# Patient Record
Sex: FEMALE | Race: WHITE | NOT HISPANIC OR LATINO | Employment: OTHER | URBAN - METROPOLITAN AREA
[De-identification: names, ages, dates, MRNs, and addresses within clinical notes are randomized per-mention and may not be internally consistent; named-entity substitution may affect disease eponyms.]

---

## 2017-01-23 ENCOUNTER — PATIENT OUTREACH (OUTPATIENT)
Dept: ADMINISTRATIVE | Facility: HOSPITAL | Age: 62
End: 2017-01-23

## 2017-01-31 ENCOUNTER — LAB VISIT (OUTPATIENT)
Dept: LAB | Facility: HOSPITAL | Age: 62
End: 2017-01-31
Attending: FAMILY MEDICINE
Payer: MEDICARE

## 2017-01-31 DIAGNOSIS — I10 ESSENTIAL HYPERTENSION: ICD-10-CM

## 2017-01-31 DIAGNOSIS — E03.4 HYPOTHYROIDISM DUE TO ACQUIRED ATROPHY OF THYROID: ICD-10-CM

## 2017-01-31 DIAGNOSIS — Z00.00 WELLNESS EXAMINATION: ICD-10-CM

## 2017-01-31 DIAGNOSIS — L30.9 DERMATITIS: ICD-10-CM

## 2017-01-31 DIAGNOSIS — R73.03 PREDIABETES: ICD-10-CM

## 2017-01-31 DIAGNOSIS — E88.819 INSULIN RESISTANCE: ICD-10-CM

## 2017-01-31 DIAGNOSIS — E66.01 MORBID OBESITY, UNSPECIFIED OBESITY TYPE: ICD-10-CM

## 2017-01-31 LAB
ALBUMIN SERPL BCP-MCNC: 3.4 G/DL
ALP SERPL-CCNC: 98 U/L
ALT SERPL W/O P-5'-P-CCNC: 21 U/L
ANION GAP SERPL CALC-SCNC: 6 MMOL/L
AST SERPL-CCNC: 24 U/L
BASOPHILS # BLD AUTO: 0.04 K/UL
BASOPHILS NFR BLD: 0.5 %
BILIRUB SERPL-MCNC: 0.4 MG/DL
BUN SERPL-MCNC: 12 MG/DL
CALCIUM SERPL-MCNC: 9.1 MG/DL
CHLORIDE SERPL-SCNC: 101 MMOL/L
CHOLEST/HDLC SERPL: 3.9 {RATIO}
CO2 SERPL-SCNC: 33 MMOL/L
CREAT SERPL-MCNC: 0.9 MG/DL
DIFFERENTIAL METHOD: NORMAL
EOSINOPHIL # BLD AUTO: 0.5 K/UL
EOSINOPHIL NFR BLD: 6 %
ERYTHROCYTE [DISTWIDTH] IN BLOOD BY AUTOMATED COUNT: 14.4 %
EST. GFR  (AFRICAN AMERICAN): >60 ML/MIN/1.73 M^2
EST. GFR  (NON AFRICAN AMERICAN): >60 ML/MIN/1.73 M^2
GLUCOSE SERPL-MCNC: 149 MG/DL
HCT VFR BLD AUTO: 44.2 %
HDL/CHOLESTEROL RATIO: 25.8 %
HDLC SERPL-MCNC: 159 MG/DL
HDLC SERPL-MCNC: 41 MG/DL
HGB BLD-MCNC: 14.2 G/DL
INSULIN COLLECTION INTERVAL: NORMAL
INSULIN SERPL-ACNC: 10.5 UU/ML
LDLC SERPL CALC-MCNC: 92.8 MG/DL
LYMPHOCYTES # BLD AUTO: 2.2 K/UL
LYMPHOCYTES NFR BLD: 29.1 %
MCH RBC QN AUTO: 30.5 PG
MCHC RBC AUTO-ENTMCNC: 32.1 %
MCV RBC AUTO: 95 FL
MONOCYTES # BLD AUTO: 0.5 K/UL
MONOCYTES NFR BLD: 6.9 %
NEUTROPHILS # BLD AUTO: 4.3 K/UL
NEUTROPHILS NFR BLD: 57.4 %
NONHDLC SERPL-MCNC: 118 MG/DL
PLATELET # BLD AUTO: 168 K/UL
PMV BLD AUTO: 12.3 FL
POTASSIUM SERPL-SCNC: 4.1 MMOL/L
PROT SERPL-MCNC: 7.2 G/DL
RBC # BLD AUTO: 4.65 M/UL
SODIUM SERPL-SCNC: 140 MMOL/L
T4 FREE SERPL-MCNC: 1.38 NG/DL
TRIGL SERPL-MCNC: 126 MG/DL
TSH SERPL DL<=0.005 MIU/L-ACNC: 0.26 UIU/ML
WBC # BLD AUTO: 7.56 K/UL

## 2017-01-31 PROCEDURE — 80053 COMPREHEN METABOLIC PANEL: CPT

## 2017-01-31 PROCEDURE — 84443 ASSAY THYROID STIM HORMONE: CPT

## 2017-01-31 PROCEDURE — 80061 LIPID PANEL: CPT

## 2017-01-31 PROCEDURE — 83525 ASSAY OF INSULIN: CPT

## 2017-01-31 PROCEDURE — 83036 HEMOGLOBIN GLYCOSYLATED A1C: CPT

## 2017-01-31 PROCEDURE — 85025 COMPLETE CBC W/AUTO DIFF WBC: CPT

## 2017-01-31 PROCEDURE — 84439 ASSAY OF FREE THYROXINE: CPT

## 2017-01-31 PROCEDURE — 36415 COLL VENOUS BLD VENIPUNCTURE: CPT | Mod: PO

## 2017-02-01 LAB
ESTIMATED AVG GLUCOSE: 148 MG/DL
HBA1C MFR BLD HPLC: 6.8 %

## 2017-02-02 ENCOUNTER — OFFICE VISIT (OUTPATIENT)
Dept: INTERNAL MEDICINE | Facility: CLINIC | Age: 62
End: 2017-02-02
Payer: MEDICARE

## 2017-02-02 VITALS
SYSTOLIC BLOOD PRESSURE: 128 MMHG | BODY MASS INDEX: 42.16 KG/M2 | WEIGHT: 246.94 LBS | HEIGHT: 64 IN | TEMPERATURE: 98 F | DIASTOLIC BLOOD PRESSURE: 84 MMHG | HEART RATE: 74 BPM

## 2017-02-02 DIAGNOSIS — E66.01 MORBID OBESITY WITH BODY MASS INDEX OF 40.0-49.9: Chronic | ICD-10-CM

## 2017-02-02 DIAGNOSIS — I10 ESSENTIAL HYPERTENSION: Chronic | ICD-10-CM

## 2017-02-02 DIAGNOSIS — E78.5 DYSLIPIDEMIA: ICD-10-CM

## 2017-02-02 DIAGNOSIS — E03.4 HYPOTHYROIDISM DUE TO ACQUIRED ATROPHY OF THYROID: Chronic | ICD-10-CM

## 2017-02-02 DIAGNOSIS — E11.9 TYPE II OR UNSPECIFIED TYPE DIABETES MELLITUS WITHOUT MENTION OF COMPLICATION, NOT STATED AS UNCONTROLLED: ICD-10-CM

## 2017-02-02 DIAGNOSIS — F33.42 RECURRENT MAJOR DEPRESSION IN FULL REMISSION: Chronic | ICD-10-CM

## 2017-02-02 DIAGNOSIS — K21.9 GASTROESOPHAGEAL REFLUX DISEASE, ESOPHAGITIS PRESENCE NOT SPECIFIED: ICD-10-CM

## 2017-02-02 DIAGNOSIS — E78.2 MIXED DIABETIC HYPERLIPIDEMIA ASSOCIATED WITH TYPE 2 DIABETES MELLITUS: Chronic | ICD-10-CM

## 2017-02-02 DIAGNOSIS — Z00.00 ROUTINE GENERAL MEDICAL EXAMINATION AT A HEALTH CARE FACILITY: Primary | ICD-10-CM

## 2017-02-02 DIAGNOSIS — E11.69 MIXED DIABETIC HYPERLIPIDEMIA ASSOCIATED WITH TYPE 2 DIABETES MELLITUS: Chronic | ICD-10-CM

## 2017-02-02 PROCEDURE — 82570 ASSAY OF URINE CREATININE: CPT

## 2017-02-02 PROCEDURE — 3079F DIAST BP 80-89 MM HG: CPT | Mod: S$GLB,,, | Performed by: FAMILY MEDICINE

## 2017-02-02 PROCEDURE — 99999 PR PBB SHADOW E&M-EST. PATIENT-LVL III: CPT | Mod: PBBFAC,,, | Performed by: FAMILY MEDICINE

## 2017-02-02 PROCEDURE — 3074F SYST BP LT 130 MM HG: CPT | Mod: S$GLB,,, | Performed by: FAMILY MEDICINE

## 2017-02-02 PROCEDURE — 99396 PREV VISIT EST AGE 40-64: CPT | Mod: S$GLB,,, | Performed by: FAMILY MEDICINE

## 2017-02-02 PROCEDURE — 99499 UNLISTED E&M SERVICE: CPT | Mod: S$GLB,,, | Performed by: FAMILY MEDICINE

## 2017-02-02 RX ORDER — TRIAMTERENE AND HYDROCHLOROTHIAZIDE 75; 50 MG/1; MG/1
TABLET ORAL
Qty: 90 TABLET | Refills: 3 | Status: SHIPPED | OUTPATIENT
Start: 2017-02-02 | End: 2018-01-23 | Stop reason: SDUPTHER

## 2017-02-02 RX ORDER — LANCETS 28 GAUGE
1 EACH MISCELLANEOUS
Qty: 300 EACH | Refills: 3 | Status: SHIPPED | OUTPATIENT
Start: 2017-02-02 | End: 2018-01-23 | Stop reason: SDUPTHER

## 2017-02-02 RX ORDER — INSULIN PUMP SYRINGE, 3 ML
EACH MISCELLANEOUS
Qty: 1 EACH | Refills: 0 | Status: SHIPPED | OUTPATIENT
Start: 2017-02-02 | End: 2017-05-05 | Stop reason: SDUPTHER

## 2017-02-02 RX ORDER — METFORMIN HYDROCHLORIDE 500 MG/1
TABLET, EXTENDED RELEASE ORAL
Qty: 120 TABLET | Refills: 2 | Status: SHIPPED | OUTPATIENT
Start: 2017-02-02 | End: 2017-02-02 | Stop reason: SDUPTHER

## 2017-02-02 RX ORDER — METFORMIN HYDROCHLORIDE 500 MG/1
TABLET, EXTENDED RELEASE ORAL
Qty: 360 TABLET | Refills: 3 | Status: SHIPPED | OUTPATIENT
Start: 2017-02-02 | End: 2018-01-23 | Stop reason: SDUPTHER

## 2017-02-02 RX ORDER — LEVOTHYROXINE SODIUM 175 UG/1
TABLET ORAL
Qty: 90 TABLET | Refills: 3 | Status: SHIPPED | OUTPATIENT
Start: 2017-02-02 | End: 2017-07-19 | Stop reason: SDUPTHER

## 2017-02-02 RX ORDER — ESOMEPRAZOLE MAGNESIUM 40 MG/1
40 CAPSULE, DELAYED RELEASE ORAL
Qty: 90 CAPSULE | Refills: 3 | Status: SHIPPED | OUTPATIENT
Start: 2017-02-02 | End: 2019-01-15 | Stop reason: SDUPTHER

## 2017-02-02 RX ORDER — TRAZODONE HYDROCHLORIDE 50 MG/1
50 TABLET ORAL NIGHTLY
Qty: 90 TABLET | Refills: 3 | Status: SHIPPED | OUTPATIENT
Start: 2017-02-02 | End: 2018-04-16 | Stop reason: SDUPTHER

## 2017-02-02 RX ORDER — LOVASTATIN 40 MG/1
40 TABLET ORAL NIGHTLY
Qty: 90 TABLET | Refills: 3 | Status: SHIPPED | OUTPATIENT
Start: 2017-02-02 | End: 2018-01-23 | Stop reason: SDUPTHER

## 2017-02-02 NOTE — PROGRESS NOTES
Subjective:      Patient ID: Anupama Norman is a 61 y.o. female.    Chief Complaint: Annual Exam    HPI Comments: Patient is coming in today for prevention exam.  She's been doing quite well.    She now has diabetes.  She's willing to start metformin now.  A1c is at 6.8.  She's been trying to exercise more.  She does not want see a dietitian.    She has hypothyroidism which is currently controlled 175 µg.  Labs are reviewed.    Hyperlipidemia currently on a statin therapy.  Slightly elevated as well.    Depressions currently controlled followed by Dr. Cifuentes at the mental health clinic in [place].  She did  and  her .    Hypertension is controlled with a diuretic.    She reports that she declines the flu shot.  She's not willing to do a pneumonia shot today.          Lab Results   Component Value Date    WBC 7.56 01/31/2017    HGB 14.2 01/31/2017    HCT 44.2 01/31/2017     01/31/2017    CHOL 159 01/31/2017    TRIG 126 01/31/2017    HDL 41 01/31/2017    ALT 21 01/31/2017    AST 24 01/31/2017     01/31/2017    K 4.1 01/31/2017     01/31/2017    CREATININE 0.9 01/31/2017    BUN 12 01/31/2017    CO2 33 (H) 01/31/2017    TSH 0.262 (L) 01/31/2017    HGBA1C 6.8 (H) 01/31/2017       Review of Systems   Constitutional: Negative for chills, fatigue and fever.   HENT: Negative for ear pain and trouble swallowing.    Eyes: Negative for pain and visual disturbance.   Respiratory: Negative for cough and shortness of breath.    Cardiovascular: Negative for chest pain and leg swelling.   Gastrointestinal: Negative for abdominal pain, blood in stool, nausea and vomiting.   Endocrine: Negative for cold intolerance and heat intolerance.   Genitourinary: Negative for dysuria and frequency.   Musculoskeletal: Negative for joint swelling, myalgias and neck pain.   Skin: Negative for color change and rash.   Neurological: Negative for dizziness and headaches.   Psychiatric/Behavioral:  Negative for behavioral problems and sleep disturbance.     Objective:     Physical Exam   Constitutional: She is oriented to person, place, and time. She appears well-developed and well-nourished.   Morbid obese wf   HENT:   Head: Normocephalic and atraumatic.   Right Ear: Tympanic membrane and external ear normal.   Left Ear: Tympanic membrane and external ear normal.   Nose: Nose normal.   Mouth/Throat: Oropharynx is clear and moist. No oropharyngeal exudate or posterior oropharyngeal edema.   Eyes: EOM are normal. Pupils are equal, round, and reactive to light.   Neck: Normal range of motion. Neck supple. No thyromegaly present.   Cardiovascular: Normal rate and regular rhythm.    Pulses:       Dorsalis pedis pulses are 2+ on the right side, and 2+ on the left side.   Pulmonary/Chest: Effort normal and breath sounds normal.   Abdominal: Soft. Bowel sounds are normal.   Musculoskeletal: Normal range of motion. She exhibits no edema.        Right foot: There is normal range of motion and no deformity.        Left foot: There is normal range of motion and no deformity.   Feet:   Right Foot:   Protective Sensation: 4 sites tested. 4 sites sensed.   Skin Integrity: Positive for warmth. Negative for ulcer, blister, skin breakdown or erythema.   Left Foot:   Protective Sensation: 4 sites tested. 4 sites sensed.   Skin Integrity: Positive for warmth. Negative for ulcer, blister, skin breakdown or erythema.   Neurological: She is alert and oriented to person, place, and time. She displays no atrophy. No sensory deficit. She exhibits normal muscle tone.   Skin: Skin is warm and dry. No erythema.   Psychiatric: She has a normal mood and affect. Her speech is normal and behavior is normal. Judgment and thought content normal. Cognition and memory are normal.   Vitals reviewed.    Assessment:     1. Routine general medical examination at a health care facility    2. Mixed diabetic hyperlipidemia associated with type 2  diabetes mellitus    3. Type II or unspecified type diabetes mellitus without mention of complication, not stated as uncontrolled    4. Hypothyroidism due to acquired atrophy of thyroid    5. Gastroesophageal reflux disease, esophagitis presence not specified    6. Essential hypertension    7. Morbid obesity with body mass index of 40.0-49.9    8. Recurrent major depression in full remission    9. Dyslipidemia      Plan:   Anupama was seen today for annual exam.    Diagnoses and all orders for this visit:    Routine general medical examination at a health care facility- - labs reviewed. Discussed Health Maintenance issues.     -     Microalbumin/creatinine urine ratio  -     TSH; Future  -     T4, free; Future  -     Comprehensive metabolic panel; Future  -     Hemoglobin A1c; Future    Mixed diabetic hyperlipidemia associated with type 2 diabetes mellitus- new, cont with statin  -     Microalbumin/creatinine urine ratio  -     TSH; Future  -     T4, free; Future  -     Comprehensive metabolic panel; Future  -     Hemoglobin A1c; Future    Type II or unspecified type diabetes mellitus without mention of complication, not stated as uncontrolled- now with a1c at 6.8. Willing to do metformin now.   -     Microalbumin/creatinine urine ratio  -     TSH; Future  -     T4, free; Future  -     Comprehensive metabolic panel; Future  -     Hemoglobin A1c; Future    Hypothyroidism due to acquired atrophy of thyroid- - stable, Continue with current medications and interventions. Labs reviewed.     -     Microalbumin/creatinine urine ratio  -     TSH; Future  -     T4, free; Future  -     Comprehensive metabolic panel; Future  -     Hemoglobin A1c; Future    Gastroesophageal reflux disease, esophagitis presence not specified - stable, Continue with current medications and interventions. Labs reviewed.   -     Microalbumin/creatinine urine ratio  -     TSH; Future  -     T4, free; Future  -     Comprehensive metabolic panel;  Future  -     Hemoglobin A1c; Future    Essential hypertension - stable, Continue with current medications and interventions. Labs reviewed.   -     Microalbumin/creatinine urine ratio  -     TSH; Future  -     T4, free; Future  -     Comprehensive metabolic panel; Future  -     Hemoglobin A1c; Future    Morbid obesity with body mass index of 40.0-49.9- needing weight loss. Declines visit to dietician.   -     Microalbumin/creatinine urine ratio  -     TSH; Future  -     T4, free; Future  -     Comprehensive metabolic panel; Future  -     Hemoglobin A1c; Future    Recurrent major depression in full remission- stable, seeing Dr Cifuentes  -     Microalbumin/creatinine urine ratio  -     TSH; Future  -     T4, free; Future  -     Comprehensive metabolic panel; Future  -     Hemoglobin A1c; Future    Dyslipidemia - stable, Continue with current medications and interventions. Labs reviewed.   -     lovastatin (MEVACOR) 40 MG tablet; Take 1 tablet (40 mg total) by mouth nightly.  -     Microalbumin/creatinine urine ratio  -     TSH; Future  -     T4, free; Future  -     Comprehensive metabolic panel; Future  -     Hemoglobin A1c; Future    Other orders  -     Discontinue: metformin (GLUCOPHAGE-XR) 500 MG 24 hr tablet; 1 tab po daily x 1wk, 2 tab po daily x 1wk, 3 tab po daily x 1wk, 4 tab po daily  -     esomeprazole (NEXIUM) 40 MG capsule; Take 1 capsule (40 mg total) by mouth before breakfast.  -     levothyroxine (SYNTHROID, LEVOTHROID) 175 MCG tablet; TAKE 1 TABLET  BEFORE BREAKFAST.  -     metformin (GLUCOPHAGE-XR) 500 MG 24 hr tablet; 4 tab po daily  -     trazodone (DESYREL) 50 MG tablet; Take 1 tablet (50 mg total) by mouth every evening.  -     triamterene-hydrochlorothiazide 75-50 mg (MAXZIDE) 75-50 mg per tablet; TAKE 1 TABLET ONE TIME DAILY  -     blood sugar diagnostic (TRUETEST TEST STRIPS) Strp; 1 strip by Misc.(Non-Drug; Combo Route) route 4 (four) times daily with meals and nightly. [employer] True Metrix Test  Strips  -     lancets (TRUEPLUS LANCETS) 28 gauge Misc; 1 lancet by Misc.(Non-Drug; Combo Route) route 4 (four) times daily with meals and nightly. [employer] brand  -     blood-glucose meter (FREESTYLE SYSTEM KIT) kit; [employer] TRUE METRIX AIR METER Use as instructed for TWICE DAILY testing            Return in about 6 months (around 8/2/2017) for dm, chol, bp.

## 2017-02-03 LAB
CREAT UR-MCNC: 99 MG/DL
MICROALBUMIN UR DL<=1MG/L-MCNC: 5 UG/ML
MICROALBUMIN/CREATININE RATIO: 5.1 UG/MG

## 2017-02-13 ENCOUNTER — TELEPHONE (OUTPATIENT)
Dept: INTERNAL MEDICINE | Facility: CLINIC | Age: 62
End: 2017-02-13

## 2017-02-13 ENCOUNTER — CLINICAL SUPPORT (OUTPATIENT)
Dept: INTERNAL MEDICINE | Facility: CLINIC | Age: 62
End: 2017-02-13
Payer: MEDICARE

## 2017-02-13 ENCOUNTER — CLINICAL SUPPORT (OUTPATIENT)
Dept: CARDIOLOGY | Facility: CLINIC | Age: 62
End: 2017-02-13
Payer: MEDICARE

## 2017-02-13 DIAGNOSIS — Z01.818 PRE-OP EXAM: ICD-10-CM

## 2017-02-13 DIAGNOSIS — Z01.818 PREOP EXAM FOR INTERNAL MEDICINE: Primary | ICD-10-CM

## 2017-02-13 DIAGNOSIS — Z01.818 PRE-OP EXAM: Primary | ICD-10-CM

## 2017-02-13 LAB
B-HCG UR QL: NEGATIVE
CTP QC/QA: YES

## 2017-02-13 PROCEDURE — 93010 ELECTROCARDIOGRAM REPORT: CPT | Mod: S$GLB,,, | Performed by: NUCLEAR MEDICINE

## 2017-02-13 PROCEDURE — 81025 URINE PREGNANCY TEST: CPT | Mod: S$GLB,,, | Performed by: FAMILY MEDICINE

## 2017-02-13 PROCEDURE — 93005 ELECTROCARDIOGRAM TRACING: CPT | Mod: S$GLB,,, | Performed by: FAMILY MEDICINE

## 2017-02-13 NOTE — TELEPHONE ENCOUNTER
Called and spoke with Dr. Hollis's office. Explained to them that we just received paperwork this morning. That pt did tell us that she would possibly be having eye surgery but she would get back with us about when it would be scheduled and any paperwork that was needed. On paperwork they are requesting a EKG and blood work. Last EKG was done in 2014 that we have on file. Explained to Dahlia that we would have to get pt in to have EKG done and then Dr. Krueger needs to review and then can fill out paperwork. She stated that the patients surgery is scheduled for in the morning. I again explained to her that we just received paperwork this morning. She stated that she was calling to reschedule the patient as they need paperwork now.

## 2017-02-13 NOTE — TELEPHONE ENCOUNTER
----- Message from Shamar Jacob sent at 2/13/2017  1:06 PM CST -----  Contact: Dahlia from Dr Hollis's office  States she is calling rg a surgical clearance for in the morning and faxed to [phone] and can be reached at [phone]//[email or web address]

## 2017-02-13 NOTE — TELEPHONE ENCOUNTER
----- Message from Niurak Peters sent at 2/13/2017  1:56 PM CST -----  Contact: pt  Pt is scheduled for cataract surgery tomorrow. Pt needs an EKG.  Can pt be scheduled for one today? [phone]

## 2017-02-13 NOTE — TELEPHONE ENCOUNTER
Pt will need u preg and ekg for preop for catract surgery based on her eye MD's request. Will put in orders and complete form based on last visit.

## 2017-05-05 RX ORDER — BLOOD-GLUCOSE METER
EACH MISCELLANEOUS
Qty: 1 EACH | Refills: 0 | Status: SHIPPED | OUTPATIENT
Start: 2017-05-05 | End: 2017-07-11 | Stop reason: SDUPTHER

## 2017-05-18 ENCOUNTER — PATIENT MESSAGE (OUTPATIENT)
Dept: INTERNAL MEDICINE | Facility: CLINIC | Age: 62
End: 2017-05-18

## 2017-05-19 ENCOUNTER — OFFICE VISIT (OUTPATIENT)
Dept: URGENT CARE | Facility: CLINIC | Age: 62
End: 2017-05-19
Payer: MEDICARE

## 2017-05-19 VITALS
BODY MASS INDEX: 40.31 KG/M2 | OXYGEN SATURATION: 96 % | HEIGHT: 64 IN | DIASTOLIC BLOOD PRESSURE: 78 MMHG | WEIGHT: 236.13 LBS | HEART RATE: 83 BPM | SYSTOLIC BLOOD PRESSURE: 136 MMHG | TEMPERATURE: 98 F

## 2017-05-19 DIAGNOSIS — N39.0 URINARY TRACT INFECTION WITHOUT HEMATURIA, SITE UNSPECIFIED: ICD-10-CM

## 2017-05-19 DIAGNOSIS — R30.0 DYSURIA: Primary | ICD-10-CM

## 2017-05-19 PROCEDURE — 3075F SYST BP GE 130 - 139MM HG: CPT | Mod: S$GLB,,, | Performed by: NURSE PRACTITIONER

## 2017-05-19 PROCEDURE — 99999 PR PBB SHADOW E&M-EST. PATIENT-LVL IV: CPT | Mod: PBBFAC,,, | Performed by: NURSE PRACTITIONER

## 2017-05-19 PROCEDURE — 87086 URINE CULTURE/COLONY COUNT: CPT

## 2017-05-19 PROCEDURE — 1160F RVW MEDS BY RX/DR IN RCRD: CPT | Mod: S$GLB,,, | Performed by: NURSE PRACTITIONER

## 2017-05-19 PROCEDURE — 99213 OFFICE O/P EST LOW 20 MIN: CPT | Mod: S$GLB,,, | Performed by: NURSE PRACTITIONER

## 2017-05-19 PROCEDURE — 3078F DIAST BP <80 MM HG: CPT | Mod: S$GLB,,, | Performed by: NURSE PRACTITIONER

## 2017-05-19 RX ORDER — CIPROFLOXACIN 250 MG/1
250 TABLET, FILM COATED ORAL 2 TIMES DAILY
Qty: 6 TABLET | Refills: 0 | Status: SHIPPED | OUTPATIENT
Start: 2017-05-19 | End: 2017-05-22

## 2017-05-19 NOTE — MR AVS SNAPSHOT
[facility]  [address] [place] [address]  Phone: [phone]  Fax: [phone]                  Anupama Norman   2017 1:00 PM   Office Visit    Description:  Female : 1955   Provider:  FUNMILAYO Gomez   Department:  [facility]           Reason for Visit     Dysuria     Headache           Diagnoses this Visit        Comments    Dysuria    -  Primary     Urinary tract infection without hematuria, site unspecified                To Do List           Future Appointments        Provider Department Dept Phone    2017 8:00 AM LABORATORY, [place] [facility] [phone]    8/3/2017 1:00 PM [phone]      Goals (5 Years of Data)     None       These Medications        Disp Refills Start End    ciprofloxacin HCl (CIPRO) 250 MG tablet 6 tablet 0 2017    Take 1 tablet (250 mg total) by mouth 2 (two) times daily. - Oral    Pharmacy: [facility] [place] [address]  [address] Ph #: [phone]         [facility]     [facility] -  Assistance  Unless otherwise directed by your provider, please contact [facility], our nurse care line that is available for  assistance.     Registered nurses in the [facility] provide: appointment scheduling, clinical advisement, health education, and other advisory services.  Call: [phone] (toll free)               Medications           Message regarding Medications     Verify the changes and/or additions to your medication regime listed below are the same as discussed with your clinician today.  If any of these changes or additions are incorrect, please notify your healthcare provider.        START taking these NEW medications        Refills    ciprofloxacin HCl (CIPRO) 250 MG tablet 0    Sig: Take 1 tablet (250 mg total) by mouth 2 (two) times  "daily.    Class: Normal    Route: Oral           Verify that the below list of medications is an accurate representation of the medications you are currently taking.  If none reported, the list may be blank. If incorrect, please contact your healthcare provider. Carry this list with you in case of emergency.           Current Medications     aspirin (ECOTRIN) 81 MG EC tablet Take 1 tablet by mouth Daily.    blood sugar diagnostic (TRUETEST TEST STRIPS) Strp 1 strip by Misc.(Non-Drug; Combo Route) route 4 (four) times daily with meals and nightly. [employer] True Metrix Test Strips    esomeprazole (NEXIUM) 40 MG capsule Take 1 capsule (40 mg total) by mouth before breakfast.    lancets (TRUEPLUS LANCETS) 28 gauge Misc 1 lancet by Misc.(Non-Drug; Combo Route) route 4 (four) times daily with meals and nightly. [employer] brand    levothyroxine (SYNTHROID, LEVOTHROID) 175 MCG tablet TAKE 1 TABLET  BEFORE BREAKFAST.    lovastatin (MEVACOR) 40 MG tablet Take 1 tablet (40 mg total) by mouth nightly.    metformin (GLUCOPHAGE-XR) 500 MG 24 hr tablet 4 tab po daily    paroxetine (PAXIL) 20 MG tablet Take 20 mg by mouth every morning.    paroxetine (PAXIL) 40 MG tablet Take 40 mg by mouth every evening.     trazodone (DESYREL) 50 MG tablet Take 1 tablet (50 mg total) by mouth every evening.    triamterene-hydrochlorothiazide 75-50 mg (MAXZIDE) 75-50 mg per tablet TAKE 1 TABLET ONE TIME DAILY    TRUE METRIX AIR GLUCOSE METER kit USE AS DIRECTED    ciprofloxacin HCl (CIPRO) 250 MG tablet Take 1 tablet (250 mg total) by mouth 2 (two) times daily.    triamcinolone acetonide 0.1% (KENALOG) 0.1 % cream Apply topically 2 (two) times daily.           Clinical Reference Information           Your Vitals Were     BP Pulse Temp Height    136/78 (BP Location: Right arm, Patient Position: Sitting, BP Method: Manual) 83 98 °F (36.7 °C) (Tympanic) 5' 4" (1.626 m)    Weight SpO2 BMI    107.1 kg (236 lb 1.8 oz) 96% 40.53 kg/m2      Blood Pressure      " "    Most Recent Value    BP  136/78      Allergies as of 5/19/2017     Betamethasone Sodium Phosphate    Codeine    Loratadine    Prednisone    Tomato (Solanum Lycopersicum)      Immunizations Administered on Date of Encounter - 5/19/2017     None      Orders Placed During Today's Visit      Normal Orders This Visit    POCT urine dipstick without microscope       Instructions      Bladder Infection, Female (Adult)    Urine is normally doesn't have any bacteria in it. But bacteria can get into the urinary tract from the skin around the rectum. Or they can travel in the blood from elsewhere in the body. Once they are in your urinary tract, they can cause infection in the urethra (urethritis), the bladder (cystitis), or the kidneys (pyelonephritis).  The most common place for an infection is in the bladder. This is called a bladder infection. This is one of the most common infections in women. Most bladder infections are easily treated. They are not serious unless the infection spreads to the kidney.  The phrases "bladder infection," "UTI," and "cystitis" are often used to describe the same thing. But they are not always the same. Cystitis is an inflammation of the bladder. The most common cause of cystitis is an infection.  Symptoms  The infection causes inflammation in the urethra and bladder. This causes many of the symptoms. The most common symptoms of a bladder infection are:  · Pain or burning when urinating  · Having to urinate more often than usual  · Urgent need to urinate  · Only a small amount of urine comes out  · Blood in urine  · Abdominal discomfort. This is usually in the lower abdomen above the pubic bone.  · Cloudy urine  · Strong- or bad-smelling urine  · Unable to urinate (urinary retention)  · Unable to hold urine in (urinary incontinence)  · Fever  · Loss of appetite  · Confusion (in older adults)  Causes  Bladder infections are not contagious. You can't get one from someone else, from a toilet " seat, or from sharing a bath.  The most common cause of bladder infections is bacteria from the bowels. The bacteria get onto the skin around the opening of the urethra. From there, they can get into the urine and travel up to the bladder, causing inflammation and infection. This usually happens because of:  · Wiping improperly after urinating. Always wipe from front to back.  · Bowel incontinence  · Pregnancy  · Procedures such as having a catheter inserted  · Older age  · Not emptying your bladder. This can allow bacteria a chance to grow in your urine.  · Dehydration  · Constipation  · Sex  · Use of a diaphragm for birth control   Treatment  Bladder infections are diagnosed by a urine test. They are treated with antibiotics and usually clear up quickly without complications. Treatment helps prevent a more serious kidney infection.  Medicines  Medicines can help in the treatment of a bladder infection:  · Take antibiotics until they are used up, even if you feel better. It is important to finish them to make sure the infection has cleared.  · You can use acetaminophen or ibuprofen for pain, fever, or discomfort, unless another medicine was prescribed. If you have chronic liver or kidney disease, talk with your healthcare provider before using these medicines. Also talk with your provider if you've ever had a stomach ulcer or gastrointestinal bleeding, or are taking blood-thinner medicines.  · If you are given phenazopydridine to reduce burning with urination, it will cause your urine to become a bright orange color. This can stain clothing.  Care and prevention  These self-care steps can help prevent future infections:  · Drink plenty of fluids to prevent dehydration and flush out your bladder. Do this unless you must restrict fluids for other health reasons, or your doctor told you not to.  · Proper cleaning after going to the bathroom is important. Wipe from front to back after using the toilet to prevent the  spread of bacteria.  · Urinate more often. Don't try to hold urine in for a long time.  · Wear loose-fitting clothes and cotton underwear. Avoid tight-fitting pants.  · Improve your diet and prevent constipation. Eat more fresh fruit and vegetables, and fiber, and less junk and fatty foods.  · Avoid sex until your symptoms are gone.  · Avoid caffeine, alcohol, and spicy foods. These can irritate your bladder.  · Urinate right after intercourse to flush out your bladder.  · If you use birth control pills and have frequent bladder infections, discuss it with your doctor.  Follow-up care  Call your healthcare provider if all symptoms are not gone after 3 days of treatment. This is especially important if you have repeat infections.  If a culture was done, you will be told if your treatment needs to be changed. If directed, you can call to find out the results.  If X-rays were done, you will be told if the results will affect your treatment.  Call 911  Call 911 if any of the following occur:  · Trouble breathing  · Hard to wake up or confusion  · Fainting or loss of consciousness  · Rapid heart rate  When to seek medical advice  Call your healthcare provider right away if any of these occur:  · Fever of 100.4ºF (38.0ºC) or higher, or as directed by your healthcare provider  · Symptoms are not better by the third day of treatment  · Back or belly (abdominal) pain that gets worse  · Repeated vomiting, or unable to keep medicine down  · Weakness or dizziness  · Vaginal discharge  · Pain, redness, or swelling in the outer vaginal area (labia)  Date Last Reviewed: 10/1/2016  © 0045-5038 [employer]. [address] [place] [address], [place] [address]. All rights reserved. This information is not intended as a substitute for professional medical care. Always follow your healthcare professional's instructions.             Language Assistance Services     ATTENTION: Language assistance services are available, free of  charge. Please call [phone].      ATENCIÓN: Si habla español, tiene a waller disposición servicios gratuitos de asistencia lingüística. Llame al [phone].     CHÚ Ý: N?u b?n nói Ti?ng Vi?t, có các d?ch v? h? tr? ngôn ng? mi?n phí dành cho b?n. G?i s? [phone].         [facility] complies with applicable Federal civil rights laws and does not discriminate on the basis of race, color, national origin, age, disability, or sex.

## 2017-05-19 NOTE — PROGRESS NOTES
Subjective:       Patient ID: Anupama Dallasmonsam is a 62 y.o. female.    Chief Complaint: Dysuria and Headache    HPI   Anupama presents to urgent care with complaints of dysuria x 2 days.  Review of Systems   Constitutional: Negative for chills and fever.   Respiratory: Negative for shortness of breath.    Gastrointestinal: Negative for nausea and vomiting.   Genitourinary: Positive for dysuria, frequency and urgency. Negative for decreased urine volume, difficulty urinating and hematuria.   Neurological: Negative for headaches.   Psychiatric/Behavioral: Negative for behavioral problems and confusion.       Objective:      Physical Exam   Constitutional: She is oriented to person, place, and time. She appears well-developed and well-nourished.   Eyes: Conjunctivae are normal.   Neck: Normal range of motion.   Cardiovascular: Normal rate.    Pulmonary/Chest: Effort normal. No respiratory distress.   Abdominal: Soft. Bowel sounds are normal. She exhibits no distension. There is no tenderness.   Neurological: She is alert and oriented to person, place, and time.   Skin: Skin is warm and dry.   Psychiatric: She has a normal mood and affect. Her behavior is normal.   Vitals reviewed.      Assessment:       1. Dysuria    2. Urinary tract infection without hematuria, site unspecified        Plan:   Anupama was seen today for dysuria and headache.    Diagnoses and all orders for this visit:    Dysuria  -     POCT urine dipstick without microscope  -     ciprofloxacin HCl (CIPRO) 250 MG tablet; Take 1 tablet (250 mg total) by mouth 2 (two) times daily.  -     Urine culture    Urinary tract infection without hematuria, site unspecified  -     ciprofloxacin HCl (CIPRO) 250 MG tablet; Take 1 tablet (250 mg total) by mouth 2 (two) times daily.  -     Urine culture      - Increase fluid intake.  Drink at least 64 ounces of water each day.  - Do not hold urine in the bladder.  Empty bladder with first urge to urinate.  - Avoid  wiping from back to front.  - Empty bladder before and after intercourse  -- Complete all antibiotics to help prevent recurrence of infection and resistant bacteria.  -- Follow up with PCP if no improvement in 3-5 days.   - To ER for worsening symptoms or persistent fever

## 2017-05-19 NOTE — PATIENT INSTRUCTIONS

## 2017-05-21 LAB — BACTERIA UR CULT: NORMAL

## 2017-06-19 ENCOUNTER — OFFICE VISIT (OUTPATIENT)
Dept: OBSTETRICS AND GYNECOLOGY | Facility: CLINIC | Age: 62
End: 2017-06-19
Payer: MEDICARE

## 2017-06-19 VITALS
WEIGHT: 240.94 LBS | SYSTOLIC BLOOD PRESSURE: 148 MMHG | DIASTOLIC BLOOD PRESSURE: 98 MMHG | HEIGHT: 64 IN | BODY MASS INDEX: 41.13 KG/M2

## 2017-06-19 DIAGNOSIS — E78.2 MIXED DIABETIC HYPERLIPIDEMIA ASSOCIATED WITH TYPE 2 DIABETES MELLITUS: Chronic | ICD-10-CM

## 2017-06-19 DIAGNOSIS — E11.69 MIXED DIABETIC HYPERLIPIDEMIA ASSOCIATED WITH TYPE 2 DIABETES MELLITUS: Chronic | ICD-10-CM

## 2017-06-19 DIAGNOSIS — Z01.818 PREOP TESTING: ICD-10-CM

## 2017-06-19 DIAGNOSIS — N81.6 RECTOCELE: ICD-10-CM

## 2017-06-19 DIAGNOSIS — I10 ESSENTIAL HYPERTENSION: Chronic | ICD-10-CM

## 2017-06-19 DIAGNOSIS — N81.9 FEMALE GENITAL PROLAPSE, UNSPECIFIED TYPE: ICD-10-CM

## 2017-06-19 DIAGNOSIS — R14.0 ABDOMINAL BLOATING: Primary | ICD-10-CM

## 2017-06-19 DIAGNOSIS — E66.01 MORBID OBESITY WITH BODY MASS INDEX OF 40.0-49.9: Chronic | ICD-10-CM

## 2017-06-19 PROCEDURE — 3044F HG A1C LEVEL LT 7.0%: CPT | Mod: S$GLB,,, | Performed by: OBSTETRICS & GYNECOLOGY

## 2017-06-19 PROCEDURE — 99999 PR PBB SHADOW E&M-EST. PATIENT-LVL IV: CPT | Mod: PBBFAC,,, | Performed by: OBSTETRICS & GYNECOLOGY

## 2017-06-19 PROCEDURE — 99213 OFFICE O/P EST LOW 20 MIN: CPT | Mod: S$GLB,,, | Performed by: OBSTETRICS & GYNECOLOGY

## 2017-06-19 NOTE — PROGRESS NOTES
"+ large rectocele in midline just cephalad to the introitus about 1-2cm      CHIEF COMPLAINT:   No chief complaint on file.      HISTORY OF PRESENT ILLNESS    Anupama Norman 62 y.o.  complains of: feeling something 'give' in the vaginal area, and now feels like there is tisue sticking out, franci when she sits.   She always felt that after her last  the doc did not 'sew me up correctly', but did not have this falling feeling at the time.   She has very rare ur incontinence, no stool or flatus incontinence.   Not sexually active. occ consti[pation but o/w no BM problems. Does not have to splint. No VB.    HISTORY  Patient Active Problem List   Diagnosis    Recurrent major depression in full remission    Essential hypertension    GERD (gastroesophageal reflux disease)    Mixed diabetic hyperlipidemia associated with type 2 diabetes mellitus    Hypothyroidism due to acquired atrophy of thyroid    Morbid obesity with body mass index of 40.0-49.9    Colon polyp       Past Medical History:   Diagnosis Date    GERD (gastroesophageal reflux disease)     HLD (hyperlipidemia)     HTN (hypertension)     Hypothyroid     Insulin resistance     Obesity     Staph infection        Past Surgical History:   Procedure Laterality Date    APPENDECTOMY  1982    BARTHOLIN GLAND CYST EXCISION Left 2010    Dr ABDOULAYE Fang    BREAST SURGERY Right     benign lump removed    CATARACT EXTRACTION      CHOLECYSTECTOMY  2014    lap choly for biliary dyskinesia    ORIF WRIST FRACTURE  2009    comminuted intraarticular fx of distal Lt radius w/assoc avulsion fx styloid process of ulna - "metalplate       Family History   Problem Relation Age of Onset    Diabetes         Social History     Social History    Marital status: Single     Spouse name: N/A    Number of children: 2    Years of education: N/A     Occupational History    retired      Social History Main Topics    Smoking status: Never " Smoker    Smokeless tobacco: Never Used    Alcohol use Yes      Comment: rarely // once a year    Drug use: No    Sexual activity: Not Currently     Partners: Male     Birth control/ protection: None     Other Topics Concern    None     Social History Narrative    None       Current Outpatient Prescriptions   Medication Sig Dispense Refill    aspirin (ECOTRIN) 81 MG EC tablet Take 1 tablet by mouth Daily.      blood sugar diagnostic (TRUETEST TEST STRIPS) Strp 1 strip by Misc.(Non-Drug; Combo Route) route 4 (four) times daily with meals and nightly. [employer] True Metrix Test Strips 300 strip 3    esomeprazole (NEXIUM) 40 MG capsule Take 1 capsule (40 mg total) by mouth before breakfast. 90 capsule 3    lancets (TRUEPLUS LANCETS) 28 gauge Misc 1 lancet by Misc.(Non-Drug; Combo Route) route 4 (four) times daily with meals and nightly. [employer] brand 300 each 3    levothyroxine (SYNTHROID, LEVOTHROID) 175 MCG tablet TAKE 1 TABLET  BEFORE BREAKFAST. 90 tablet 3    lovastatin (MEVACOR) 40 MG tablet Take 1 tablet (40 mg total) by mouth nightly. 90 tablet 3    metformin (GLUCOPHAGE-XR) 500 MG 24 hr tablet 4 tab po daily 360 tablet 3    paroxetine (PAXIL) 20 MG tablet Take 20 mg by mouth every morning.      paroxetine (PAXIL) 40 MG tablet Take 40 mg by mouth every evening.       trazodone (DESYREL) 50 MG tablet Take 1 tablet (50 mg total) by mouth every evening. 90 tablet 3    triamterene-hydrochlorothiazide 75-50 mg (MAXZIDE) 75-50 mg per tablet TAKE 1 TABLET ONE TIME DAILY 90 tablet 3    TRUE METRIX AIR GLUCOSE METER kit USE AS DIRECTED 1 each 0    triamcinolone acetonide 0.1% (KENALOG) 0.1 % cream Apply topically 2 (two) times daily. 80 g 3     No current facility-administered medications for this visit.        Review of patient's allergies indicates:   Allergen Reactions    Betamethasone sodium phosphate      Sweat and depression    Codeine      Other reaction(s): Shortness of breath    Loratadine       Other reaction(s): Felt distant    Prednisone      Other reaction(s): sweat and depression    Tomato (solanum lycopersicum) Hives and Rash           PHYSICAL EXAM     Vitals:    06/19/17 1517   BP: (!) 148/98       PAIN SCALE: 0/10 None    ROS:  GENERAL: No fever, chills, fatigability or weight loss.  ABDOMEN: Appetite fine. No weight loss. Denies diarrhea, abdominal pain, hematemesis or blood in stool.  URINARY: No flank pain, dysuria or hematuria.  REPRODUCTIVE: No abnormal vaginal bleeding.  BREASTS: Breasts symmetric, nontender and no lumps detected.    PE:   APPEARANCE: Well nourished, well developed, in no acute distress.  ABDOMEN: Soft. No tenderness or masses. obese abd.   PELVIC:   VULVA: No lesions. Normal female genitalia.  URETHRAL MEATUS: Normal size and location, no lesions, no prolapse.  URETHRA: No masses, tenderness, prolapse or scarring.  VAGINA: Moist and well rugated, no discharge, midline  Cystocele to introitus, there is a significant midline rectocele: a midline bulge just cephalad to the introitus - on vaginorectal exam, a rectovaginal defect is palpable just above the rectal sphincter.  CERVIX: No lesions, normal diameter, no stenosis, no cervical motion tenderness.  UTERUS:8 week size, regular shape, mobile, non-tender, normal position, on valsalva descensus is to -1  ADNEXA: No masses, tenderness or CDS nodularity.  ANUS PERINEUM: No lesions, no relaxation, no external hemorrhoids.      DIAGNOSIS:   1. genitalp prolapse  2. Rectocele  3. Cystocele.      PLAN:   Reviewed rx options: obs, kegels, PT, surgery, pessary.  Pt interested in surgery.  R/a/b reviewed.  COUNSELING  Discussed with patient the risks of surgery, including but not limited to, risks of anesthesia, infection, bleeding, injury to other organs, such as skin, nerves, arteries, veins, bowel, bladder, ureters, with possible need for reparative or subsequent surgery such as bowel resection/repair - or due to undetected  cancer, hernia, colostomy, bladder/ureter surgery, blood transfusion, possible necessity to perform a  Hysterectomy to improved prolpase results .There is also risks of development of deep venous thrombosis, pulmonary embolus, myocardial infarction, possible death.  therew is a risk of dysparunia, BM pain or problems, ur incontinence. The patient verbalizes understanding. Discussed with the Patient the risks/benefits/alternatives of the treatment options.           COUNSELING:  Patient was counseled today on A.C.S. Pap guidelines and recommendations for yearly pelvic exams, mammograms and monthly self breast exams; to see her PCP for other health maintenance.     FOLLOW-UP: With armida larson

## 2017-06-23 ENCOUNTER — TELEPHONE (OUTPATIENT)
Dept: RADIOLOGY | Facility: HOSPITAL | Age: 62
End: 2017-06-23

## 2017-06-24 PROBLEM — N81.6 RECTOCELE: Status: ACTIVE | Noted: 2017-06-24

## 2017-06-24 PROBLEM — N81.9 FEMALE GENITAL PROLAPSE: Status: ACTIVE | Noted: 2017-06-24

## 2017-06-26 ENCOUNTER — HOSPITAL ENCOUNTER (OUTPATIENT)
Dept: RADIOLOGY | Facility: HOSPITAL | Age: 62
Discharge: HOME | End: 2017-06-26
Attending: OBSTETRICS & GYNECOLOGY
Payer: MEDICARE

## 2017-06-26 DIAGNOSIS — R14.0 ABDOMINAL BLOATING: ICD-10-CM

## 2017-06-26 PROCEDURE — 76856 US EXAM PELVIC COMPLETE: CPT | Mod: TC,PO

## 2017-06-26 PROCEDURE — 76856 US EXAM PELVIC COMPLETE: CPT | Mod: 26,,, | Performed by: RADIOLOGY

## 2017-06-26 PROCEDURE — 76830 TRANSVAGINAL US NON-OB: CPT | Mod: 26,,, | Performed by: RADIOLOGY

## 2017-06-27 ENCOUNTER — OFFICE VISIT (OUTPATIENT)
Dept: CARDIOLOGY | Facility: CLINIC | Age: 62
End: 2017-06-27
Payer: MEDICARE

## 2017-06-27 ENCOUNTER — CLINICAL SUPPORT (OUTPATIENT)
Dept: CARDIOLOGY | Facility: CLINIC | Age: 62
End: 2017-06-27
Payer: MEDICARE

## 2017-06-27 VITALS
WEIGHT: 233 LBS | SYSTOLIC BLOOD PRESSURE: 122 MMHG | HEART RATE: 65 BPM | DIASTOLIC BLOOD PRESSURE: 80 MMHG | HEIGHT: 64 IN | BODY MASS INDEX: 39.78 KG/M2

## 2017-06-27 DIAGNOSIS — I10 ESSENTIAL HYPERTENSION: Chronic | ICD-10-CM

## 2017-06-27 DIAGNOSIS — Z01.810 PREOP CARDIOVASCULAR EXAM: Primary | ICD-10-CM

## 2017-06-27 DIAGNOSIS — Z01.810 PREOP CARDIOVASCULAR EXAM: ICD-10-CM

## 2017-06-27 DIAGNOSIS — E11.69 MIXED DIABETIC HYPERLIPIDEMIA ASSOCIATED WITH TYPE 2 DIABETES MELLITUS: Chronic | ICD-10-CM

## 2017-06-27 DIAGNOSIS — E78.2 MIXED DIABETIC HYPERLIPIDEMIA ASSOCIATED WITH TYPE 2 DIABETES MELLITUS: Chronic | ICD-10-CM

## 2017-06-27 PROCEDURE — 93000 ELECTROCARDIOGRAM COMPLETE: CPT | Mod: S$GLB,,, | Performed by: NUCLEAR MEDICINE

## 2017-06-27 PROCEDURE — 99999 PR PBB SHADOW E&M-EST. PATIENT-LVL III: CPT | Mod: PBBFAC,,, | Performed by: INTERNAL MEDICINE

## 2017-06-27 PROCEDURE — 99499 UNLISTED E&M SERVICE: CPT | Mod: S$GLB,,, | Performed by: INTERNAL MEDICINE

## 2017-06-27 PROCEDURE — 3044F HG A1C LEVEL LT 7.0%: CPT | Mod: S$GLB,,, | Performed by: INTERNAL MEDICINE

## 2017-06-27 PROCEDURE — 99203 OFFICE O/P NEW LOW 30 MIN: CPT | Mod: S$GLB,,, | Performed by: INTERNAL MEDICINE

## 2017-06-27 NOTE — LETTER
June 27, 2017      Amelia Fang MD  [address]  [place] [address]           [facility] - Cardiology  [address] [place] [address]  Phone: [phone]  Fax: [phone]          Patient: Anupama Norman   MR Number: [id number]   YOB: 1955   Date of Visit: 6/27/2017       Dear Dr. Amelia Fang:    Thank you for referring Anupama Norman to me for evaluation. Attached you will find relevant portions of my assessment and plan of care.    If you have questions, please do not hesitate to call me. I look forward to following Anupama Norman along with you.    Sincerely,    Edwardo Rowley MD    Enclosure  CC:  No Recipients    If you would like to receive this communication electronically, please contact [email or web address] or [phone] to request more information on [employer] Link access.    For providers and/or their staff who would like to refer a patient to [facility], please contact us through our one-stop-shop provider referral line, [facility], at [phone].    If you feel you have received this communication in error or would no longer like to receive these types of communications, please e-mail [email or web address]

## 2017-06-27 NOTE — PROGRESS NOTES
"Subjective:   Patient ID:  Anupama Norman is a 62 y.o. female who presents for evaluation of Pre-op Exam (hysterectomy)      61 yo female, came in for preop clearance of hysterectomy.  PMH NIDDM, HTN, HLD, GERD and obesity.  No chest pain, DIAZ, dizziness, palpitation, and fainting.  No smoking/drinking.  EKG NSR         Past Medical History:   Diagnosis Date    GERD (gastroesophageal reflux disease)     HLD (hyperlipidemia)     HTN (hypertension)     Hypothyroid     Insulin resistance     Obesity     Staph infection        Past Surgical History:   Procedure Laterality Date    APPENDECTOMY  1982    BARTHOLIN GLAND CYST EXCISION Left 05/12/2010    Dr ABDOULAYE Fang    BREAST SURGERY Right 2003    benign lump removed    CATARACT EXTRACTION      CHOLECYSTECTOMY  11/12/2014    lap choly for biliary dyskinesia    ORIF WRIST FRACTURE  2009    comminuted intraarticular fx of distal Lt radius w/assoc avulsion fx styloid process of ulna - "metalplate       Social History   Substance Use Topics    Smoking status: Never Smoker    Smokeless tobacco: Never Used    Alcohol use Yes      Comment: rarely // once a year       Family History   Problem Relation Age of Onset    Diabetes         Review of Systems   Constitution: Negative for decreased appetite, diaphoresis, fever, weakness, malaise/fatigue and night sweats.   HENT: Negative for headaches and nosebleeds.    Eyes: Negative for blurred vision and double vision.   Cardiovascular: Negative for chest pain, claudication, dyspnea on exertion, irregular heartbeat, leg swelling, near-syncope, orthopnea, palpitations, paroxysmal nocturnal dyspnea and syncope.   Respiratory: Negative for cough, shortness of breath, sleep disturbances due to breathing, snoring, sputum production and wheezing.    Endocrine: Negative for cold intolerance and polyuria.   Hematologic/Lymphatic: Does not bruise/bleed easily.   Skin: Negative for rash.   Musculoskeletal: Negative for back " pain, falls, joint pain, joint swelling and neck pain.   Gastrointestinal: Negative for abdominal pain, heartburn, nausea and vomiting.   Genitourinary: Negative for dysuria, frequency and hematuria.   Neurological: Negative for difficulty with concentration, dizziness, focal weakness, light-headedness, numbness and seizures.   Psychiatric/Behavioral: Negative for depression, memory loss and substance abuse. The patient does not have insomnia.    Allergic/Immunologic: Negative for HIV exposure and hives.       Objective:   Physical Exam   Constitutional: She is oriented to person, place, and time. She appears well-nourished.   HENT:   Head: Normocephalic.   Eyes: Pupils are equal, round, and reactive to light.   Neck: Normal carotid pulses and no JVD present. Carotid bruit is not present. No thyromegaly present.   Cardiovascular: Normal rate, regular rhythm, normal heart sounds and normal pulses.   No extrasystoles are present. PMI is not displaced.  Exam reveals no gallop and no S3.    No murmur heard.  Pulmonary/Chest: Breath sounds normal. No stridor. No respiratory distress.   Abdominal: Soft. Bowel sounds are normal. There is no tenderness. There is no rebound.   Musculoskeletal: Normal range of motion.   Neurological: She is alert and oriented to person, place, and time.   Skin: Skin is intact. No rash noted.   Psychiatric: Her behavior is normal.       Lab Results   Component Value Date    CHOL 159 01/31/2017    CHOL 187 01/05/2016    CHOL 186 09/09/2014     Lab Results   Component Value Date    HDL 41 01/31/2017    HDL 48 01/05/2016    HDL 42 09/09/2014     Lab Results   Component Value Date    LDLCALC 92.8 01/31/2017    LDLCALC 118.0 01/05/2016    LDLCALC 113.8 09/09/2014     Lab Results   Component Value Date    TRIG 126 01/31/2017    TRIG 105 01/05/2016    TRIG 151 (H) 09/09/2014     Lab Results   Component Value Date    CHOLHDL 25.8 01/31/2017    CHOLHDL 25.7 01/05/2016    CHOLHDL 22.6 09/09/2014        Chemistry        Component Value Date/Time     01/31/2017 0859    K 4.1 01/31/2017 0859     01/31/2017 0859    CO2 33 (H) 01/31/2017 0859    BUN 12 01/31/2017 0859    CREATININE 0.9 01/31/2017 0859     (H) 01/31/2017 0859        Component Value Date/Time    CALCIUM 9.1 01/31/2017 0859    ALKPHOS 98 01/31/2017 0859    AST 24 01/31/2017 0859    ALT 21 01/31/2017 0859    BILITOT 0.4 01/31/2017 0859          Lab Results   Component Value Date    TSH 0.262 (L) 01/31/2017     No results found for: INR, PROTIME  Lab Results   Component Value Date    WBC 7.56 01/31/2017    HGB 14.2 01/31/2017    HCT 44.2 01/31/2017    MCV 95 01/31/2017     01/31/2017     BNP  @LABRCNTIP(BNP,BNPTRIAGEBLO)@  CrCl cannot be calculated (Patient's most recent sCr result is older than the maximum 14 days allowed.).     Assessment:      1. Essential hypertension    2. Preop cardiovascular exam    3. Mixed diabetic hyperlipidemia associated with type 2 diabetes mellitus        Plan:   Low (0.4%) periop risk of CV events for low risk procedure.  Good functional and exercise capacity.  No chest pain, active arrhythmia and CHF symptoms.  Ok to proceed the scheduled surgery without further cardiac study.  Avoid periop fluid overloaded.

## 2017-07-05 ENCOUNTER — PATIENT MESSAGE (OUTPATIENT)
Dept: OBSTETRICS AND GYNECOLOGY | Facility: CLINIC | Age: 62
End: 2017-07-05

## 2017-07-07 ENCOUNTER — PATIENT MESSAGE (OUTPATIENT)
Dept: OBSTETRICS AND GYNECOLOGY | Facility: CLINIC | Age: 62
End: 2017-07-07

## 2017-07-10 ENCOUNTER — PATIENT MESSAGE (OUTPATIENT)
Dept: INTERNAL MEDICINE | Facility: CLINIC | Age: 62
End: 2017-07-10

## 2017-07-10 NOTE — TELEPHONE ENCOUNTER
pls tell pt she needs preop clearance from her PCP and cardiology then make appt for preop w me pls

## 2017-07-11 RX ORDER — BLOOD-GLUCOSE METER
EACH MISCELLANEOUS
Qty: 1 EACH | Refills: 0 | Status: SHIPPED | OUTPATIENT
Start: 2017-07-11 | End: 2018-01-23 | Stop reason: SDUPTHER

## 2017-07-17 ENCOUNTER — TELEPHONE (OUTPATIENT)
Dept: OBSTETRICS AND GYNECOLOGY | Facility: CLINIC | Age: 62
End: 2017-07-17

## 2017-07-17 DIAGNOSIS — N81.6 RECTOCELE: ICD-10-CM

## 2017-07-17 DIAGNOSIS — N81.9 FEMALE GENITAL PROLAPSE, UNSPECIFIED TYPE: Primary | ICD-10-CM

## 2017-07-17 DIAGNOSIS — E66.01 MORBID (SEVERE) OBESITY DUE TO EXCESS CALORIES: ICD-10-CM

## 2017-07-18 ENCOUNTER — HOSPITAL ENCOUNTER (OUTPATIENT)
Dept: RADIOLOGY | Facility: HOSPITAL | Age: 62
Discharge: HOME | End: 2017-07-18
Attending: FAMILY MEDICINE
Payer: MEDICARE

## 2017-07-18 ENCOUNTER — OFFICE VISIT (OUTPATIENT)
Dept: INTERNAL MEDICINE | Facility: CLINIC | Age: 62
End: 2017-07-18
Payer: MEDICARE

## 2017-07-18 VITALS
DIASTOLIC BLOOD PRESSURE: 84 MMHG | TEMPERATURE: 99 F | BODY MASS INDEX: 39.97 KG/M2 | HEIGHT: 64 IN | HEART RATE: 74 BPM | SYSTOLIC BLOOD PRESSURE: 124 MMHG | WEIGHT: 234.13 LBS

## 2017-07-18 DIAGNOSIS — Z12.31 ENCOUNTER FOR SCREENING MAMMOGRAM FOR BREAST CANCER: ICD-10-CM

## 2017-07-18 DIAGNOSIS — E66.01 MORBID OBESITY WITH BODY MASS INDEX OF 40.0-49.9: Chronic | ICD-10-CM

## 2017-07-18 DIAGNOSIS — F33.42 RECURRENT MAJOR DEPRESSION IN FULL REMISSION: Chronic | ICD-10-CM

## 2017-07-18 DIAGNOSIS — E11.69 MIXED DIABETIC HYPERLIPIDEMIA ASSOCIATED WITH TYPE 2 DIABETES MELLITUS: Chronic | ICD-10-CM

## 2017-07-18 DIAGNOSIS — I10 ESSENTIAL HYPERTENSION: Chronic | ICD-10-CM

## 2017-07-18 DIAGNOSIS — E78.2 MIXED DIABETIC HYPERLIPIDEMIA ASSOCIATED WITH TYPE 2 DIABETES MELLITUS: Chronic | ICD-10-CM

## 2017-07-18 DIAGNOSIS — N81.9 FEMALE GENITAL PROLAPSE, UNSPECIFIED TYPE: ICD-10-CM

## 2017-07-18 DIAGNOSIS — E03.4 HYPOTHYROIDISM DUE TO ACQUIRED ATROPHY OF THYROID: Chronic | ICD-10-CM

## 2017-07-18 DIAGNOSIS — Z01.818 PREOP EXAM FOR INTERNAL MEDICINE: Primary | ICD-10-CM

## 2017-07-18 DIAGNOSIS — Z01.818 PREOP EXAM FOR INTERNAL MEDICINE: ICD-10-CM

## 2017-07-18 DIAGNOSIS — N81.6 RECTOCELE: ICD-10-CM

## 2017-07-18 PROCEDURE — 3044F HG A1C LEVEL LT 7.0%: CPT | Mod: S$GLB,,, | Performed by: FAMILY MEDICINE

## 2017-07-18 PROCEDURE — 99499 UNLISTED E&M SERVICE: CPT | Mod: S$GLB,,, | Performed by: FAMILY MEDICINE

## 2017-07-18 PROCEDURE — 99999 PR PBB SHADOW E&M-EST. PATIENT-LVL III: CPT | Mod: PBBFAC,,, | Performed by: FAMILY MEDICINE

## 2017-07-18 PROCEDURE — 99214 OFFICE O/P EST MOD 30 MIN: CPT | Mod: S$GLB,,, | Performed by: FAMILY MEDICINE

## 2017-07-18 PROCEDURE — 71020 XR CHEST PA AND LATERAL: CPT | Mod: TC,PO

## 2017-07-18 PROCEDURE — 71020 XR CHEST PA AND LATERAL: CPT | Mod: 26,,, | Performed by: RADIOLOGY

## 2017-07-18 NOTE — PROGRESS NOTES
Subjective:      Patient ID: Anupama Norman is a 62 y.o. female.    Chief Complaint: Pre-op Exam    Patient is coming in today for preop for GYN surgery for rectal prolapse and PAWEL. She's been doing quite well.  No prior dvts/PEs. No cp. No sob.   She now has diabetes.  She's on metformin now.  A1c is at 6.8. Wt down 12lbs in 6 mo.  She's been trying to exercise more.  She does not want see a dietitian.declines all vaccines.     She has hypothyroidism which is currently controlled 175 µg.  Labs are ordered    Hyperlipidemia currently on a statin therapy.  Slightly elevated as well.    Depressions currently controlled followed by Dr. Cifuentes at the mental health clinic in [place].  She did  and  her .    Hypertension is controlled with a diuretic. Stable.     She declines all vaccines.  She's not willing to do a pneumonia shot today.            Lab Results   Component Value Date    WBC 7.56 01/31/2017    HGB 14.2 01/31/2017    HCT 44.2 01/31/2017     01/31/2017    CHOL 159 01/31/2017    TRIG 126 01/31/2017    HDL 41 01/31/2017    ALT 21 01/31/2017    AST 24 01/31/2017     01/31/2017    K 4.1 01/31/2017     01/31/2017    CREATININE 0.9 01/31/2017    BUN 12 01/31/2017    CO2 33 (H) 01/31/2017    TSH 0.262 (L) 01/31/2017    HGBA1C 6.8 (H) 01/31/2017       Review of Systems   Constitutional: Negative for activity change and unexpected weight change.   HENT: Negative for hearing loss, rhinorrhea and trouble swallowing.    Eyes: Negative for discharge and visual disturbance.   Respiratory: Negative for chest tightness and wheezing.    Cardiovascular: Negative for chest pain and palpitations.   Gastrointestinal: Negative for blood in stool, constipation, diarrhea and vomiting.   Endocrine: Negative for polydipsia and polyuria.   Genitourinary: Negative for difficulty urinating, dysuria, hematuria and menstrual problem.   Musculoskeletal: Negative for arthralgias, joint  swelling and neck pain.   Neurological: Negative for weakness and headaches.   Psychiatric/Behavioral: Negative for confusion and dysphoric mood.     Objective:     Physical Exam   Constitutional: She appears well-developed and well-nourished.   HENT:   Head: Normocephalic and atraumatic.   Right Ear: Tympanic membrane normal.   Left Ear: Tympanic membrane normal.   Mouth/Throat: Oropharynx is clear and moist.   Eyes: Conjunctivae and EOM are normal.   Neck: Normal range of motion. Neck supple.   Cardiovascular: Normal rate and regular rhythm.    Pulmonary/Chest: Effort normal and breath sounds normal.   Abdominal: Soft. Bowel sounds are normal. She exhibits no distension and no mass. There is no tenderness. There is no rebound and no guarding.   Psychiatric: She has a normal mood and affect. Her behavior is normal.   Nursing note and vitals reviewed.    Assessment:     1. Preop exam for internal medicine    2. Female genital prolapse, unspecified type    3. Mixed diabetic hyperlipidemia associated with type 2 diabetes mellitus    4. Hypothyroidism due to acquired atrophy of thyroid    5. Essential hypertension    6. Rectocele    7. Morbid obesity with body mass index of 40.0-49.9    8. Recurrent major depression in full remission    9. Encounter for screening mammogram for breast cancer      Plan:   Anupama was seen today for pre-op exam.    Diagnoses and all orders for this visit:    Preop exam for internal medicine- I reviewed the patient's past medical, surgical, social and family history and with  physical exam findings and the proposed surgery and I make the following recommendations:     From a cardiac standpoint the patient is low risk for surgery with a low risk surgery. She has no evidence of cardiac symptomatology or cardiac diagnoses. The patient may proceed with surgery without further cardiac workup.     From a pulmonary standpoint the patient presents as a good candidate as well. The patient has no  "history of lung disease or pulmonary symptoms. Good pulmonary toilet, incentive spirometry, early ambulation are all recommended to improve the  pulmonary outcome. No further pulmonary workup as noted prior to surgery.   DVT prophylaxis should be per standard. Venous compression devices are recommended. Early ambulation. Patient has been educated on signs and symptoms of both DVT and pulmonary embolus and instructed on what to do if there are symptoms postop.     The patient has been instructed to take  blood pressure medication the morning of surgery with a sip of water. Avoid any aspirin or anti-inflammatories between now and surgery.     If there is any further I can do to assist in the care of this patient please not hesitate to contact me. I will forward the lab results upon my receipt.      -     CBC auto differential; Future  -     X-Ray Chest PA And Lateral; Future    Female genital prolapse, unspecified type- going for gyn surgery with Dr Fang Aug 30.     Mixed diabetic hyperlipidemia associated with type 2 diabetes mellitus- labs today. On statin. Wt down.     Hypothyroidism due to acquired atrophy of thyroid- labs today. Cont with meds and take day of surgery     Essential hypertension - stable, Continue with current medications and interventions. Labs ordered       Rectocele- going for gyn surgery with Dr Fang Aug 30.     Morbid obesity with body mass index of 40.0-49.9- wt down 12lbs with metformin in last 6 months.     Recurrent major depression in full remission- stable, Continue with current medications and interventions. [facility]       Encounter for screening mammogram for breast cancer- schedule  -     Mammo Digital Screening Bilat with CAD; Future      Pt declines all vaccines. She states "it messes me up mentally" .       Return in about 6 months (around 1/18/2018) for physical.          "

## 2017-07-19 ENCOUNTER — PATIENT MESSAGE (OUTPATIENT)
Dept: INTERNAL MEDICINE | Facility: CLINIC | Age: 62
End: 2017-07-19

## 2017-07-19 RX ORDER — LEVOTHYROXINE SODIUM 150 UG/1
TABLET ORAL
Qty: 90 TABLET | Refills: 3 | Status: SHIPPED | OUTPATIENT
Start: 2017-07-19 | End: 2018-01-23 | Stop reason: SDUPTHER

## 2017-07-19 NOTE — TELEPHONE ENCOUNTER
Thyroid is a little over supplemented. decrease dose from 175 to 150mcg. Diabetes numbers improved. Cholesterol, sugar levels, blood counts, kidney, liver functions,  are within goal ranges.    Pt is ok to proceed with surgery.   Kim Krueger MD

## 2017-08-03 ENCOUNTER — HOSPITAL ENCOUNTER (OUTPATIENT)
Dept: RADIOLOGY | Facility: HOSPITAL | Age: 62
Discharge: HOME | End: 2017-08-03
Attending: FAMILY MEDICINE
Payer: MEDICARE

## 2017-08-03 VITALS — WEIGHT: 234 LBS | HEIGHT: 64 IN | BODY MASS INDEX: 39.95 KG/M2

## 2017-08-03 DIAGNOSIS — Z12.31 ENCOUNTER FOR SCREENING MAMMOGRAM FOR BREAST CANCER: ICD-10-CM

## 2017-08-03 PROCEDURE — 77067 SCR MAMMO BI INCL CAD: CPT | Mod: TC

## 2017-08-03 PROCEDURE — 77067 SCR MAMMO BI INCL CAD: CPT | Mod: 26,,, | Performed by: RADIOLOGY

## 2017-08-23 ENCOUNTER — OFFICE VISIT (OUTPATIENT)
Dept: OBSTETRICS AND GYNECOLOGY | Facility: CLINIC | Age: 62
End: 2017-08-23
Payer: MEDICARE

## 2017-08-23 ENCOUNTER — HOSPITAL ENCOUNTER (OUTPATIENT)
Dept: PREADMISSION TESTING | Facility: HOSPITAL | Age: 62
Discharge: HOME | End: 2017-08-23
Attending: FAMILY MEDICINE
Payer: MEDICARE

## 2017-08-23 VITALS — BODY MASS INDEX: 40.63 KG/M2 | SYSTOLIC BLOOD PRESSURE: 126 MMHG | DIASTOLIC BLOOD PRESSURE: 80 MMHG | WEIGHT: 233 LBS

## 2017-08-23 VITALS — WEIGHT: 233 LBS | BODY MASS INDEX: 39.78 KG/M2 | HEIGHT: 64 IN

## 2017-08-23 DIAGNOSIS — Z01.818 PREOP EXAMINATION: Primary | ICD-10-CM

## 2017-08-23 DIAGNOSIS — N81.6 RECTOCELE: ICD-10-CM

## 2017-08-23 DIAGNOSIS — E66.01 MORBID OBESITY WITH BODY MASS INDEX OF 40.0-49.9: Chronic | ICD-10-CM

## 2017-08-23 DIAGNOSIS — N81.9 FEMALE GENITAL PROLAPSE, UNSPECIFIED TYPE: ICD-10-CM

## 2017-08-23 PROCEDURE — 3079F DIAST BP 80-89 MM HG: CPT | Mod: S$GLB,,, | Performed by: OBSTETRICS & GYNECOLOGY

## 2017-08-23 PROCEDURE — 99999 PR PBB SHADOW E&M-EST. PATIENT-LVL II: CPT | Mod: PBBFAC,,, | Performed by: OBSTETRICS & GYNECOLOGY

## 2017-08-23 PROCEDURE — 3008F BODY MASS INDEX DOCD: CPT | Mod: S$GLB,,, | Performed by: OBSTETRICS & GYNECOLOGY

## 2017-08-23 PROCEDURE — 3074F SYST BP LT 130 MM HG: CPT | Mod: S$GLB,,, | Performed by: OBSTETRICS & GYNECOLOGY

## 2017-08-23 PROCEDURE — 99499 UNLISTED E&M SERVICE: CPT | Mod: S$GLB,,, | Performed by: OBSTETRICS & GYNECOLOGY

## 2017-08-23 NOTE — DISCHARGE INSTRUCTIONS
To confirm, Your doctor has instructed you that surgery is scheduled for 8/30/17 at  11:00 am.       Please report to [facility], [address], 1st floor, main lobby by 09:30 am Pre admit office will call afternoon prior to surgery for final arrival time.      INSTRUCTIONS IMPORTANT!!!   Do not eat, drink, or smoke after 12 midnight. May have water or clear liquid juice until 3 hrs prior to surgery. OK to brush teeth, no gum, candy or mints!    ¨ Take only these medicines with a small swallow of water-morning of surgery.  Synthroid   Hold metformin on the 29th  Do not take Maxide the morning of surgery    Pre operative instructions:  Please review the Pre-Operative Instruction booklet that you were given.        Bathing Instructions--See page 6 in the Pre-operative booklet.      Prevention of surgical site infections:     -Keep incisions clean and dry.   -Do not soak/submerge incisions in water until completely healed.   -Do not apply lotions, powders, creams, or deodorants to site.   -Always make sure hands are cleaned with antibacterial soap/ alcohol-based                 prior to touching the surgical site.  (This includes doctors,                 nurses, staff, and yourself.)    Signs and symptoms:   -Redness and pain around the area where you had surgery   -Drainage of cloudy fluid from your surgical wound   -Fever over 100.4       I have read or had read and explained to me, and understand the above information.  Additional comments or instructions:  Received a copy of Pre-operative instructions booklet, FAQ surgical site infection sheet, and packets of hibiclens (if indicated).

## 2017-08-24 NOTE — PROGRESS NOTES
"    62 y.o. Anupama Tabatha [facility]   For preoperative appointment for [facility] BSO with APR  For a  large rectocele in midline just cephalad to the introitus about 1-2cm    Chief Complaint   Patient presents with    Pre-op Exam       Patient Active Problem List    Diagnosis Date Noted    Preop cardiovascular exam 2017    Rectocele 2017    Female genital prolapse 2017    Colon polyp 2016    Morbid obesity with body mass index of 40.0-49.9 2014    Hypothyroidism due to acquired atrophy of thyroid 2013    Essential hypertension     GERD (gastroesophageal reflux disease)     Mixed diabetic hyperlipidemia associated with type 2 diabetes mellitus     Recurrent major depression in full remission 2013       Past Medical History:   Diagnosis Date    Diabetes mellitus     Pre Diabetes    GERD (gastroesophageal reflux disease)     HLD (hyperlipidemia)     HTN (hypertension)     Hypothyroid     Insulin resistance     Obesity     Staph infection        Past Surgical History:   Procedure Laterality Date    APPENDECTOMY  1982    BARTHOLIN GLAND CYST EXCISION Left 2010    Dr ABDOULAYE Fang    BREAST BIOPSY Right     20 years ago. benign.    CATARACT EXTRACTION Bilateral     CHOLECYSTECTOMY  2014    lap choly for biliary dyskinesia    ORIF WRIST FRACTURE  2009    comminuted intraarticular fx of distal Lt radius w/assoc avulsion fx styloid process of ulna - "metalplate       Family History   Problem Relation Age of Onset    Diabetes      Colon cancer Maternal Grandmother     Colon cancer Cousin     Colon cancer Cousin     Breast cancer Neg Hx        Social History     Social History    Marital status: Single     Spouse name: N/A    Number of children: 2    Years of education: N/A     Occupational History    retired      Social History Main Topics    Smoking status: Never Smoker    Smokeless tobacco: Never Used    Alcohol use Yes      Comment: " rarely // once a year    Drug use: No    Sexual activity: Not Currently     Partners: Male     Birth control/ protection: None     Other Topics Concern    None     Social History Narrative    None       Current Outpatient Prescriptions   Medication Sig Dispense Refill    esomeprazole (NEXIUM) 40 MG capsule Take 1 capsule (40 mg total) by mouth before breakfast. (Patient taking differently: Take 40 mg by mouth every evening. ) 90 capsule 3    lancets (TRUEPLUS LANCETS) 28 gauge Misc 1 lancet by Misc.(Non-Drug; Combo Route) route 4 (four) times daily with meals and nightly. [employer] brand 300 each 3    levothyroxine (SYNTHROID) 150 MCG tablet TAKE 1 TABLET  BEFORE BREAKFAST. 90 tablet 3    lovastatin (MEVACOR) 40 MG tablet Take 1 tablet (40 mg total) by mouth nightly. 90 tablet 3    metformin (GLUCOPHAGE-XR) 500 MG 24 hr tablet 4 tab po daily 360 tablet 3    paroxetine (PAXIL) 20 MG tablet Take 10 mg by mouth every morning.       paroxetine (PAXIL) 40 MG tablet Take 40 mg by mouth every evening.       trazodone (DESYREL) 50 MG tablet Take 1 tablet (50 mg total) by mouth every evening. 90 tablet 3    triamterene-hydrochlorothiazide 75-50 mg (MAXZIDE) 75-50 mg per tablet TAKE 1 TABLET ONE TIME DAILY 90 tablet 3    TRUE METRIX AIR GLUCOSE METER kit USE AS DIRECTED 1 each 0    aspirin (ECOTRIN) 81 MG EC tablet Take 1 tablet by mouth Daily.      blood sugar diagnostic (TRUETEST TEST STRIPS) Strp 1 strip by Misc.(Non-Drug; Combo Route) route 4 (four) times daily with meals and nightly. [employer] True Metrix Test Strips 300 strip 3    triamcinolone acetonide 0.1% (KENALOG) 0.1 % cream Apply topically 2 (two) times daily. 80 g 3     No current facility-administered medications for this visit.        Review of patient's allergies indicates:   Allergen Reactions    Betamethasone sodium phosphate      Sweat and depression    Codeine      Other reaction(s): Shortness of breath    Loratadine      Other reaction(s):  Felt distant    Prednisone      Other reaction(s): sweat and depression    Tomato (solanum lycopersicum) Hives and Rash       ROS:  GENERAL: No fever, chills, fatigability or weight loss.  CHEST: no chest pain, shortness of breath or palpitations.  ABDOMEN: Appetite fine. No weight loss. Denies diarrhea, abdominal pain, hematemesis or blood in stool.  URINARY: No flank pain, dysuria or hematuria.  BREASTS: Breasts symmetric, nontender and no lumps detected.       PHYSICAL EXAM    Vitals:    08/23/17 1009   BP: 126/80       APPEARANCE: Well nourished, well developed, in no acute distress.  CHEST: CTAB    CVS: RRR   EXTREMITIES: no socorro's, calf tenderness      COUNSELING  Discussed with patient the risks of surgery, including but not limited to, risks of anesthesia, infection, bleeding, injury to other organs, such as skin, nerves, arteries, veins, bowel, bladder, ureters, with possible need for reparative or subsequent surgery such as bowel resection/repair, hernia, colostomy, bladder/ureter surgery, blood transfusion, posible recurence of the prolapse w repeat surgery needed . HRT with its inherent risks ie MI, PE, DVT, CVA, BRCA was discussed w/ risk of BSO. There is also risks of development of deep venous thrombosis, pulmonary embolus, myocardial infarction, possible death. The patient verbalizes understanding. Discussed with the Patient the risks/benefits/alternatives of the treatment options.  Consents were signed. Pamphlets given.

## 2017-08-29 ENCOUNTER — ANESTHESIA EVENT (OUTPATIENT)
Dept: SURGERY | Facility: HOSPITAL | Age: 62
End: 2017-08-29
Payer: MEDICARE

## 2017-08-30 ENCOUNTER — SURGERY (OUTPATIENT)
Age: 62
End: 2017-08-30

## 2017-08-30 ENCOUNTER — HOSPITAL ENCOUNTER (OUTPATIENT)
Facility: HOSPITAL | Age: 62
Discharge: HOME | End: 2017-08-31
Attending: OBSTETRICS & GYNECOLOGY | Admitting: OBSTETRICS & GYNECOLOGY
Payer: MEDICARE

## 2017-08-30 ENCOUNTER — ANESTHESIA (OUTPATIENT)
Dept: SURGERY | Facility: HOSPITAL | Age: 62
End: 2017-08-30
Payer: MEDICARE

## 2017-08-30 DIAGNOSIS — N81.6 RECTOCELE: Primary | ICD-10-CM

## 2017-08-30 DIAGNOSIS — Z90.710 S/P HYSTERECTOMY: ICD-10-CM

## 2017-08-30 DIAGNOSIS — N83.42: ICD-10-CM

## 2017-08-30 DIAGNOSIS — N81.9 FEMALE GENITAL PROLAPSE, UNSPECIFIED TYPE: ICD-10-CM

## 2017-08-30 PROBLEM — N81.4 UTERINE PROLAPSE: Status: ACTIVE | Noted: 2017-06-24

## 2017-08-30 LAB — POCT GLUCOSE: 118 MG/DL (ref 70–110)

## 2017-08-30 PROCEDURE — 71000033 HC RECOVERY, INTIAL HOUR: Performed by: OBSTETRICS & GYNECOLOGY

## 2017-08-30 PROCEDURE — 25000003 PHARM REV CODE 250: Performed by: OBSTETRICS & GYNECOLOGY

## 2017-08-30 PROCEDURE — 63600175 PHARM REV CODE 636 W HCPCS: Performed by: ANESTHESIOLOGY

## 2017-08-30 PROCEDURE — 57210 REPAIR VAGINA/PERINEUM: CPT | Mod: 51,,, | Performed by: OBSTETRICS & GYNECOLOGY

## 2017-08-30 PROCEDURE — 57282 COLPOPEXY EXTRAPERITONEAL: CPT | Mod: 51,,, | Performed by: OBSTETRICS & GYNECOLOGY

## 2017-08-30 PROCEDURE — 99900035 HC TECH TIME PER 15 MIN (STAT)

## 2017-08-30 PROCEDURE — 71000039 HC RECOVERY, EACH ADD'L HOUR: Performed by: OBSTETRICS & GYNECOLOGY

## 2017-08-30 PROCEDURE — 88305 TISSUE EXAM BY PATHOLOGIST: CPT | Performed by: PATHOLOGY

## 2017-08-30 PROCEDURE — 88305 TISSUE EXAM BY PATHOLOGIST: CPT | Mod: 26,,, | Performed by: PATHOLOGY

## 2017-08-30 PROCEDURE — 27201423 OPTIME MED/SURG SUP & DEVICES STERILE SUPPLY: Performed by: OBSTETRICS & GYNECOLOGY

## 2017-08-30 PROCEDURE — 37000009 HC ANESTHESIA EA ADD 15 MINS: Performed by: OBSTETRICS & GYNECOLOGY

## 2017-08-30 PROCEDURE — 63600175 PHARM REV CODE 636 W HCPCS: Performed by: NURSE ANESTHETIST, CERTIFIED REGISTERED

## 2017-08-30 PROCEDURE — 58571 TLH W/T/O 250 G OR LESS: CPT | Mod: ,,, | Performed by: OBSTETRICS & GYNECOLOGY

## 2017-08-30 PROCEDURE — 94799 UNLISTED PULMONARY SVC/PX: CPT

## 2017-08-30 PROCEDURE — 63600175 PHARM REV CODE 636 W HCPCS: Performed by: OBSTETRICS & GYNECOLOGY

## 2017-08-30 PROCEDURE — 37000008 HC ANESTHESIA 1ST 15 MINUTES: Performed by: OBSTETRICS & GYNECOLOGY

## 2017-08-30 PROCEDURE — C2628 CATHETER, OCCLUSION: HCPCS | Performed by: OBSTETRICS & GYNECOLOGY

## 2017-08-30 PROCEDURE — 36000712 HC OR TIME LEV V 1ST 15 MIN: Performed by: OBSTETRICS & GYNECOLOGY

## 2017-08-30 PROCEDURE — 25000003 PHARM REV CODE 250: Performed by: NURSE ANESTHETIST, CERTIFIED REGISTERED

## 2017-08-30 PROCEDURE — 36000713 HC OR TIME LEV V EA ADD 15 MIN: Performed by: OBSTETRICS & GYNECOLOGY

## 2017-08-30 RX ORDER — ONDANSETRON 8 MG/1
8 TABLET, ORALLY DISINTEGRATING ORAL EVERY 8 HOURS PRN
Status: DISCONTINUED | OUTPATIENT
Start: 2017-08-30 | End: 2017-08-31 | Stop reason: HOSPADM

## 2017-08-30 RX ORDER — SODIUM CHLORIDE, SODIUM LACTATE, POTASSIUM CHLORIDE, CALCIUM CHLORIDE 600; 310; 30; 20 MG/100ML; MG/100ML; MG/100ML; MG/100ML
INJECTION, SOLUTION INTRAVENOUS CONTINUOUS
Status: DISCONTINUED | OUTPATIENT
Start: 2017-08-30 | End: 2017-08-30

## 2017-08-30 RX ORDER — ACETAMINOPHEN 10 MG/ML
1000 INJECTION, SOLUTION INTRAVENOUS ONCE
Status: COMPLETED | OUTPATIENT
Start: 2017-08-30 | End: 2017-08-30

## 2017-08-30 RX ORDER — FENTANYL CITRATE 50 UG/ML
INJECTION, SOLUTION INTRAMUSCULAR; INTRAVENOUS
Status: DISCONTINUED | OUTPATIENT
Start: 2017-08-30 | End: 2017-08-30

## 2017-08-30 RX ORDER — SODIUM CHLORIDE 9 MG/ML
INJECTION, SOLUTION INTRAVENOUS CONTINUOUS
Status: DISCONTINUED | OUTPATIENT
Start: 2017-08-30 | End: 2017-08-31 | Stop reason: HOSPADM

## 2017-08-30 RX ORDER — METFORMIN HYDROCHLORIDE 500 MG/1
500 TABLET, EXTENDED RELEASE ORAL
Status: DISCONTINUED | OUTPATIENT
Start: 2017-08-30 | End: 2017-08-31 | Stop reason: HOSPADM

## 2017-08-30 RX ORDER — SIMETHICONE 80 MG
80 TABLET,CHEWABLE ORAL EVERY 4 HOURS PRN
Status: DISCONTINUED | OUTPATIENT
Start: 2017-08-30 | End: 2017-08-31 | Stop reason: HOSPADM

## 2017-08-30 RX ORDER — PROPOFOL 10 MG/ML
VIAL (ML) INTRAVENOUS
Status: DISCONTINUED | OUTPATIENT
Start: 2017-08-30 | End: 2017-08-30

## 2017-08-30 RX ORDER — LEVOTHYROXINE SODIUM 150 UG/1
150 TABLET ORAL
Status: DISCONTINUED | OUTPATIENT
Start: 2017-08-31 | End: 2017-08-31 | Stop reason: HOSPADM

## 2017-08-30 RX ORDER — OXYCODONE AND ACETAMINOPHEN 10; 325 MG/1; MG/1
1 TABLET ORAL EVERY 4 HOURS PRN
Status: DISCONTINUED | OUTPATIENT
Start: 2017-08-30 | End: 2017-08-31 | Stop reason: HOSPADM

## 2017-08-30 RX ORDER — MIDAZOLAM HYDROCHLORIDE 1 MG/ML
INJECTION, SOLUTION INTRAMUSCULAR; INTRAVENOUS
Status: DISCONTINUED | OUTPATIENT
Start: 2017-08-30 | End: 2017-08-30

## 2017-08-30 RX ORDER — LOVASTATIN 20 MG/1
40 TABLET ORAL NIGHTLY
Status: DISCONTINUED | OUTPATIENT
Start: 2017-08-30 | End: 2017-08-31 | Stop reason: HOSPADM

## 2017-08-30 RX ORDER — ROCURONIUM BROMIDE 10 MG/ML
INJECTION, SOLUTION INTRAVENOUS
Status: DISCONTINUED | OUTPATIENT
Start: 2017-08-30 | End: 2017-08-30

## 2017-08-30 RX ORDER — TRIAMTERENE AND HYDROCHLOROTHIAZIDE 75; 50 MG/1; MG/1
1 TABLET ORAL DAILY
Status: DISCONTINUED | OUTPATIENT
Start: 2017-08-31 | End: 2017-08-31 | Stop reason: HOSPADM

## 2017-08-30 RX ORDER — PAROXETINE HYDROCHLORIDE 20 MG/1
40 TABLET, FILM COATED ORAL NIGHTLY
Status: DISCONTINUED | OUTPATIENT
Start: 2017-08-30 | End: 2017-08-31 | Stop reason: HOSPADM

## 2017-08-30 RX ORDER — DIPHENHYDRAMINE HCL 25 MG
25 CAPSULE ORAL EVERY 4 HOURS PRN
Status: DISCONTINUED | OUTPATIENT
Start: 2017-08-30 | End: 2017-08-31 | Stop reason: HOSPADM

## 2017-08-30 RX ORDER — HYDROCODONE BITARTRATE AND ACETAMINOPHEN 5; 325 MG/1; MG/1
1 TABLET ORAL EVERY 4 HOURS PRN
Status: DISCONTINUED | OUTPATIENT
Start: 2017-08-30 | End: 2017-08-31 | Stop reason: HOSPADM

## 2017-08-30 RX ORDER — LIDOCAINE HYDROCHLORIDE 20 MG/ML
INJECTION, SOLUTION EPIDURAL; INFILTRATION; INTRACAUDAL; PERINEURAL
Status: DISCONTINUED | OUTPATIENT
Start: 2017-08-30 | End: 2017-08-30

## 2017-08-30 RX ORDER — GLYCOPYRROLATE 0.2 MG/ML
INJECTION INTRAMUSCULAR; INTRAVENOUS
Status: DISCONTINUED | OUTPATIENT
Start: 2017-08-30 | End: 2017-08-30

## 2017-08-30 RX ORDER — MEPERIDINE HYDROCHLORIDE 50 MG/ML
12.5 INJECTION INTRAMUSCULAR; INTRAVENOUS; SUBCUTANEOUS ONCE AS NEEDED
Status: DISCONTINUED | OUTPATIENT
Start: 2017-08-30 | End: 2017-08-30

## 2017-08-30 RX ORDER — KETOROLAC TROMETHAMINE 30 MG/ML
INJECTION, SOLUTION INTRAMUSCULAR; INTRAVENOUS
Status: DISCONTINUED | OUTPATIENT
Start: 2017-08-30 | End: 2017-08-30

## 2017-08-30 RX ORDER — ONDANSETRON 2 MG/ML
INJECTION INTRAMUSCULAR; INTRAVENOUS
Status: DISCONTINUED | OUTPATIENT
Start: 2017-08-30 | End: 2017-08-30

## 2017-08-30 RX ORDER — SUCCINYLCHOLINE CHLORIDE 20 MG/ML
INJECTION INTRAMUSCULAR; INTRAVENOUS
Status: DISCONTINUED | OUTPATIENT
Start: 2017-08-30 | End: 2017-08-30

## 2017-08-30 RX ORDER — SODIUM CHLORIDE 0.9 % (FLUSH) 0.9 %
3 SYRINGE (ML) INJECTION EVERY 8 HOURS
Status: DISCONTINUED | OUTPATIENT
Start: 2017-08-30 | End: 2017-08-30

## 2017-08-30 RX ORDER — TRAZODONE HYDROCHLORIDE 50 MG/1
50 TABLET ORAL NIGHTLY
Status: DISCONTINUED | OUTPATIENT
Start: 2017-08-30 | End: 2017-08-31 | Stop reason: HOSPADM

## 2017-08-30 RX ORDER — NEOSTIGMINE METHYLSULFATE 1 MG/ML
INJECTION, SOLUTION INTRAVENOUS
Status: DISCONTINUED | OUTPATIENT
Start: 2017-08-30 | End: 2017-08-30

## 2017-08-30 RX ORDER — DOCUSATE SODIUM 100 MG/1
100 CAPSULE, LIQUID FILLED ORAL 2 TIMES DAILY
Status: DISCONTINUED | OUTPATIENT
Start: 2017-08-30 | End: 2017-08-31 | Stop reason: HOSPADM

## 2017-08-30 RX ORDER — SODIUM CHLORIDE 0.9 % (FLUSH) 0.9 %
3 SYRINGE (ML) INJECTION
Status: DISCONTINUED | OUTPATIENT
Start: 2017-08-30 | End: 2017-08-30

## 2017-08-30 RX ORDER — PANTOPRAZOLE SODIUM 40 MG/1
40 TABLET, DELAYED RELEASE ORAL DAILY
Status: DISCONTINUED | OUTPATIENT
Start: 2017-08-31 | End: 2017-08-31 | Stop reason: HOSPADM

## 2017-08-30 RX ORDER — HYDROMORPHONE HYDROCHLORIDE 2 MG/ML
1 INJECTION, SOLUTION INTRAMUSCULAR; INTRAVENOUS; SUBCUTANEOUS EVERY 4 HOURS PRN
Status: DISCONTINUED | OUTPATIENT
Start: 2017-08-30 | End: 2017-08-31 | Stop reason: HOSPADM

## 2017-08-30 RX ORDER — PAROXETINE 10 MG/1
10 TABLET, FILM COATED ORAL EVERY MORNING
Status: DISCONTINUED | OUTPATIENT
Start: 2017-08-31 | End: 2017-08-31 | Stop reason: HOSPADM

## 2017-08-30 RX ORDER — HYDROMORPHONE HYDROCHLORIDE 2 MG/ML
0.2 INJECTION, SOLUTION INTRAMUSCULAR; INTRAVENOUS; SUBCUTANEOUS EVERY 5 MIN PRN
Status: DISCONTINUED | OUTPATIENT
Start: 2017-08-30 | End: 2017-08-30

## 2017-08-30 RX ADMIN — FENTANYL CITRATE 100 MCG: 50 INJECTION, SOLUTION INTRAMUSCULAR; INTRAVENOUS at 11:08

## 2017-08-30 RX ADMIN — METFORMIN HYDROCHLORIDE 500 MG: 500 TABLET, EXTENDED RELEASE ORAL at 05:08

## 2017-08-30 RX ADMIN — ACETAMINOPHEN 1000 MG: 10 INJECTION, SOLUTION INTRAVENOUS at 03:08

## 2017-08-30 RX ADMIN — SODIUM CHLORIDE, SODIUM LACTATE, POTASSIUM CHLORIDE, AND CALCIUM CHLORIDE: 600; 310; 30; 20 INJECTION, SOLUTION INTRAVENOUS at 11:08

## 2017-08-30 RX ADMIN — ROCURONIUM BROMIDE 20 MG: 10 INJECTION, SOLUTION INTRAVENOUS at 11:08

## 2017-08-30 RX ADMIN — PAROXETINE HYDROCHLORIDE 40 MG: 20 TABLET, FILM COATED ORAL at 09:08

## 2017-08-30 RX ADMIN — NEOSTIGMINE METHYLSULFATE 4 MG: 1 INJECTION INTRAVENOUS at 02:08

## 2017-08-30 RX ADMIN — PROPOFOL 30 MG: 10 INJECTION, EMULSION INTRAVENOUS at 11:08

## 2017-08-30 RX ADMIN — LOVASTATIN 40 MG: 20 TABLET ORAL at 09:08

## 2017-08-30 RX ADMIN — FENTANYL CITRATE 50 MCG: 50 INJECTION, SOLUTION INTRAMUSCULAR; INTRAVENOUS at 12:08

## 2017-08-30 RX ADMIN — LIDOCAINE HYDROCHLORIDE 40 MG: 20 INJECTION, SOLUTION EPIDURAL; INFILTRATION; INTRACAUDAL; PERINEURAL at 11:08

## 2017-08-30 RX ADMIN — ROCURONIUM BROMIDE 5 MG: 10 INJECTION, SOLUTION INTRAVENOUS at 11:08

## 2017-08-30 RX ADMIN — ROBINUL 0.6 MG: 0.2 INJECTION INTRAMUSCULAR; INTRAVENOUS at 02:08

## 2017-08-30 RX ADMIN — FENTANYL CITRATE 50 MCG: 50 INJECTION, SOLUTION INTRAMUSCULAR; INTRAVENOUS at 02:08

## 2017-08-30 RX ADMIN — ROCURONIUM BROMIDE 25 MG: 10 INJECTION, SOLUTION INTRAVENOUS at 11:08

## 2017-08-30 RX ADMIN — HYDROMORPHONE HYDROCHLORIDE 0.2 MG: 2 INJECTION, SOLUTION INTRAMUSCULAR; INTRAVENOUS; SUBCUTANEOUS at 04:08

## 2017-08-30 RX ADMIN — SODIUM CHLORIDE, SODIUM LACTATE, POTASSIUM CHLORIDE, AND CALCIUM CHLORIDE: 600; 310; 30; 20 INJECTION, SOLUTION INTRAVENOUS at 12:08

## 2017-08-30 RX ADMIN — FENTANYL CITRATE 50 MCG: 50 INJECTION, SOLUTION INTRAMUSCULAR; INTRAVENOUS at 01:08

## 2017-08-30 RX ADMIN — MIDAZOLAM HYDROCHLORIDE 2 MG: 1 INJECTION, SOLUTION INTRAMUSCULAR; INTRAVENOUS at 11:08

## 2017-08-30 RX ADMIN — SODIUM CHLORIDE: 0.9 INJECTION, SOLUTION INTRAVENOUS at 05:08

## 2017-08-30 RX ADMIN — TRAZODONE HYDROCHLORIDE 50 MG: 50 TABLET ORAL at 08:08

## 2017-08-30 RX ADMIN — ONDANSETRON 4 MG: 2 INJECTION, SOLUTION INTRAMUSCULAR; INTRAVENOUS at 12:08

## 2017-08-30 RX ADMIN — PROPOFOL 140 MG: 10 INJECTION, EMULSION INTRAVENOUS at 11:08

## 2017-08-30 RX ADMIN — KETOROLAC TROMETHAMINE 30 MG: 30 INJECTION, SOLUTION INTRAMUSCULAR; INTRAVENOUS at 02:08

## 2017-08-30 RX ADMIN — SUCCINYLCHOLINE CHLORIDE 120 MG: 20 INJECTION, SOLUTION INTRAMUSCULAR; INTRAVENOUS at 11:08

## 2017-08-30 RX ADMIN — PROPOFOL 30 MG: 10 INJECTION, EMULSION INTRAVENOUS at 12:08

## 2017-08-30 RX ADMIN — HYDROCODONE BITARTRATE AND ACETAMINOPHEN 1 TABLET: 5; 325 TABLET ORAL at 08:08

## 2017-08-30 RX ADMIN — CEFAZOLIN SODIUM 100 ML: 2 SOLUTION INTRAVENOUS at 11:08

## 2017-08-30 RX ADMIN — DOCUSATE SODIUM 100 MG: 100 CAPSULE, LIQUID FILLED ORAL at 08:08

## 2017-08-30 NOTE — ANESTHESIA PREPROCEDURE EVALUATION
08/30/2017  Anupama Norman is a 62 y.o., female.    Anesthesia Evaluation    I have reviewed the Patient Summary Reports.    I have reviewed the Nursing Notes.      Review of Systems  Anesthesia Hx:  No problems with previous Anesthesia Denies Hx of Anesthetic complications  History of prior surgery of interest to airway management or planning: Previous anesthesia: General Denies Family Hx of Anesthesia complications.   Denies Personal Hx of Anesthesia complications.   Social:  Non-Smoker, Social Alcohol Use    Hematology/Oncology:  Hematology Normal   Oncology Normal     EENT/Dental:EENT/Dental Normal   Cardiovascular:   Hypertension, well controlled hyperlipidemia    Pulmonary:  Pulmonary Normal    Renal/:  Renal/ Normal     Hepatic/GI:   GERD, well controlled    Musculoskeletal:  Musculoskeletal Normal    Neurological:  Neurology Normal    Endocrine:   Diabetes, well controlled, type 2 Hypothyroidism    Dermatological:  Skin Normal    Psych:   depression          Physical Exam  General:  Obesity    Airway/Jaw/Neck:  Airway Findings: Mouth Opening: Normal Tongue: Normal  General Airway Assessment: Adult  Mallampati: II  TM Distance: Normal, at least 6 cm  Jaw/Neck Findings:     Neck ROM: Normal ROM      Dental:  Dental Findings: Upper Dentures    Chest/Lungs:  Chest/Lungs Findings: Clear to auscultation, Normal Respiratory Rate     Heart/Vascular:  Heart Findings: Rate: Normal  Rhythm: Regular Rhythm  Sounds: Normal        Mental Status:  Mental Status Findings:  Cooperative, Alert and Oriented         Anesthesia Plan  Type of Anesthesia, risks & benefits discussed:  Anesthesia Type:  general  Patient's Preference:   Intra-op Monitoring Plan: standard ASA monitors  Intra-op Monitoring Plan Comments:   Post Op Pain Control Plan:   Post Op Pain Control Plan Comments:   Induction:   IV  Beta  Blocker:  Patient is not currently on a Beta-Blocker (No further documentation required).       Informed Consent: Patient understands risks and agrees with Anesthesia plan.  Questions answered. Anesthesia consent signed with patient.  ASA Score: 2     Day of Surgery Review of History & Physical: I have interviewed and examined the patient. I have reviewed the patient's H&P dated: 8-30-17.           Ready For Surgery From Anesthesia Perspective.

## 2017-08-30 NOTE — HOSPITAL COURSE
8/30/2017                      SURGEON:  Amelia Fang M.D.                                           PREOPERATIVE DIAGNOSES:  Pelvic pain, uterine prolapse, rectocele                              POSTOPERATIVE DIAGNOSES:  Same, no cystocele (in fact anterior vaginal mucosa was naturally flat with no laxity or prolapse even with valsalva under anesthesia)  PROCEDURE:    Robotic-assisted laparoscopic hysterectomy, bilateral salpingo-oophorectomy, posterior colporrhaphy, sacrospinous ligament fixation, perineorrhaphy         EBL: 250mL

## 2017-08-30 NOTE — PLAN OF CARE
Problem: Patient Care Overview  Goal: Plan of Care Review  Outcome: Ongoing (interventions implemented as appropriate)    Reviewed plan of care, including indications and possible side effects of administered medications. Remains free from injury at this time. Respirations even and unlabored. Bed locked and in lowest position. Call light within reach. Side rails up x2.  Patient verbalized understanding and teach back. Daughter at bedside.     12 hour chart check complete.

## 2017-08-30 NOTE — OP NOTE
OPERATIVE NOTE    8/30/2017     PRE-PROCEDURE COUNSELING  Patient counseled on the risks, benefits, and alternatives to procedure.  Please see preoperative consents.                                                    SURGEON:  Amelia Fang M.D.                                                                                                                         ASSISTANT:  SELENA Tamez                                                                                                                           PREOPERATIVE DIAGNOSES:  Pelvic pain, uterine prolapse, rectocele                                                                                                           POSTOPERATIVE DIAGNOSES:  Same, no cystocele (in fact anterior vaginal mucosa was naturally flat with no laxity or prolapse even with valsalva under anesthesia)                                                                                                                         PROCEDURE:    Robotic-assisted laparoscopic hysterectomy, bilateral salpingo-oophorectomy, posterior colporrhaphy, sacrospinous ligament fixation, perineorrhaphy          EBL: 250mL                      FINDINGS: benign appearing pelvic organs, no cystocele                                                                                                                             PROCEDURE IN DETAIL:    After discussion of the risks, benefits and alternatives, the patient understood the potential risks and complications and signed consents were reviewed. Patient was given preoperative sequential compression devices and antibiotics and was taken to the Operating Room where the general endotracheal anesthesia was administered and found to be adequate.  She was prepped and draped in routine fashion. The ANGY manipulator was applied in routine fashion.  Attention was turned to the abdomen where the anterior abdominal wall was grasped with towel clamps and  elevated.  Veress needle was introduced through the umbilicus and abdomen was insufflated with CO2 gas to 15 mmHg.  An 8mm incision was made in the umbilicus and an 8mm trocar was placed into the abdomen. Under direct visualization, ancillary ports were placed.  This included 8 mm lateral ports and 5 mm left upper quadrant port.  The patient was noted to be in correct anatomical position, placed in Trendelenburg, and robotic operating system was advanced towards the patient. Robotic arms were attached to trocar ends and the operating instruments were placed into the abdomen under direct visualization from the console.This included PK Gyrus for cautery and EndoShears for cold-cut transection.  A survey of the pelvis was made. All pedicles were surgically transected in a similar fashion: with PK cautery followed by a cold cut transection.      The path of the ureter was visualized. Next, the right infundibulopelvic ligament was cauterized and transected followed by successive pedicles of the posterior broad ligament, round ligament and carried through the anterior broad ligament to create a bladder flap. Same was performed on the other side and the uterine arteries were skeletonized on both sides. Anterior followed by posterior colpotomies were made.  Next, the left uterine artery was then cauterized and transected followed by successive pedicles of the cardinal ligament to reach the colpotomy site. The same was performed on the other side and the uterus, cervix, tubes and ovaries were removed through the vagina and sent to pathology.  The vaginal incision was then reapproximated with 0 Vicryl in a running locked fashion.  Lapra-Tys were applied at the ends of the incision, and an intracorporal knot was tied in the center of the incision.  Irrigation, desufflation and reinsufflation confirmed hemostasis.      The fascia of the abdominal incisions closed under direct visualization with 0-vicryl. The skin was  reapproximated with 4-0 vicryl and supported with Dermabond.         Attention was turned to the vagina.   A midline incision was then made along the posterior vagina. The underlying fascial layers were  dissected off the vaginal mucosa, and the rectovaginal fascia was reapproximated with interrrupted 0-vicryl. The overlying vaginal mucosa was trimmed and suture was started with running 2-0 Vicryl.  The posterior portion of the vaginal cuff was held on tension to allow for dissection retroperitoneally to identify the right sacrospinous ligament . Using the Vj Device, a 0 Vicryl suture was placed 1 cm and 2 cm medial to the right  spinous process. The vaginal mucosa was then closed in the midline with running 2-0 Vicryl half way, then completed after SSLF sutures were tied. The SSLF sutures were attached to the vaginal apices. The sutures were tied, pulling the cuff to the sacrospinous ligament  The vaginal mucosa was closed with a running lock stitch using 2-0 Vicryl, and completing a routine perineorrhaphy.     Rectal exam confirmed no mechanical defects or injuries in the rectum.     Vag-pack placed with iodoform gauze. Gresham catheter placed.   Lap count, needle, instrument count was correct x2.    Disposition: recovery room    Condition: Good

## 2017-08-30 NOTE — ASSESSMENT & PLAN NOTE
8/30/2017                      SURGEON:  Amelia Fang M.D.                                           PREOPERATIVE DIAGNOSES:  Pelvic pain, uterine prolapse, rectocele                              POSTOPERATIVE DIAGNOSES:  Same, no cystocele (in fact anterior vaginal mucosa was naturally flat with no laxity or prolapse even with valsalva under anesthesia)   PROCEDURE:    Robotic-assisted laparoscopic hysterectomy, bilateral salpingo-oophorectomy, posterior colporrhaphy, sacrospinous ligament fixation, perineorrhaphy         EBL: 250mL    No complications

## 2017-08-30 NOTE — H&P
"62 y.o. Anupama Ann [facility]     for [facility] BSO with APR  For a  large rectocele in midline just cephalad to the introitus about 1-2cm  One new c/ since last visit: she has L shoulder pain with movement. She was working on repairing a trailer yesterday and did a lot of manual labor with torsion of her L shoulder.   No CP PALP SOB.         Chief Complaint   Patient presents with    Pre-op Exam              Patient Active Problem List     Diagnosis Date Noted    Preop cardiovascular exam 2017    Rectocele 2017    Female genital prolapse 2017    Colon polyp 2016    Morbid obesity with body mass index of 40.0-49.9 2014    Hypothyroidism due to acquired atrophy of thyroid 2013    Essential hypertension      GERD (gastroesophageal reflux disease)      Mixed diabetic hyperlipidemia associated with type 2 diabetes mellitus      Recurrent major depression in full remission 2013              Past Medical History:   Diagnosis Date    Diabetes mellitus       Pre Diabetes    GERD (gastroesophageal reflux disease)      HLD (hyperlipidemia)      HTN (hypertension)      Hypothyroid      Insulin resistance      Obesity      Staph infection                 Past Surgical History:   Procedure Laterality Date    APPENDECTOMY   1982    BARTHOLIN GLAND CYST EXCISION Left 2010     Dr ABDOULAYE Fang    BREAST BIOPSY Right       20 years ago. benign.    CATARACT EXTRACTION Bilateral      CHOLECYSTECTOMY   2014     lap choly for biliary dyskinesia    ORIF WRIST FRACTURE   2009     comminuted intraarticular fx of distal Lt radius w/assoc avulsion fx styloid process of ulna - "metalplate               Family History   Problem Relation Age of Onset    Diabetes        Colon cancer Maternal Grandmother      Colon cancer Cousin      Colon cancer Cousin      Breast cancer Neg Hx           Social History            Social History    Marital status: Single       " Spouse name: N/A    Number of children: 2    Years of education: N/A           Occupational History    retired               Social History Main Topics    Smoking status: Never Smoker    Smokeless tobacco: Never Used    Alcohol use Yes         Comment: rarely // once a year    Drug use: No    Sexual activity: Not Currently       Partners: Male       Birth control/ protection: None           Other Topics Concern    None          Social History Narrative    None         Current Medications          Current Outpatient Prescriptions   Medication Sig Dispense Refill    esomeprazole (NEXIUM) 40 MG capsule Take 1 capsule (40 mg total) by mouth before breakfast. (Patient taking differently: Take 40 mg by mouth every evening. ) 90 capsule 3    lancets (TRUEPLUS LANCETS) 28 gauge Misc 1 lancet by Misc.(Non-Drug; Combo Route) route 4 (four) times daily with meals and nightly. [employer] brand 300 each 3    levothyroxine (SYNTHROID) 150 MCG tablet TAKE 1 TABLET  BEFORE BREAKFAST. 90 tablet 3    lovastatin (MEVACOR) 40 MG tablet Take 1 tablet (40 mg total) by mouth nightly. 90 tablet 3    metformin (GLUCOPHAGE-XR) 500 MG 24 hr tablet 4 tab po daily 360 tablet 3    paroxetine (PAXIL) 20 MG tablet Take 10 mg by mouth every morning.         paroxetine (PAXIL) 40 MG tablet Take 40 mg by mouth every evening.         trazodone (DESYREL) 50 MG tablet Take 1 tablet (50 mg total) by mouth every evening. 90 tablet 3    triamterene-hydrochlorothiazide 75-50 mg (MAXZIDE) 75-50 mg per tablet TAKE 1 TABLET ONE TIME DAILY 90 tablet 3    TRUE METRIX AIR GLUCOSE METER kit USE AS DIRECTED 1 each 0    aspirin (ECOTRIN) 81 MG EC tablet Take 1 tablet by mouth Daily.        blood sugar diagnostic (TRUETEST TEST STRIPS) Strp 1 strip by Misc.(Non-Drug; Combo Route) route 4 (four) times daily with meals and nightly. [employer] True Metrix Test Strips 300 strip 3    triamcinolone acetonide 0.1% (KENALOG) 0.1 % cream Apply topically 2 (two)  "times daily. 80 g 3      No current facility-administered medications for this visit.                   Review of patient's allergies indicates:   Allergen Reactions    Betamethasone sodium phosphate         Sweat and depression    Codeine         Other reaction(s): Shortness of breath    Loratadine         Other reaction(s): Felt distant    Prednisone         Other reaction(s): sweat and depression    Tomato (solanum lycopersicum) Hives and Rash         ROS:  GENERAL: No fever, chills, fatigability or weight loss.  CHEST: no chest pain, shortness of breath or palpitations.  ABDOMEN: Appetite fine. No weight loss. Denies diarrhea, abdominal pain, hematemesis or blood in stool.  URINARY: No flank pain, dysuria or hematuria.  BREASTS: Breasts symmetric, nontender and no lumps detected.         PHYSICAL EXAM     Vitals:    08/30/17 0949   BP: (!) 143/79   Pulse: 62   Resp: 18   Temp: 97.7 °F (36.5 °C)   TempSrc: Temporal   SpO2: 97%   Weight: 102.9 kg (226 lb 13.7 oz)   Height: 5' 3.5" (1.613 m)         APPEARANCE: Well nourished, well developed, in no acute distress.  CHEST: CTAB    CVS: RRR   EXTREMITIES: no socorro's, calf tenderness        COUNSELING  Discussed with patient the risks of surgery, including but not limited to, risks of anesthesia, infection, bleeding, injury to other organs, such as skin, nerves, arteries, veins, bowel, bladder, ureters, with possible need for reparative or subsequent surgery such as bowel resection/repair, hernia, colostomy, bladder/ureter surgery, blood transfusion, posible recurence of the prolapse w repeat surgery needed . HRT with its inherent risks ie MI, PE, DVT, CVA, BRCA was discussed w/ risk of BSO. There is also risks of development of deep venous thrombosis, pulmonary embolus, myocardial infarction, possible death. The patient verbalizes understanding. Discussed with the Patient the risks/benefits/alternatives of the treatment options.  Consents were signed. Pamphlets " given.

## 2017-08-30 NOTE — HPI
Anupama Mays [facility] 62 y.o.  with rectocele, uterine prolapse, rectocele.

## 2017-08-30 NOTE — ANESTHESIA POSTPROCEDURE EVALUATION
"Anesthesia Post Evaluation    Patient: Anupama Norman    Procedure(s) Performed: Procedure(s) (LRB):  XI ROBOTIC ASSISTED LAPAROSCOPIC HYSTERECTOMY (N/A)  XI ROBOT ASSISTED LAPAROSCOPIC SALPINGO-OOPHERECTOMY (Bilateral)  REPAIR-ANTERIOR-POSTERIOR (N/A)    Final Anesthesia Type: general  Patient location during evaluation: PACU  Patient participation: Yes- Able to Participate  Level of consciousness: awake and alert  Post-procedure vital signs: reviewed and stable  Pain management: adequate  Airway patency: patent  PONV status at discharge: No PONV  Anesthetic complications: no      Cardiovascular status: blood pressure returned to baseline  Respiratory status: unassisted  Hydration status: euvolemic  Follow-up not needed.        Visit Vitals  /62   Pulse (!) 55   Temp 36.7 °C (98.1 °F) (Temporal)   Resp 18   Ht 5' 3.5" (1.613 m)   Wt 102.9 kg (226 lb 13.7 oz)   SpO2 100%   Breastfeeding? No   BMI 39.55 kg/m²       Pain/Jose Dej Esus Score: Pain Assessment Performed: Yes (8/30/2017  9:54 AM)  Presence of Pain: non-verbal indicators absent (8/30/2017  2:45 PM)  Pain Rating Prior to Med Admin: 6 (8/30/2017  3:27 PM)  Jose De Jesus Score: 8 (8/30/2017  2:45 PM)      "

## 2017-08-30 NOTE — TRANSFER OF CARE
"Anesthesia Transfer of Care Note    Patient: Anupama Norman    Procedure(s) Performed: Procedure(s) (LRB):  XI ROBOTIC ASSISTED LAPAROSCOPIC HYSTERECTOMY (N/A)  XI ROBOT ASSISTED LAPAROSCOPIC SALPINGO-OOPHERECTOMY (Bilateral)  REPAIR-ANTERIOR-POSTERIOR (N/A)    Patient location: PACU    Anesthesia Type: general    Transport from OR: Transported from OR on room air with adequate spontaneous ventilation    Post pain: adequate analgesia    Post assessment: no apparent anesthetic complications    Post vital signs: stable    Level of consciousness: awake, alert and oriented    Nausea/Vomiting: no nausea/vomiting    Complications: none    Transfer of care protocol was followed      Last vitals:   Visit Vitals  BP (!) 193/85   Pulse 67   Temp 36.7 °C (98.1 °F) (Temporal)   Resp 15   Ht 5' 3.5" (1.613 m)   Wt 102.9 kg (226 lb 13.7 oz)   SpO2 98%   Breastfeeding? No   BMI 39.55 kg/m²     "

## 2017-08-31 VITALS
DIASTOLIC BLOOD PRESSURE: 74 MMHG | WEIGHT: 226.88 LBS | OXYGEN SATURATION: 98 % | BODY MASS INDEX: 38.73 KG/M2 | SYSTOLIC BLOOD PRESSURE: 126 MMHG | TEMPERATURE: 98 F | HEIGHT: 64 IN | RESPIRATION RATE: 17 BRPM | HEART RATE: 67 BPM

## 2017-08-31 PROCEDURE — 25000003 PHARM REV CODE 250: Performed by: OBSTETRICS & GYNECOLOGY

## 2017-08-31 PROCEDURE — 99900035 HC TECH TIME PER 15 MIN (STAT)

## 2017-08-31 RX ORDER — DOCUSATE SODIUM 100 MG/1
100 CAPSULE, LIQUID FILLED ORAL 2 TIMES DAILY
Qty: 60 CAPSULE | Refills: 2 | Status: SHIPPED | OUTPATIENT
Start: 2017-08-31 | End: 2018-01-23

## 2017-08-31 RX ORDER — HYDROCODONE BITARTRATE AND ACETAMINOPHEN 5; 325 MG/1; MG/1
1 TABLET ORAL EVERY 6 HOURS PRN
Qty: 20 TABLET | Refills: 0 | Status: SHIPPED | OUTPATIENT
Start: 2017-08-31 | End: 2017-09-27

## 2017-08-31 RX ADMIN — SODIUM CHLORIDE: 0.9 INJECTION, SOLUTION INTRAVENOUS at 12:08

## 2017-08-31 RX ADMIN — PANTOPRAZOLE SODIUM 40 MG: 40 TABLET, DELAYED RELEASE ORAL at 08:08

## 2017-08-31 RX ADMIN — TRIAMTERENE AND HYDROCHLOROTHIAZIDE 1 TABLET: 75; 50 TABLET ORAL at 08:08

## 2017-08-31 RX ADMIN — LEVOTHYROXINE SODIUM 150 MCG: 150 TABLET ORAL at 06:08

## 2017-08-31 RX ADMIN — HYDROCODONE BITARTRATE AND ACETAMINOPHEN 1 TABLET: 5; 325 TABLET ORAL at 06:08

## 2017-08-31 RX ADMIN — PAROXETINE 10 MG: 10 TABLET, FILM COATED ORAL at 06:08

## 2017-08-31 RX ADMIN — DOCUSATE SODIUM 100 MG: 100 CAPSULE, LIQUID FILLED ORAL at 08:08

## 2017-08-31 RX ADMIN — HYDROCODONE BITARTRATE AND ACETAMINOPHEN 1 TABLET: 5; 325 TABLET ORAL at 12:08

## 2017-08-31 NOTE — DISCHARGE SUMMARY
[facility] -   Obstetrics & Gynecology  Discharge Summary    Patient Name: Anupama Norman  MRN: [id number]  Admission Date: 2017  Hospital Length of Stay: 0 days  Discharge Date and Time:  2017 6:49 AM  Attending Physician: Amelia Fang MD   Discharging Provider: Amelia Fang MD  Primary Care Provider: Kim Krueger MD    HPI:  Anupama Norman 62 y.o.  with rectocele, uterine prolapse, rectocele.    Post surgery pt did well. Passed flatus. No CP SOB palp.   She will be d/c'd home once she passes urine spontaneously.     Hospital Course:     2017                      SURGEON:  Amelia Fang M.D.                                           PREOPERATIVE DIAGNOSES:  Pelvic pain, uterine prolapse, rectocele                              POSTOPERATIVE DIAGNOSES:  Same, no cystocele (in fact anterior vaginal mucosa was naturally flat with no laxity or prolapse even with valsalva under anesthesia)  PROCEDURE:    Robotic-assisted laparoscopic hysterectomy, bilateral salpingo-oophorectomy, posterior colporrhaphy, sacrospinous ligament fixation, perineorrhaphy         EBL: 250mL    Procedure(s) (LRB):  XI ROBOTIC ASSISTED LAPAROSCOPIC HYSTERECTOMY (N/A)  XI ROBOT ASSISTED LAPAROSCOPIC SALPINGO-OOPHERECTOMY (Bilateral)  REPAIR-ANTERIOR-POSTERIOR (N/A)       Pending Diagnostic Studies:     None        Final Active Diagnoses:    Diagnosis Date Noted POA    Prolapse and hernia of left ovary and fallopian tube [N83.42] 2017 Yes    Rectocele [N81.6] 2017 Yes      Problems Resolved During this Admission:    Diagnosis Date Noted Date Resolved POA        Discharged Condition: good    Disposition: Home or Self Care    Follow Up:  Follow-up Information     Follow up In 4 weeks.               Patient Instructions:     Diet general     Activity as tolerated     Other restrictions (specify):   Order Comments: Pelvic rest - no tampons or intercourse for 6 weeks.      Call MD for:  temperature >100.4     Call MD for:  persistent nausea and vomiting or diarrhea     Call MD for:  severe uncontrolled pain     Call MD for:  redness, tenderness, or signs of infection (pain, swelling, redness, odor or green/yellow discharge around incision site)     Call MD for:  difficulty breathing or increased cough     Call MD for:  severe persistent headache     Call MD for:  persistent dizziness, light-headedness, or visual disturbances     Call MD for:  increased confusion or weakness     Call MD for:   Order Comments: Return if any heavy bleeding that is more than the following:   soaking of 2 regular pads in an hours, 2 hours in a row.       Medications:  Reconciled Home Medications:   Current Discharge Medication List      START taking these medications    Details   docusate sodium (COLACE) 100 MG capsule Take 1 capsule (100 mg total) by mouth 2 (two) times daily.  Qty: 60 capsule, Refills: 2      hydrocodone-acetaminophen 5-325mg (NORCO) 5-325 mg per tablet Take 1 tablet by mouth every 6 (six) hours as needed for Pain.  Qty: 20 tablet, Refills: 0         CONTINUE these medications which have NOT CHANGED    Details   levothyroxine (SYNTHROID) 150 MCG tablet TAKE 1 TABLET  BEFORE BREAKFAST.  Qty: 90 tablet, Refills: 3      lovastatin (MEVACOR) 40 MG tablet Take 1 tablet (40 mg total) by mouth nightly.  Qty: 90 tablet, Refills: 3    Associated Diagnoses: Dyslipidemia      aspirin (ECOTRIN) 81 MG EC tablet Take 1 tablet by mouth Daily.      blood sugar diagnostic (TRUETEST TEST STRIPS) Strp 1 strip by Misc.(Non-Drug; Combo Route) route 4 (four) times daily with meals and nightly. [employer] True Metrix Test Strips  Qty: 300 strip, Refills: 3      esomeprazole (NEXIUM) 40 MG capsule Take 1 capsule (40 mg total) by mouth before breakfast.  Qty: 90 capsule, Refills: 3      lancets (TRUEPLUS LANCETS) 28 gauge Misc 1 lancet by Misc.(Non-Drug; Combo Route) route 4 (four) times daily with meals and  nightly. [employer] brand  Qty: 300 each, Refills: 3      metformin (GLUCOPHAGE-XR) 500 MG 24 hr tablet 4 tab po daily  Qty: 360 tablet, Refills: 3      !! paroxetine (PAXIL) 20 MG tablet Take 10 mg by mouth every morning.       !! paroxetine (PAXIL) 40 MG tablet Take 40 mg by mouth every evening.       trazodone (DESYREL) 50 MG tablet Take 1 tablet (50 mg total) by mouth every evening.  Qty: 90 tablet, Refills: 3      triamcinolone acetonide 0.1% (KENALOG) 0.1 % cream Apply topically 2 (two) times daily.  Qty: 80 g, Refills: 3      triamterene-hydrochlorothiazide 75-50 mg (MAXZIDE) 75-50 mg per tablet TAKE 1 TABLET ONE TIME DAILY  Qty: 90 tablet, Refills: 3      TRUE METRIX AIR GLUCOSE METER kit USE AS DIRECTED  Qty: 1 each, Refills: 0       !! - Potential duplicate medications found. Please discuss with provider.          Amelia Fang MD  Obstetrics & Gynecology  [facility] [place]

## 2017-08-31 NOTE — PROGRESS NOTES
Pt stated that she takes 2000 mg of Metformin daily. Order is for Metformin 500 mg. Notified on-call ob/gyn. Spoke with Dr. Weber. No orders to change dosage, but pt can resume taking usual dosage of metformin 2000 when she returns home.

## 2017-08-31 NOTE — DISCHARGE INSTRUCTIONS
Docusate capsules  What is this medicine?  DOCUSATE (doc CUE sayt) is stool softener. It helps prevent constipation and straining or discomfort associated with hard or dry stools.  How should I use this medicine?  Take this medicine by mouth with a glass of water. Follow the directions on the label. Take your doses at regular intervals. Do not take your medicine more often than directed.  Talk to your pediatrician regarding the use of this medicine in children. While this medicine may be prescribed for children as young as 2 years for selected conditions, precautions do apply.  What side effects may I notice from receiving this medicine?  Side effects that you should report to your doctor or health care professional as soon as possible:  · allergic reactions like skin rash, itching or hives, swelling of the face, lips, or tongue  Side effects that usually do not require medical attention (report to your doctor or health care professional if they continue or are bothersome):  · diarrhea  · stomach cramps  · throat irritation  What may interact with this medicine?  · mineral oil  What if I miss a dose?  If you miss a dose, take it as soon as you can. If it is almost time for your next dose, take only that dose. Do not take double or extra doses.  Where should I keep my medicine?  Keep out of the reach of children.  Store at room temperature between 15 and 30 degrees C (59 and 86 degrees F). Throw away any unused medicine after the expiration date.  What should I tell my health care provider before I take this medicine?  They need to know if you have any of these conditions:  · nausea or vomiting  · severe constipation  · stomach pain  · sudden change in bowel habit lasting more than 2 weeks  · an unusual or allergic reaction to docusate, other medicines, foods, dyes, or preservatives  · pregnant or trying to get pregnant  · breast-feeding  What should I watch for while using this medicine?  Do not use for more  than one week without advice from your doctor or health care professional. If your constipation returns, check with your doctor or health care professional.  Drink plenty of water while taking this medicine. Drinking water helps decrease constipation.  Stop using this medicine and contact your doctor or health care professional if you experience any rectal bleeding or do not have a bowel movement after use. These could be signs of a more serious condition.  Date Last Reviewed:   NOTE:This sheet is a summary. It may not cover all possible information. If you have questions about this medicine, talk to your doctor, pharmacist, or health care provider. Copyright© 2016 [employer]        Acetaminophen; Hydrocodone tablets or capsules  What is this medicine?  ACETAMINOPHEN; HYDROCODONE (a set a TONYA issa fen; adilene droe KOE done) is a pain reliever. It is used to treat moderate to severe pain.  How should I use this medicine?  Take this medicine by mouth with a glass of water. Follow the directions on the prescription label. You can take it with or without food. If it upsets your stomach, take it with food. Do not take your medicine more often than directed.  A special MedGuide will be given to you by the pharmacist with each prescription and refill. Be sure to read this information carefully each time.  Talk to your pediatrician regarding the use of this medicine in children. Special care may be needed.  What side effects may I notice from receiving this medicine?  Side effects that you should report to your doctor or health care professional as soon as possible:  · allergic reactions like skin rash, itching or hives, swelling of the face, lips, or tongue  · breathing problems  · confusion  · redness, blistering, peeling or loosening of the skin, including inside the mouth  · signs and symptoms of low blood pressure like dizziness; feeling faint or lightheaded, falls; unusually weak or tired  · trouble passing urine or  change in the amount of urine  · yellowing of the eyes or skin  Side effects that usually do not require medical attention (report to your doctor or health care professional if they continue or are bothersome):  · constipation  · dry mouth  · nausea, vomiting  · tiredness  What may interact with this medicine?  This medicine may interact with the following medications:  · alcohol  · antiviral medicines for HIV or AIDS  · atropine  · antihistamines for allergy, cough and cold  · certain antibiotics like erythromycin, clarithromycin  · certain medicines for anxiety or sleep  · certain medicines for bladder problems like oxybutynin, tolterodine  · certain medicines for depression like amitriptyline, fluoxetine, sertraline  · certain medicines for fungal infections like ketoconazole and itraconazole  · certain medicines for Parkinson's disease like benztropine, trihexyphenidyl  · certain medicines for seizures like carbamazepine, phenobarbital, phenytoin, primidone  · certain medicines for stomach problems like dicyclomine, hyoscyamine  · certain medicines for travel sickness like scopolamine  · general anesthetics like halothane, isoflurane, methoxyflurane, propofol  · ipratropium  · local anesthetics like lidocaine, pramoxine, tetracaine  · MAOIs like Carbex, Eldepryl, Marplan, Nardil, and Parnate  · medicines that relax muscles for surgery  · other medicines with acetaminophen  · other narcotic medicines for pain or cough  · phenothiazines like chlorpromazine, mesoridazine, prochlorperazine, thioridazine  · rifampin  What if I miss a dose?  If you miss a dose, take it as soon as you can. If it is almost time for your next dose, take only that dose. Do not take double or extra doses.  Where should I keep my medicine?  Keep out of the reach of children. This medicine can be abused. Keep your medicine in a safe place to protect it from theft. Do not share this medicine with anyone. Selling or giving away this medicine  is dangerous and against the law.  This medicine may cause accidental overdose and death if it taken by other adults, children, or pets. Mix any unused medicine with a substance like cat litter or coffee grounds. Then throw the medicine away in a sealed container like a sealed bag or a coffee can with a lid. Do not use the medicine after the expiration date.  Store at room temperature between 15 and 30 degrees C (59 and 86 degrees F).  What should I tell my health care provider before I take this medicine?  They need to know if you have any of these conditions:  · brain tumor  · Crohn's disease, inflammatory bowel disease, or ulcerative colitis  · drug abuse or addiction  · head injury  · heart or circulation problems  · if you often drink alcohol  · kidney disease or problems going to the bathroom  · liver disease  · lung disease, asthma, or breathing problems  · an unusual or allergic reaction to acetaminophen, hydrocodone, other opioid analgesics, other medicines, foods, dyes, or preservatives  · pregnant or trying to get pregnant  · breast-feeding  What should I watch for while using this medicine?  Tell your doctor or health care professional if your pain does not go away, if it gets worse, or if you have new or a different type of pain. You may develop tolerance to the medicine. Tolerance means that you will need a higher dose of the medicine for pain relief. Tolerance is normal and is expected if you take the medicine for a long time.  Do not suddenly stop taking your medicine because you may develop a severe reaction. Your body becomes used to the medicine. This does NOT mean you are addicted. Addiction is a behavior related to getting and using a drug for a non-medical reason. If you have pain, you have a medical reason to take pain medicine. Your doctor will tell you how much medicine to take. If your doctor wants you to stop the medicine, the dose will be slowly lowered over time to avoid any side  effects.  There are different types of narcotic medicines (opiates). If you take more than one type at the same time or if you are taking another medicine that also causes drowsiness, you may have more side effects. Give your health care provider a list of all medicines you use. Your doctor will tell you how much medicine to take. Do not take more medicine than directed. Call emergency for help if you have problems breathing or unusual sleepiness.  Do not take other medicines that contain acetaminophen with this medicine. Always read labels carefully. If you have questions, ask your doctor or pharmacist.  If you take too much acetaminophen get medical help right away. Too much acetaminophen can be very dangerous and cause liver damage. Even if you do not have symptoms, it is important to get help right away.  You may get drowsy or dizzy. Do not drive, use machinery, or do anything that needs mental alertness until you know how this medicine affects you. Do not stand or sit up quickly, especially if you are an older patient. This reduces the risk of dizzy or fainting spells. Alcohol may interfere with the effect of this medicine. Avoid alcoholic drinks.  The medicine will cause constipation. Try to have a bowel movement at least every 2 to 3 days. If you do not have a bowel movement for 3 days, call your doctor or health care professional.  Your mouth may get dry. Chewing sugarless gum or sucking hard candy, and drinking plenty of water may help. Contact your doctor if the problem does not go away or is severe.  Date Last Reviewed:   NOTE:This sheet is a summary. It may not cover all possible information. If you have questions about this medicine, talk to your doctor, pharmacist, or health care provider. Copyright© 2016 [employer]        Discharge Instructions for Laparoscopic Hysterectomy  You had a procedure called laparoscopic hysterectomy. A surgeon removed your uterus using instruments inserted through small  incisions in your abdomen. These incisions may be tender or sore. You may also have pain in your upper back or shoulders. This is from the gas used to enlarge your abdomen to allow your doctor to see inside your pelvis and perform the procedure. This pain usually goes away in a day or two. It usually takes from 1 to 4 weeks to recover from laparoscopic hysterectomy. Remember, though, that recovery time varies from woman to woman. Here's what you can do to speed your recovery following surgery.  Home care   · Continue the coughing and deep breathing exercises that you learned in the hospital.  · Take your medications exactly as directed by your doctor.  · Avoid constipation.  ¨ Eat fruits, vegetables, and whole grains.  ¨ Drink 6 to 8 glasses of water a day, unless told to do otherwise.  ¨ Use a laxative or a mild stool softener if your doctor says it's OK.  · Shower as usual. Wash your incisions with mild soap and water. Pat dry.  · Don't use oils, powders, or lotions on your incisions.  · Don't put anything in your vagina until your doctor says it's safe to do so. Don't use tampons or douches. Don't have sex.  · If you had both ovaries removed, report hot flashes, mood swings, and irritability to your doctor. There may be medications that can help you.  Activity  · Ask your doctor when you can start driving again. It's usually okay to drive as soon as you are free of pain and able to move comfortably from side to side. Don't drive while you are still taking opioid pain medications.  · Ask others to help with chores and errands while you recover.  · Dont lift anything heavier than 10 pounds for 6 weeks.  · Dont vacuum or do other strenuous activities until the doctor says it's OK.  · Walk as often as you feel able.  · Don't drive for a few days after the surgery. You may drive as soon as you are able to move comfortably from side to side and when you are no longer taking narcotics.  · Climb stairs slowly and pause  after every few steps.  Follow-up care  Make a follow-up appointment as directed by our staff.     When to call your doctor  Call your doctor right away if you have any of the following:  · Fever above 100.4°F (38°C) or chills  · Bright red vaginal bleeding or vaginal bleeding that soaks more than one sanitary pad per hour  · A foul smelling discharge from the vagina  · Trouble urinating or burning when you urinate  · Severe pain or bloating in your abdomen  · Redness, swelling, or drainage at your incision sites  · Shortness of breath or chest pain  · Nausea and vomiting   Date Last Reviewed: 5/19/2015  © 2498-5204 [employer]. [address] [place] [address], [place] [address]. All rights reserved. This information is not intended as a substitute for professional medical care. Always follow your healthcare professional's instructions.

## 2017-08-31 NOTE — PLAN OF CARE
Problem: Patient Care Overview  Goal: Plan of Care Review  Outcome: Outcome(s) achieved Date Met: 08/31/17  To be discharged today. Tolerating regular diet. Voiding without difficulty. Denies pain. Ambulating independently.

## 2017-08-31 NOTE — PLAN OF CARE
Problem: Patient Care Overview  Goal: Plan of Care Review  Outcome: Ongoing (interventions implemented as appropriate)  POC reviewed with patient. Indications for medications and possible side effects explained. Pt verbalized understanding. Pt resting comfortably; denies pain. Glucose readings were WDL; no supplemental insulin given. VS stable. Will continue to monitor.

## 2017-09-02 ENCOUNTER — NURSE TRIAGE (OUTPATIENT)
Dept: ADMINISTRATIVE | Facility: CLINIC | Age: 62
End: 2017-09-02

## 2017-09-06 ENCOUNTER — PATIENT MESSAGE (OUTPATIENT)
Dept: OBSTETRICS AND GYNECOLOGY | Facility: HOSPITAL | Age: 62
End: 2017-09-06

## 2017-09-06 NOTE — TELEPHONE ENCOUNTER
Reviewed pathology and surgery.  Patient with no complaints.  Has flatus and normal soft bowel movements (which just started yesterday - was constipated until then), no nausea, vomiting, fever, chest pain, shortness of breath or palpitations. There are no range of movement or sensory problems.

## 2017-09-27 ENCOUNTER — OFFICE VISIT (OUTPATIENT)
Dept: OBSTETRICS AND GYNECOLOGY | Facility: CLINIC | Age: 62
End: 2017-09-27
Payer: MEDICARE

## 2017-09-27 VITALS
HEIGHT: 63 IN | BODY MASS INDEX: 39.34 KG/M2 | SYSTOLIC BLOOD PRESSURE: 126 MMHG | DIASTOLIC BLOOD PRESSURE: 70 MMHG | WEIGHT: 222 LBS

## 2017-09-27 DIAGNOSIS — Z09 POSTOP CHECK: Primary | ICD-10-CM

## 2017-09-27 PROCEDURE — 99999 PR PBB SHADOW E&M-EST. PATIENT-LVL II: CPT | Mod: PBBFAC,,, | Performed by: OBSTETRICS & GYNECOLOGY

## 2017-09-27 PROCEDURE — 99024 POSTOP FOLLOW-UP VISIT: CPT | Mod: S$GLB,,, | Performed by: OBSTETRICS & GYNECOLOGY

## 2017-09-27 NOTE — PROGRESS NOTES
Anupama Norman is a 62 y.o. female  post-op from a  Hysterectomy    OPERATIVE NOTE     2017      PRE-PROCEDURE COUNSELING  Patient counseled on the risks, benefits, and alternatives to procedure.  Please see preoperative consents.                                                    SURGEON:  Amelia Fang M.D.                                                                                                                         ASSISTANT:  SELENA Tamez                                                                                                                           PREOPERATIVE DIAGNOSES:  Pelvic pain, uterine prolapse, rectocele                                                                                                           POSTOPERATIVE DIAGNOSES:  Same, no cystocele (in fact anterior vaginal mucosa was naturally flat with no laxity or prolapse even with valsalva under anesthesia)                                                                                                                         PROCEDURE:    Robotic-assisted laparoscopic hysterectomy, bilateral salpingo-oophorectomy, posterior colporrhaphy, sacrospinous ligament fixation, perineorrhaphy           EBL: 250mL                      FINDINGS: benign appearing pelvic organs, no cystocele       There are no complaints and the procedure and hospitalization occured without complications.     The operative findings and pathology were reviewed with the patient.     PreOperative Diagnosis  Prolapse/pelvic pain.  SPECIMEN  1) Uterus, cervix, bilateral tubes and ovaries.  FINAL PATHOLOGIC DIAGNOSIS  1  Uterus, cervix, bilateral tubes and ovaries:  Uterus (125 g) with weakly proliferative endometrium and areas of cystic atrophy;  Cervix without significant histologic alteration;  Bilateral ovaries and fallopian tubes without significant histologic alteration.  [facility]  Diagnosed by: Keon Metzger  M.D.  (Electronically Signed: 2017-09-06 09:53:16)  Gross Description  ID/AP Label: [id number]  The specimen is received in formalin and consists of a 125 g hysterectomy    PE:  APPEARANCE: Well nourished, well developed, in no acute distress.   ABDOMEN: Soft. No tenderness or masses . No hernias. Incisions intact, clean and dry  PELVIC: vaginal cuff intact no masses or bleeding.  Vaginal incisions healing well - intact, with support retained despite valsalva    Assessment: Routine postoperative follow-up exam      Follow-up with me as needed.  1 year annual.

## 2017-11-27 ENCOUNTER — OFFICE VISIT (OUTPATIENT)
Dept: URGENT CARE | Facility: CLINIC | Age: 62
End: 2017-11-27
Payer: MEDICARE

## 2017-11-27 ENCOUNTER — OFFICE VISIT (OUTPATIENT)
Dept: INTERNAL MEDICINE | Facility: CLINIC | Age: 62
End: 2017-11-27
Payer: MEDICARE

## 2017-11-27 VITALS
SYSTOLIC BLOOD PRESSURE: 120 MMHG | DIASTOLIC BLOOD PRESSURE: 80 MMHG | HEART RATE: 74 BPM | OXYGEN SATURATION: 98 % | WEIGHT: 225.19 LBS | TEMPERATURE: 98 F | HEIGHT: 64 IN | BODY MASS INDEX: 38.44 KG/M2

## 2017-11-27 VITALS
SYSTOLIC BLOOD PRESSURE: 139 MMHG | DIASTOLIC BLOOD PRESSURE: 80 MMHG | WEIGHT: 225 LBS | HEART RATE: 73 BPM | HEIGHT: 63 IN | OXYGEN SATURATION: 97 % | BODY MASS INDEX: 39.87 KG/M2

## 2017-11-27 DIAGNOSIS — J32.9 BACTERIAL SINUSITIS: Primary | ICD-10-CM

## 2017-11-27 DIAGNOSIS — F33.42 RECURRENT MAJOR DEPRESSION IN FULL REMISSION: Chronic | ICD-10-CM

## 2017-11-27 DIAGNOSIS — B96.89 BACTERIAL SINUSITIS: Primary | ICD-10-CM

## 2017-11-27 DIAGNOSIS — Z00.00 ENCOUNTER FOR PREVENTIVE HEALTH EXAMINATION: Primary | ICD-10-CM

## 2017-11-27 DIAGNOSIS — E66.01 SEVERE OBESITY (BMI 35.0-39.9) WITH COMORBIDITY: ICD-10-CM

## 2017-11-27 DIAGNOSIS — Z86.010 HISTORY OF COLON POLYPS: ICD-10-CM

## 2017-11-27 DIAGNOSIS — E11.9 CONTROLLED TYPE 2 DIABETES MELLITUS WITHOUT COMPLICATION, WITHOUT LONG-TERM CURRENT USE OF INSULIN: ICD-10-CM

## 2017-11-27 DIAGNOSIS — E11.69 MIXED DIABETIC HYPERLIPIDEMIA ASSOCIATED WITH TYPE 2 DIABETES MELLITUS: Chronic | ICD-10-CM

## 2017-11-27 DIAGNOSIS — E78.2 MIXED DIABETIC HYPERLIPIDEMIA ASSOCIATED WITH TYPE 2 DIABETES MELLITUS: Chronic | ICD-10-CM

## 2017-11-27 DIAGNOSIS — E03.4 HYPOTHYROIDISM DUE TO ACQUIRED ATROPHY OF THYROID: Chronic | ICD-10-CM

## 2017-11-27 DIAGNOSIS — K21.9 GASTROESOPHAGEAL REFLUX DISEASE, ESOPHAGITIS PRESENCE NOT SPECIFIED: ICD-10-CM

## 2017-11-27 DIAGNOSIS — I10 ESSENTIAL HYPERTENSION: Chronic | ICD-10-CM

## 2017-11-27 PROBLEM — Z01.810 PREOP CARDIOVASCULAR EXAM: Status: RESOLVED | Noted: 2017-06-27 | Resolved: 2017-11-27

## 2017-11-27 PROBLEM — N81.4 UTERINE PROLAPSE: Status: RESOLVED | Noted: 2017-06-24 | Resolved: 2017-11-27

## 2017-11-27 PROBLEM — N83.42: Status: RESOLVED | Noted: 2017-08-30 | Resolved: 2017-11-27

## 2017-11-27 PROBLEM — N81.6 RECTOCELE: Status: RESOLVED | Noted: 2017-06-24 | Resolved: 2017-11-27

## 2017-11-27 PROCEDURE — 99999 PR PBB SHADOW E&M-EST. PATIENT-LVL IV: CPT | Mod: PBBFAC,,, | Performed by: NURSE PRACTITIONER

## 2017-11-27 PROCEDURE — 99214 OFFICE O/P EST MOD 30 MIN: CPT | Mod: S$GLB,,, | Performed by: PHYSICIAN ASSISTANT

## 2017-11-27 PROCEDURE — 99499 UNLISTED E&M SERVICE: CPT | Mod: S$GLB,,, | Performed by: NURSE PRACTITIONER

## 2017-11-27 PROCEDURE — 99999 PR PBB SHADOW E&M-EST. PATIENT-LVL III: CPT | Mod: PBBFAC,,, | Performed by: PHYSICIAN ASSISTANT

## 2017-11-27 PROCEDURE — G0439 PPPS, SUBSEQ VISIT: HCPCS | Mod: S$GLB,,, | Performed by: NURSE PRACTITIONER

## 2017-11-27 RX ORDER — AMOXICILLIN AND CLAVULANATE POTASSIUM 875; 125 MG/1; MG/1
1 TABLET, FILM COATED ORAL 2 TIMES DAILY
Qty: 14 TABLET | Refills: 0 | Status: SHIPPED | OUTPATIENT
Start: 2017-11-27 | End: 2017-12-04

## 2017-11-27 NOTE — PROGRESS NOTES
"Anupama Norman presented for a  Medicare AWV and comprehensive Health Risk Assessment today. The following components were reviewed and updated:    · Medical history  · Family History  · Social history  · Allergies and Current Medications  · Health Risk Assessment  · Health Maintenance  · Care Team     ** See Completed Assessments for Annual Wellness Visit within the encounter summary.**       The following assessments were completed:  · Living Situation  · CAGE  · Depression Screening  · Timed Get Up and Go  · Whisper Test  · Cognitive Function Screening  · Nutrition Screening  · ADL Screening  · PAQ Screening    Vitals:    11/27/17 1243   BP: 139/80   BP Location: Left arm   Patient Position: Sitting   BP Method: X-Large (Manual)   Pulse: 73   SpO2: 97%   Weight: 102 kg (224 lb 15.7 oz)   Height: 5' 3" (1.6 m)     Body mass index is 39.85 kg/m².  Physical Exam   Constitutional: She appears well-developed.   HENT:   Head: Normocephalic and atraumatic.   Eyes: Pupils are equal, round, and reactive to light.   Neck: Carotid bruit is not present.   Cardiovascular: Normal rate, regular rhythm, normal heart sounds, intact distal pulses and normal pulses.  Exam reveals no gallop.    No murmur heard.  Pulmonary/Chest: Effort normal and breath sounds normal.   Abdominal: Soft. Normal appearance and bowel sounds are normal. She exhibits no distension. There is no tenderness.   Musculoskeletal: Normal range of motion. She exhibits no edema or tenderness.   Neurological: She is alert. She exhibits normal muscle tone. Gait normal.   Skin: Skin is warm, dry and intact.   Psychiatric: She has a normal mood and affect. Her speech is normal and behavior is normal. Judgment and thought content normal. Cognition and memory are normal.   Nursing note and vitals reviewed.        Diagnoses and health risks identified today and associated recommendations/orders:    1. Encounter for preventive health examination    2. Recurrent major " depression in full remission  Stable and controlled on Paxil and Trazodone daily. Continue current treatment plan as previously prescribed with your outside psychiatrist- Dr. Cifuentes    3. Severe obesity (BMI 35.0-39.9) with comorbidity  Chronic and improved.  BMI 38.66 today. 25 lb in the last 2 years. Encouraged low carb diet and cardiac exercise as tolerated  Followed by PCP - Dr. Krueger    4. Controlled type 2 diabetes mellitus without complication, without long-term current use of insulin  Component      Latest Ref Rng & Units 7/18/2017   Hemoglobin A1C      4.0 - 5.6 % 5.9 (H)   Stable and controlled on Metformin. Continue current treatment plan as previously prescribed with your PCP.     5. Mixed diabetic hyperlipidemia associated with type 2 diabetes mellitus  Component      Latest Ref Rng & Units 1/31/2017   Cholesterol      120 - 199 mg/dL 159   Triglycerides      30 - 150 mg/dL 126   HDL      40 - 75 mg/dL 41   LDL Cholesterol      63.0 - 159.0 mg/dL 92.8   HDL/Chol Ratio      20.0 - 50.0 % 25.8   Total Cholesterol/HDL Ratio      2.0 - 5.0 3.9   Non-HDL Cholesterol      mg/dL 118   Stable and controlled on Mevacor. Continue current treatment plan as previously prescribed with your PCP.     6. Essential hypertension  Stable and controlled on Maxzide. Continue current treatment plan as previously prescribed with your PCP.     7. Gastroesophageal reflux disease, esophagitis presence not specified  Stable and controlled  On Nexium. Continue current treatment plan as previously prescribed with your PCP.     8. Hypothyroidism due to acquired atrophy of thyroid  Stable and controlled on Synthroid . Continue current treatment plan as previously prescribed with your PCP.     9. History of colon polyps  colonoscopy 4/ 2015 ; Repeat colonoscopy in 5 years for surveillance.. Pt denies pain or bleeding Followed by PCP and gastroenterologist    Mervin Altman with a 5-10 year written screening schedule and personal  prevention plan. Recommendations were developed using the USPSTF age appropriate recommendations. Education, counseling, and referrals were provided as needed. After Visit Summary printed and given to patient which includes a list of additional screenings\tests needed.    Return in about 1 year (around 11/27/2018).    Stephanie Taylor NP

## 2017-11-27 NOTE — PATIENT INSTRUCTIONS
Sinusitis (Antibiotic Treatment)    The sinuses are air-filled spaces within the bones of the face. They connect to the inside of the nose. Sinusitis is an inflammation of the tissue lining the sinus cavity. Sinus inflammation can occur during a cold. It can also be due to allergies to pollens and other particles in the air. Sinusitis can cause symptoms of sinus congestion and fullness. A sinus infection causes fever, headache and facial pain. There is often green or yellow drainage from the nose or into the back of the throat (post-nasal drip). You have been given antibiotics to treat this condition.  Home care:  · Take the full course of antibiotics as instructed. Do not stop taking them, even if you feel better.  · Drink plenty of water, hot tea, and other liquids. This may help thin mucus. It also may promote sinus drainage.  · Heat may help soothe painful areas of the face. Use a towel soaked in hot water. Or,  the shower and direct the hot spray onto your face. Using a vaporizer along with a menthol rub at night may also help.   · An expectorant containing guaifenesin may help thin the mucus and promote drainage from the sinuses.  · Over-the-counter decongestants may be used unless a similar medicine was prescribed. Nasal sprays work the fastest. Use one that contains phenylephrine or oxymetazoline. First blow the nose gently. Then use the spray. Do not use these medicines more often than directed on the label or symptoms may get worse. You may also use tablets containing pseudoephedrine. Avoid products that combine ingredients, because side effects may be increased. Read labels. You can also ask the pharmacist for help. (NOTE: Persons with high blood pressure should not use decongestants. They can raise blood pressure.)  · Over-the-counter antihistamines may help if allergies contributed to your sinusitis.    · Do not use nasal rinses or irrigation during an acute sinus infection, unless told to by  your health care provider. Rinsing may spread the infection to other sinuses.  · Use acetaminophen or ibuprofen to control pain, unless another pain medicine was prescribed. (If you have chronic liver or kidney disease or ever had a stomach ulcer, talk with your doctor before using these medicines. Aspirin should never be used in anyone under 18 years of age who is ill with a fever. It may cause severe liver damage.)  · Don't smoke. This can worsen symptoms.  Follow-up care  Follow up with your healthcare provider or our staff if you are not improving within the next week.  When to seek medical advice  Call your healthcare provider if any of these occur:  · Facial pain or headache becoming more severe  · Stiff neck  · Unusual drowsiness or confusion  · Swelling of the forehead or eyelids  · Vision problems, including blurred or double vision  · Fever of 100.4ºF (38ºC) or higher, or as directed by your healthcare provider  · Seizure  · Breathing problems  · Symptoms not resolving within 10 days  Date Last Reviewed: 4/13/2015  © 3471-6187 [employer]. [address] [place] [address]. All rights reserved. This information is not intended as a substitute for professional medical care. Always follow your healthcare professional's instructions.    Rest.  Use warm compresses on sinuses for relief several times a day.  Increase fluid intake to at least 64 ounces of water per day to keep secretions thin and loose.  Normal saline nasal wash or Flonase to irrigate sinuses and for congestion/runny nose.  Use a cool mist vaporizer or humidifier in home to help relieve congestion.    Practice good handwashing.  Zyrtec or Claritin to help dry mucus and post nasal drip.  Mucinex for cough and chest congestion.  Avoid decongestants (Sudafed,Mucinex-D,Claritin-D, etc) if you have hypertension.  Tylenol or Ibuprofen for fever, headache and body aches.  Warm salt water gargles and lozenges for throat  comfort.  Chloraseptic spray or lozenges for throat comfort.  See PCP if symptoms fail to improve.   Go to ER if you develop fever of 103 or higher, chest pain, shortness of breath, upper back pain, stiff neck or severe headache.

## 2017-11-27 NOTE — PROGRESS NOTES
"Subjective:      Patient ID: Anupama Norman is a 62 y.o. female.    Chief Complaint: Sinus Problem (x3 weeks); Nasal Congestion; and Cough    Sinus Problem   This is a new problem. The current episode started 1 to 4 weeks ago (3wks). The problem is unchanged. Associated symptoms include congestion, coughing (productive), diaphoresis, ear pain (fullness in R ear), headaches (improved), sinus pressure, sneezing and a sore throat. Pertinent negatives include no chills or shortness of breath. Treatments tried: Johana-seltzer, cold and flu capsules.     Review of Systems   Constitutional: Positive for diaphoresis and fever. Negative for chills.   HENT: Positive for congestion, ear pain (fullness in R ear), postnasal drip, rhinorrhea, sinus pain, sinus pressure, sneezing and sore throat.    Respiratory: Positive for cough (productive). Negative for shortness of breath and wheezing.    Gastrointestinal: Positive for nausea. Negative for abdominal pain, constipation, diarrhea and vomiting.   Neurological: Positive for headaches (improved). Negative for dizziness and light-headedness.       Objective:   /80 (BP Location: Left arm, Patient Position: Sitting, BP Method: Large (Manual))   Pulse 74   Temp 98.4 °F (36.9 °C) (Tympanic)   Ht 5' 4" (1.626 m)   Wt 102.2 kg (225 lb 3.2 oz)   SpO2 98%   BMI 38.66 kg/m²   Physical Exam   Constitutional: She appears well-developed and well-nourished. She does not appear ill. No distress.   HENT:   Head: Normocephalic and atraumatic.   Right Ear: Tympanic membrane and ear canal normal. No tenderness. Tympanic membrane is not erythematous. No middle ear effusion.   Left Ear: Tympanic membrane and ear canal normal. No tenderness. Tympanic membrane is not erythematous.  No middle ear effusion.   Nose: Mucosal edema present. Right sinus exhibits maxillary sinus tenderness. Right sinus exhibits no frontal sinus tenderness. Left sinus exhibits maxillary sinus tenderness. Left " sinus exhibits no frontal sinus tenderness.   Mouth/Throat: Uvula is midline and oropharynx is clear and moist.   Cardiovascular: Normal rate, regular rhythm and normal heart sounds.    No murmur heard.  Pulmonary/Chest: Effort normal and breath sounds normal. No respiratory distress. She has no decreased breath sounds. She has no wheezes. She has no rhonchi. She has no rales.   Lymphadenopathy:     She has no cervical adenopathy.   Skin: Skin is warm and dry. No rash noted. She is not diaphoretic.   Psychiatric: She has a normal mood and affect. Her speech is normal and behavior is normal. Thought content normal.     Assessment:      1. Bacterial sinusitis       Plan:   Bacterial sinusitis  -     amoxicillin-clavulanate 875-125mg (AUGMENTIN) 875-125 mg per tablet; Take 1 tablet by mouth 2 (two) times daily.  Dispense: 14 tablet; Refill: 0    Gave handout on sinusitis.  Printed and reviewed AVS.     Further instruction:  Rest.  Use warm compresses on sinuses for relief several times a day.  Increase fluid intake to at least 64 ounces of water per day to keep secretions thin and loose.  Normal saline nasal wash or Flonase to irrigate sinuses and for congestion/runny nose.  Use a cool mist vaporizer or humidifier in home to help relieve congestion.    Practice good handwashing.  Zyrtec or Claritin to help dry mucus and post nasal drip.  Mucinex for cough and chest congestion.  Avoid decongestants (Sudafed,Mucinex-D,Claritin-D, etc) if you have hypertension.  Tylenol or Ibuprofen for fever, headache and body aches.  Warm salt water gargles and lozenges for throat comfort.  Chloraseptic spray or lozenges for throat comfort.  See PCP if symptoms fail to improve.   Go to ER if you develop fever of 103 or higher, chest pain, shortness of breath, upper back pain, stiff neck or severe headache.

## 2017-11-27 NOTE — Clinical Note
Your patient was seen today for a HRA visit. . I have included a copy of my visit note, please review the note and feel free to contact me with any questions.  Thank you for allowing me to participate in the care of your patients.  Stephanie Taylor NP

## 2017-11-27 NOTE — PATIENT INSTRUCTIONS
Counseling and Referral of Other Preventative  (Italic type indicates deductible and co-insurance are waived)    Patient Name: Anupama Norman  Today's Date: 11/27/2017      SERVICE LIMITATIONS RECOMMENDATION    Vaccines    · Pneumococcal (once after 65)    · Influenza (annually)    · Hepatitis B (if medium/high risk)    · Prevnar 13      Hepatitis B medium/high risk factors:       - End-stage renal disease       - Hemophiliacs who received Factor VII or         IX concentrates       - Clients of institutions for the mentally             retarded       - Persons who live in the same house as          a HepB carrier       - Homosexual men       - Illicit injectable drug abusers     Pneumococcal: N/A     Influenza: Recommended to patient, declined     Hepatitis B: N/A     Prevnar 13: N/A    Mammogram (biennial age 50-74)  Annually (age 40 or over)  Done this year, repeat every year    Pap (up to age 70 and after 70 if unknown history or abnormal study last 10 years)    up to date     The USPSTF recommends against screening for cervical cancer in women older than age 65 years who have had adequate prior screening and are not otherwise at high risk for cervical cancer.   and The USPSTF recommends against screening for cervical cancer in women who have had a hysterectomy with removal of the cervix and who do not have a history of a high-grade precancerous lesion (cervical intraepithelial neoplasia [TIFFANY] grade 2 or 3) or cervical cancer.     Colorectal cancer screening (to age 75)    · Fecal occult blood test (annual)  · Flexible sigmoidoscopy (5y)  · Screening colonoscopy (10y)  · Barium enema   4/ 18/ 2020    Diabetes self-management training (no USPSTF recommendations)  Requires referral by treating physician for patient with diabetes or renal disease. 10 hours of initial DSMT sessions of no less than 30 minutes each in a continuous 12-month period. 2 hours of follow-up DSMT in subsequent years.  Recommended to  patient, declined    Bone mass measurements (age 65 & older, biennial)  Requires diagnosis related to osteoporosis or estrogen deficiency. Biennial benefit unless patient has history of long-term glucocorticoid  N/A    Glaucoma screening (no USPSTF recommendation)  Diabetes mellitus, family history   , age 50 or over    American, age 65 or over  Done this year, repeat every year    Medical nutrition therapy for diabetes or renal disease (no recommended schedule)  Requires referral by treating physician for patient with diabetes or renal disease or kidney transplant within the past 3 years.  Can be provided in same year as diabetes self-management training (DSMT), and CMS recommends medical nutrition therapy take place after DSMT. Up to 3 hours for initial year and 2 hours in subsequent years.  Recommended to patient, declined    Cardiovascular screening blood tests (every 5 years)  · Fasting lipid panel  Order as a panel if possible  Done this year, repeat every year    Diabetes screening tests (at least every 3 years, Medicare covers annually or at 6-month intervals for prediabetic patients)  · Fasting blood sugar (FBS) or glucose tolerance test (GTT)  Patient must be diagnosed with one of the following:       - Hypertension       - Dyslipidemia       - Obesity (BMI 30kg/m2)       - Previous elevated impaired FBS or GTT       ... or any two of the following:       - Overweight (BMI 25 but <30)       - Family history of diabetes       - Age 65 or older       - History of gestational diabetes or birth of baby weighing more than 9 pounds  Done this year, repeat every year    HIV screening (annually for increased risk patients)  · HIV-1 and HIV-2 by EIA, or VANESSA, rapid antibody test or oral mucosa transudate  Patients must be at increased risk for HIV infection per USPSTF guidelines or pregnant. Tests covered annually for patient at increased risk or as requested by the patient. Pregnant  patients may receive up to 3 tests during pregnancy.  Risks discussed, screening is not recommended    Smoking cessation counseling (up to 8 sessions per year)  Patients must be asymptomatic of tobacco-related conditions to receive as a preventative service.  Non-smoker    Subsequent annual wellness visit  At least 12 months since last AWV  Return in one year     The following information is provided to all patients.  This information is to help you find resources for any of the problems found today that may be affecting your health:                Living healthy guide: www.[facility].[place].[facility]      Understanding Diabetes: www.diabetes.org      Eating healthy: www.cdc.gov/healthyweight      CDC home safety checklist: www.cdc.gov/steadi/patient.html      Agency on Aging: www.goea.[place].[facility]      Alcoholics anonymous (AA): www.aa.org      Physical Activity: www.anna.nih.gov/vu4aynz      Tobacco use: www.quitwithusla.org

## 2018-01-23 ENCOUNTER — OFFICE VISIT (OUTPATIENT)
Dept: INTERNAL MEDICINE | Facility: CLINIC | Age: 63
End: 2018-01-23
Payer: MEDICARE

## 2018-01-23 ENCOUNTER — LAB VISIT (OUTPATIENT)
Dept: LAB | Facility: HOSPITAL | Age: 63
End: 2018-01-23
Attending: FAMILY MEDICINE
Payer: MEDICARE

## 2018-01-23 VITALS
SYSTOLIC BLOOD PRESSURE: 110 MMHG | BODY MASS INDEX: 37.63 KG/M2 | WEIGHT: 220.44 LBS | HEIGHT: 64 IN | DIASTOLIC BLOOD PRESSURE: 80 MMHG | HEART RATE: 80 BPM | TEMPERATURE: 98 F

## 2018-01-23 DIAGNOSIS — E78.2 MIXED DIABETIC HYPERLIPIDEMIA ASSOCIATED WITH TYPE 2 DIABETES MELLITUS: Chronic | ICD-10-CM

## 2018-01-23 DIAGNOSIS — F33.42 RECURRENT MAJOR DEPRESSION IN FULL REMISSION: Chronic | ICD-10-CM

## 2018-01-23 DIAGNOSIS — E11.69 MIXED DIABETIC HYPERLIPIDEMIA ASSOCIATED WITH TYPE 2 DIABETES MELLITUS: Chronic | ICD-10-CM

## 2018-01-23 DIAGNOSIS — Z00.00 ROUTINE GENERAL MEDICAL EXAMINATION AT A HEALTH CARE FACILITY: ICD-10-CM

## 2018-01-23 DIAGNOSIS — I10 ESSENTIAL HYPERTENSION: Chronic | ICD-10-CM

## 2018-01-23 DIAGNOSIS — Z00.00 ROUTINE GENERAL MEDICAL EXAMINATION AT A HEALTH CARE FACILITY: Primary | ICD-10-CM

## 2018-01-23 DIAGNOSIS — E11.9 CONTROLLED TYPE 2 DIABETES MELLITUS WITHOUT COMPLICATION, WITHOUT LONG-TERM CURRENT USE OF INSULIN: ICD-10-CM

## 2018-01-23 DIAGNOSIS — E03.4 HYPOTHYROIDISM DUE TO ACQUIRED ATROPHY OF THYROID: Chronic | ICD-10-CM

## 2018-01-23 DIAGNOSIS — E66.01 SEVERE OBESITY (BMI 35.0-39.9) WITH COMORBIDITY: ICD-10-CM

## 2018-01-23 DIAGNOSIS — E78.5 DYSLIPIDEMIA: ICD-10-CM

## 2018-01-23 LAB
ALBUMIN SERPL BCP-MCNC: 3.7 G/DL
ALP SERPL-CCNC: 106 U/L
ALT SERPL W/O P-5'-P-CCNC: 23 U/L
ANION GAP SERPL CALC-SCNC: 10 MMOL/L
AST SERPL-CCNC: 23 U/L
BASOPHILS # BLD AUTO: 0.05 K/UL
BASOPHILS NFR BLD: 0.6 %
BILIRUB SERPL-MCNC: 0.5 MG/DL
BUN SERPL-MCNC: 14 MG/DL
CALCIUM SERPL-MCNC: 9.9 MG/DL
CHLORIDE SERPL-SCNC: 101 MMOL/L
CHOLEST SERPL-MCNC: 168 MG/DL
CHOLEST/HDLC SERPL: 3.7 {RATIO}
CO2 SERPL-SCNC: 28 MMOL/L
CREAT SERPL-MCNC: 0.9 MG/DL
CREAT UR-MCNC: 148 MG/DL
DIFFERENTIAL METHOD: NORMAL
EOSINOPHIL # BLD AUTO: 0.3 K/UL
EOSINOPHIL NFR BLD: 4 %
ERYTHROCYTE [DISTWIDTH] IN BLOOD BY AUTOMATED COUNT: 14.5 %
EST. GFR  (AFRICAN AMERICAN): >60 ML/MIN/1.73 M^2
EST. GFR  (NON AFRICAN AMERICAN): >60 ML/MIN/1.73 M^2
ESTIMATED AVG GLUCOSE: 117 MG/DL
GLUCOSE SERPL-MCNC: 101 MG/DL
HBA1C MFR BLD HPLC: 5.7 %
HCT VFR BLD AUTO: 41.9 %
HDLC SERPL-MCNC: 46 MG/DL
HDLC SERPL: 27.4 %
HGB BLD-MCNC: 13.5 G/DL
IMM GRANULOCYTES # BLD AUTO: 0.02 K/UL
IMM GRANULOCYTES NFR BLD AUTO: 0.2 %
LDLC SERPL CALC-MCNC: 101.2 MG/DL
LYMPHOCYTES # BLD AUTO: 2.6 K/UL
LYMPHOCYTES NFR BLD: 31.3 %
MCH RBC QN AUTO: 29.2 PG
MCHC RBC AUTO-ENTMCNC: 32.2 G/DL
MCV RBC AUTO: 91 FL
MICROALBUMIN UR DL<=1MG/L-MCNC: 8 UG/ML
MICROALBUMIN/CREATININE RATIO: 5.4 UG/MG
MONOCYTES # BLD AUTO: 0.6 K/UL
MONOCYTES NFR BLD: 6.9 %
NEUTROPHILS # BLD AUTO: 4.8 K/UL
NEUTROPHILS NFR BLD: 57 %
NONHDLC SERPL-MCNC: 122 MG/DL
NRBC BLD-RTO: 0 /100 WBC
PLATELET # BLD AUTO: 224 K/UL
PMV BLD AUTO: 12.2 FL
POTASSIUM SERPL-SCNC: 4.2 MMOL/L
PROT SERPL-MCNC: 7.9 G/DL
RBC # BLD AUTO: 4.62 M/UL
SODIUM SERPL-SCNC: 139 MMOL/L
T4 FREE SERPL-MCNC: 1.56 NG/DL
TRIGL SERPL-MCNC: 104 MG/DL
TSH SERPL DL<=0.005 MIU/L-ACNC: 0.69 UIU/ML
WBC # BLD AUTO: 8.43 K/UL

## 2018-01-23 PROCEDURE — 99499 UNLISTED E&M SERVICE: CPT | Mod: S$GLB,,, | Performed by: FAMILY MEDICINE

## 2018-01-23 PROCEDURE — 80053 COMPREHEN METABOLIC PANEL: CPT

## 2018-01-23 PROCEDURE — 84439 ASSAY OF FREE THYROXINE: CPT

## 2018-01-23 PROCEDURE — 99999 PR PBB SHADOW E&M-EST. PATIENT-LVL III: CPT | Mod: PBBFAC,,, | Performed by: FAMILY MEDICINE

## 2018-01-23 PROCEDURE — 83036 HEMOGLOBIN GLYCOSYLATED A1C: CPT

## 2018-01-23 PROCEDURE — G0439 PPPS, SUBSEQ VISIT: HCPCS | Mod: S$GLB,,, | Performed by: FAMILY MEDICINE

## 2018-01-23 PROCEDURE — 80061 LIPID PANEL: CPT

## 2018-01-23 PROCEDURE — 99397 PER PM REEVAL EST PAT 65+ YR: CPT | Mod: 25,S$GLB,, | Performed by: FAMILY MEDICINE

## 2018-01-23 PROCEDURE — 36415 COLL VENOUS BLD VENIPUNCTURE: CPT | Mod: PO

## 2018-01-23 PROCEDURE — 84443 ASSAY THYROID STIM HORMONE: CPT

## 2018-01-23 PROCEDURE — 82043 UR ALBUMIN QUANTITATIVE: CPT

## 2018-01-23 PROCEDURE — 85025 COMPLETE CBC W/AUTO DIFF WBC: CPT

## 2018-01-23 RX ORDER — TRIAMTERENE AND HYDROCHLOROTHIAZIDE 75; 50 MG/1; MG/1
TABLET ORAL
Qty: 90 TABLET | Refills: 3 | Status: SHIPPED | OUTPATIENT
Start: 2018-01-23 | End: 2018-04-16 | Stop reason: SDUPTHER

## 2018-01-23 RX ORDER — LANCETS 28 GAUGE
1 EACH MISCELLANEOUS
Qty: 300 EACH | Refills: 3 | Status: SHIPPED | OUTPATIENT
Start: 2018-01-23 | End: 2018-04-16 | Stop reason: SDUPTHER

## 2018-01-23 RX ORDER — INSULIN PUMP SYRINGE, 3 ML
EACH MISCELLANEOUS
Qty: 1 EACH | Refills: 0 | Status: SHIPPED | OUTPATIENT
Start: 2018-01-23 | End: 2018-04-16 | Stop reason: SDUPTHER

## 2018-01-23 RX ORDER — METFORMIN HYDROCHLORIDE 500 MG/1
TABLET, EXTENDED RELEASE ORAL
Qty: 360 TABLET | Refills: 3 | Status: SHIPPED | OUTPATIENT
Start: 2018-01-23 | End: 2018-04-16 | Stop reason: SDUPTHER

## 2018-01-23 RX ORDER — LOVASTATIN 40 MG/1
40 TABLET ORAL NIGHTLY
Qty: 90 TABLET | Refills: 3 | Status: SHIPPED | OUTPATIENT
Start: 2018-01-23 | End: 2018-04-16 | Stop reason: SDUPTHER

## 2018-01-23 RX ORDER — LEVOTHYROXINE SODIUM 150 UG/1
TABLET ORAL
Qty: 90 TABLET | Refills: 3 | Status: SHIPPED | OUTPATIENT
Start: 2018-01-23 | End: 2018-04-16 | Stop reason: SDUPTHER

## 2018-01-23 NOTE — PROGRESS NOTES
"Anupama Norman presented for a follow-up Medicare AWV today. The following components were reviewed and updated:    · Medical history  · Family History  · Social history  · Allergies and Current Medications  · Health Maintenance  Patient Care Team:  Kim Krueger MD as PCP - General (Family Medicine)  · Daryl Maxwell III, MD as Consulting Physician (Gastroenterology)   · Dr Cifuentes- mental health  · Dr Fang - GYN    **See Completed Assessments for Annual Wellness visit with in the encounter summary    · Medicare wellness assessment:  ·   Depression screening  · 1. In the past 2 weeks she has the patient felt down, depressed or hopeless? No  · 2. Over the past 2 weeks as the patient felt little interest or pleasure in doing things?  No  ·  Functional Ability/ Safety Screening  · 1. Was the  Patient's timed up and go test unsteady or longer than 30 seconds?  No   · 2. Has the patient needed help with transportation shopping preparing meals housework laundry medications are managing money?  No  · 3.  If the patient's home have rugs in the hallway, back grab bars in the bathroom, or poor lighting? No, not necessary per patient.   · 4.has there been any hearing difficulties?  No  · Advance Directives:  · 1. Patient does not have a living will in place. Kids know her wishes.     Vitals:    01/23/18 1139   BP: 110/80   Pulse: 80   Temp: 97.6 °F (36.4 °C)   Weight: 100 kg (220 lb 7.4 oz)   Height: 5' 4" (1.626 m)     Body mass index is 37.84 kg/m².   ]      Medicare Annual Wellness Visit, Subsequent   Patient Active Problem List   Diagnosis    Recurrent major depression in full remission    Essential hypertension    GERD (gastroesophageal reflux disease)    Mixed diabetic hyperlipidemia associated with type 2 diabetes mellitus    Hypothyroidism due to acquired atrophy of thyroid    Severe obesity (BMI 35.0-39.9) with comorbidity    History of colon polyps    Controlled type 2 diabetes mellitus "         Provided Anupama with a 5-10 year written screening schedule and personal prevention plan. Recommendations were developed using the USPSTF age appropriate recommendations. Education, counseling, and referrals were provided as needed.  After Visit Summary printed and given to patient which includes a list of additional screenings\tests needed.    Health Maintenance   Topic Date Due    Hemoglobin A1c  01/18/2018    Urine Microalbumin  02/02/2018    Lipid Panel  01/31/2018    Eye Exam  07/06/2018    Mammogram  08/03/2018    Foot Exam  01/23/2019    Colonoscopy  04/08/2020    TETANUS VACCINE  07/18/2027    Hepatitis C Screening  Completed    Zoster Vaccine  Addressed    Influenza Vaccine  Addressed    Pneumococcal PPSV23 (Medium Risk)  Excluded   pt declines all vaccines all except tetanus. Continue with weight loss.     Follow-up for physical with Dr NUNES.      Kim Nunes MD

## 2018-04-16 DIAGNOSIS — E78.5 DYSLIPIDEMIA: ICD-10-CM

## 2018-04-16 RX ORDER — TRIAMTERENE AND HYDROCHLOROTHIAZIDE 75; 50 MG/1; MG/1
TABLET ORAL
Qty: 90 TABLET | Refills: 3 | Status: SHIPPED | OUTPATIENT
Start: 2018-04-16 | End: 2019-01-15 | Stop reason: SDUPTHER

## 2018-04-16 RX ORDER — LOVASTATIN 40 MG/1
40 TABLET ORAL NIGHTLY
Qty: 90 TABLET | Refills: 3 | Status: SHIPPED | OUTPATIENT
Start: 2018-04-16 | End: 2019-01-15 | Stop reason: SDUPTHER

## 2018-04-16 RX ORDER — METFORMIN HYDROCHLORIDE 500 MG/1
TABLET, EXTENDED RELEASE ORAL
Qty: 360 TABLET | Refills: 3 | Status: SHIPPED | OUTPATIENT
Start: 2018-04-16 | End: 2018-12-20 | Stop reason: SDUPTHER

## 2018-04-16 RX ORDER — INSULIN PUMP SYRINGE, 3 ML
EACH MISCELLANEOUS
Qty: 1 EACH | Refills: 0 | Status: SHIPPED | OUTPATIENT
Start: 2018-04-16 | End: 2023-04-25 | Stop reason: SDUPTHER

## 2018-04-16 RX ORDER — LEVOTHYROXINE SODIUM 150 UG/1
TABLET ORAL
Qty: 90 TABLET | Refills: 3 | Status: SHIPPED | OUTPATIENT
Start: 2018-04-16 | End: 2019-01-15 | Stop reason: SDUPTHER

## 2018-04-16 RX ORDER — TRAZODONE HYDROCHLORIDE 50 MG/1
50 TABLET ORAL NIGHTLY
Qty: 90 TABLET | Refills: 3 | Status: SHIPPED | OUTPATIENT
Start: 2018-04-16 | End: 2022-02-14 | Stop reason: SDUPTHER

## 2018-04-16 RX ORDER — LANCETS 28 GAUGE
1 EACH MISCELLANEOUS
Qty: 300 EACH | Refills: 3 | Status: SHIPPED | OUTPATIENT
Start: 2018-04-16 | End: 2023-04-20 | Stop reason: SDUPTHER

## 2018-04-16 NOTE — TELEPHONE ENCOUNTER
----- Message from Claribel Meyers sent at 4/16/2018 12:13 PM CDT -----  Contact: Pt  She is calling in regards to wanting to have a prescription called in that was prescribed back in Jan or Feb.  She stated that it was a renewal of her medication and would like it faxed over to [facility]    She stated that the prescription was not received by the pharmacy.    She would like a callback at .[phone] ([place])       .  [facility] - [place], [place] - [address]  [address] [address]  Phone: [phone] Fax: [phone]

## 2018-06-27 ENCOUNTER — PES CALL (OUTPATIENT)
Dept: ADMINISTRATIVE | Facility: CLINIC | Age: 63
End: 2018-06-27

## 2018-08-17 DIAGNOSIS — E11.9 TYPE 2 DIABETES MELLITUS WITHOUT COMPLICATION: ICD-10-CM

## 2018-08-20 ENCOUNTER — PES CALL (OUTPATIENT)
Dept: ADMINISTRATIVE | Facility: CLINIC | Age: 63
End: 2018-08-20

## 2018-08-22 NOTE — PROGRESS NOTES
Start Amlodipine 5 mg daily    Please  a blood pressure cuff ([employer] brand) and keep a log of your blood pressure at random times during the day.     Call us in 2 weeks with an update on those readings.     Otherwise, follow up in 1 year.    Subjective:      Patient ID: Anupama Norman is a 62 y.o. female.    Chief Complaint: Annual Exam    Disclaimer:  This note is prepared using voice recognition software and as such is likely to have errors and has not been proof read. Please contact me for questions.     Patient is coming in today for wellness exam and followup of chronic issues. Wt is down 26lbs in last 1 year. Eating more veggies.     Did do  GYN surgery for rectal prolapse and PAWEL. She's been doing quite well.  No prior dvts/PEs. No cp. No sob.     She now has diabetes.  She's on metformin now. Needing to do labs today. Working on weight loss.      She's been trying to exercise more.  She does not want see a dietitian. declines all vaccines.     She has hypothyroidism which is currently controlled 175 µg.  Labs are ordered for today. Doing well with meds. Working on weight loss.     Hyperlipidemia currently on a statin therapy.  Needing labs today. Did do coffee this am.     Depressions currently controlled followed by Dr. Cifuentes at the mental health clinic in [place].  She did  and  her . On paxil at 60mg and trazadone 50mg at night.     Hypertension is controlled with a diuretic. Stable.                  Lab Results   Component Value Date    WBC 7.49 08/23/2017    HGB 13.6 08/23/2017    HCT 40.1 08/23/2017     08/23/2017    CHOL 159 01/31/2017    TRIG 126 01/31/2017    HDL 41 01/31/2017    ALT 16 07/18/2017    AST 19 07/18/2017     08/23/2017    K 3.9 08/23/2017     08/23/2017    CREATININE 0.9 08/23/2017    BUN 10 08/23/2017    CO2 29 08/23/2017    TSH 0.189 (L) 07/18/2017    HGBA1C 5.9 (H) 07/18/2017       Review of Systems   Constitutional: Negative for activity change and unexpected weight change.   HENT: Negative for hearing loss, rhinorrhea and trouble swallowing.    Eyes: Negative for discharge and visual disturbance.   Respiratory: Negative for chest tightness and wheezing.   "  Cardiovascular: Negative for chest pain and palpitations.   Gastrointestinal: Negative for blood in stool, constipation, diarrhea and vomiting.   Endocrine: Negative for polydipsia and polyuria.   Genitourinary: Negative for difficulty urinating, dysuria, hematuria and menstrual problem.   Musculoskeletal: Negative for arthralgias, joint swelling and neck pain.   Neurological: Negative for weakness and headaches.   Psychiatric/Behavioral: Negative for confusion and dysphoric mood.     Objective:     Vitals:    01/23/18 1139   BP: 110/80   Pulse: 80   Temp: 97.6 °F (36.4 °C)   Weight: 100 kg (220 lb 7.4 oz)   Height: 5' 4" (1.626 m)     Physical Exam   Constitutional: She appears well-developed and well-nourished.   MO WF   HENT:   Head: Normocephalic and atraumatic.   Right Ear: Tympanic membrane normal.   Left Ear: Tympanic membrane normal.   Mouth/Throat: Oropharynx is clear and moist.   Eyes: Conjunctivae and EOM are normal.   Neck: Normal range of motion. Neck supple.   Cardiovascular: Normal rate and regular rhythm.    Pulses:       Dorsalis pedis pulses are 2+ on the right side, and 2+ on the left side.   Pulmonary/Chest: Effort normal and breath sounds normal.   Musculoskeletal:        Right foot: There is normal range of motion and no deformity.        Left foot: There is normal range of motion and no deformity.   Feet:   Right Foot:   Protective Sensation: 4 sites tested. 4 sites sensed.   Skin Integrity: Positive for warmth. Negative for ulcer, blister, skin breakdown, erythema, callus or dry skin.   Left Foot:   Protective Sensation: 4 sites tested. 4 sites sensed.   Skin Integrity: Positive for warmth. Negative for ulcer, blister, skin breakdown, erythema, callus or dry skin.   Psychiatric: She has a normal mood and affect. Her behavior is normal.   Nursing note and vitals reviewed.    Assessment:     1. Routine general medical examination at a health care facility    2. Controlled type 2 diabetes " mellitus without complication, without long-term current use of insulin    3. Essential hypertension    4. Hypothyroidism due to acquired atrophy of thyroid    5. Mixed diabetic hyperlipidemia associated with type 2 diabetes mellitus    6. Severe obesity (BMI 35.0-39.9) with comorbidity    7. Recurrent major depression in full remission    8. Dyslipidemia      Plan:   Anupama was seen today for annual exam.    Diagnoses and all orders for this visit:    Routine general medical examination at a health care facility- labs today. Declines vaccines.   -     TSH; Future  -     T4, free; Future  -     Lipid panel; Future  -     Hemoglobin A1c; Future  -     Microalbumin/creatinine urine ratio  -     Comprehensive metabolic panel; Future  -     CBC auto differential; Future    Controlled type 2 diabetes mellitus without complication, without long-term current use of insulin  Comments:  labs today, on metformin. stable.   Orders:  -     TSH; Future  -     T4, free; Future  -     Lipid panel; Future  -     Hemoglobin A1c; Future  -     Microalbumin/creatinine urine ratio  -     Comprehensive metabolic panel; Future  -     CBC auto differential; Future    Essential hypertension  Comments:  stable with meds,labs today.   Orders:  -     TSH; Future  -     T4, free; Future  -     Lipid panel; Future  -     Hemoglobin A1c; Future  -     Microalbumin/creatinine urine ratio  -     Comprehensive metabolic panel; Future  -     CBC auto differential; Future    Hypothyroidism due to acquired atrophy of thyroid  Comments:  stable with meds,labs today.   Orders:  -     TSH; Future  -     T4, free; Future  -     Lipid panel; Future  -     Hemoglobin A1c; Future  -     Microalbumin/creatinine urine ratio  -     Comprehensive metabolic panel; Future  -     CBC auto differential; Future    Mixed diabetic hyperlipidemia associated with type 2 diabetes mellitus  Comments:  stable with meds,labs today. Refilled meds. Work on weight loss further.    Orders:  -     TSH; Future  -     T4, free; Future  -     Lipid panel; Future  -     Hemoglobin A1c; Future  -     Microalbumin/creatinine urine ratio  -     Comprehensive metabolic panel; Future  -     CBC auto differential; Future    Severe obesity (BMI 35.0-39.9) with comorbidity- down 26lbs. Cont with weight loss.   -     TSH; Future  -     T4, free; Future  -     Lipid panel; Future  -     Hemoglobin A1c; Future  -     Microalbumin/creatinine urine ratio  -     Comprehensive metabolic panel; Future  -     CBC auto differential; Future    Recurrent major depression in full remission  Comments:  meds through mental health of paxil and trazadone.   Orders:  -     TSH; Future  -     T4, free; Future  -     Lipid panel; Future  -     Hemoglobin A1c; Future  -     Microalbumin/creatinine urine ratio  -     Comprehensive metabolic panel; Future  -     CBC auto differential; Future      Other orders  -     levothyroxine (SYNTHROID) 150 MCG tablet; TAKE 1 TABLET  BEFORE BREAKFAST.  -     metFORMIN (GLUCOPHAGE-XR) 500 MG 24 hr tablet; 4 tab po daily  -     triamterene-hydrochlorothiazide 75-50 mg (MAXZIDE) 75-50 mg per tablet; TAKE 1 TABLET ONE TIME DAILY  -     blood sugar diagnostic (TRUETEST TEST STRIPS) Strp; 1 strip by Misc.(Non-Drug; Combo Route) route 4 (four) times daily with meals and nightly. [employer] True Metrix Test Strips  -     lancets (TRUEPLUS LANCETS) 28 gauge Misc; 1 lancet by Misc.(Non-Drug; Combo Route) route 4 (four) times daily with meals and nightly. [employer] brand  -     blood-glucose meter (TRUE METRIX AIR GLUCOSE METER) kit; USE AS DIRECTED            Follow-up for physical with Dr NUNES.

## 2018-10-19 ENCOUNTER — LAB VISIT (OUTPATIENT)
Dept: LAB | Facility: HOSPITAL | Age: 63
End: 2018-10-19
Attending: PHYSICIAN ASSISTANT
Payer: MEDICARE

## 2018-10-19 ENCOUNTER — OFFICE VISIT (OUTPATIENT)
Dept: INTERNAL MEDICINE | Facility: CLINIC | Age: 63
End: 2018-10-19
Payer: MEDICARE

## 2018-10-19 VITALS
SYSTOLIC BLOOD PRESSURE: 128 MMHG | HEART RATE: 76 BPM | DIASTOLIC BLOOD PRESSURE: 82 MMHG | BODY MASS INDEX: 38.58 KG/M2 | TEMPERATURE: 98 F | HEIGHT: 64 IN | RESPIRATION RATE: 16 BRPM | WEIGHT: 226 LBS

## 2018-10-19 DIAGNOSIS — E11.9 TYPE 2 DIABETES MELLITUS WITHOUT COMPLICATION: ICD-10-CM

## 2018-10-19 DIAGNOSIS — L94.2: Primary | ICD-10-CM

## 2018-10-19 LAB
ESTIMATED AVG GLUCOSE: 108 MG/DL
HBA1C MFR BLD HPLC: 5.4 %

## 2018-10-19 PROCEDURE — 83036 HEMOGLOBIN GLYCOSYLATED A1C: CPT

## 2018-10-19 PROCEDURE — 99999 PR PBB SHADOW E&M-EST. PATIENT-LVL IV: CPT | Mod: PBBFAC,,, | Performed by: NURSE PRACTITIONER

## 2018-10-19 PROCEDURE — 99213 OFFICE O/P EST LOW 20 MIN: CPT | Mod: S$PBB,,, | Performed by: NURSE PRACTITIONER

## 2018-10-19 PROCEDURE — 3008F BODY MASS INDEX DOCD: CPT | Mod: CPTII,,, | Performed by: NURSE PRACTITIONER

## 2018-10-19 PROCEDURE — 3079F DIAST BP 80-89 MM HG: CPT | Mod: CPTII,,, | Performed by: NURSE PRACTITIONER

## 2018-10-19 PROCEDURE — 3074F SYST BP LT 130 MM HG: CPT | Mod: CPTII,,, | Performed by: NURSE PRACTITIONER

## 2018-10-19 PROCEDURE — 36415 COLL VENOUS BLD VENIPUNCTURE: CPT | Mod: PO

## 2018-10-19 PROCEDURE — 99214 OFFICE O/P EST MOD 30 MIN: CPT | Mod: PBBFAC,PO | Performed by: NURSE PRACTITIONER

## 2018-10-19 NOTE — PROGRESS NOTES
"Subjective:       Patient ID: Anupama Norman is a 63 y.o. female.    Chief Complaint: Leg Pain    Patient comes in today with concerned of round bb like objects to the lower extremity. There are 3 to the right lower leg and 1 on the left lower leg. They have been present for about a year. She has occasional leg pain. No calf pain, redness, warmth tenderness. No fevers.         /82 (BP Location: Left arm, Patient Position: Sitting, BP Method: Medium (Automatic))   Pulse 76   Temp 98.2 °F (36.8 °C) (Tympanic)   Resp 16   Ht 5' 4" (1.626 m)   Wt 102.5 kg (225 lb 15.5 oz)   BMI 38.79 kg/m²     Review of Systems   Constitutional: Negative for activity change, appetite change, chills, diaphoresis, fatigue, fever and unexpected weight change.   HENT: Negative.  Negative for hearing loss, rhinorrhea and trouble swallowing.    Eyes: Negative for discharge and visual disturbance.   Respiratory: Negative for cough, chest tightness, shortness of breath and wheezing.    Cardiovascular: Negative for chest pain, palpitations and leg swelling.   Gastrointestinal: Negative.  Negative for blood in stool, constipation, diarrhea and vomiting.   Endocrine: Negative for polydipsia and polyuria.   Genitourinary: Negative.  Negative for difficulty urinating, dysuria and hematuria.   Musculoskeletal: Negative.  Negative for arthralgias, joint swelling and neck pain.   Skin: Negative for color change, pallor, rash and wound.   Allergic/Immunologic: Negative for immunocompromised state.   Neurological: Negative.  Negative for dizziness, facial asymmetry, weakness and headaches.   Hematological: Negative for adenopathy. Does not bruise/bleed easily.   Psychiatric/Behavioral: Negative for agitation, behavioral problems, confusion and dysphoric mood.       Objective:      Physical Exam   Constitutional: She appears well-developed and well-nourished. She is cooperative. No distress.   HENT:   Head: Normocephalic and atraumatic. "   Cardiovascular: Normal rate.   Pulmonary/Chest: Effort normal. No respiratory distress.   Musculoskeletal: Normal range of motion.   Neurological: She is alert.   Skin: Skin is warm and dry. No rash noted. She is not diaphoretic.        Pea sized superficial mobile calcifications x 4 to anterior aspect lower extremities per drawing. No redness, no pain. No calf redness, tenderness, warmth. Negative homans.    Psychiatric: She has a normal mood and affect. Her behavior is normal. Judgment and thought content normal.   Nursing note and vitals reviewed.      Assessment:       1. Calcification, subcutaneous        Plan:       Anupama was seen today for leg pain.    Diagnoses and all orders for this visit:    Calcification, subcutaneous    discussed with Dr. Sands, will monitor, if not improving, will send to general surgeon, reassurance given. No sign of infection, not causing pain. Will monitor  Follow up if worse

## 2018-11-05 ENCOUNTER — PES CALL (OUTPATIENT)
Dept: ADMINISTRATIVE | Facility: CLINIC | Age: 63
End: 2018-11-05

## 2018-11-27 ENCOUNTER — PATIENT OUTREACH (OUTPATIENT)
Dept: ADMINISTRATIVE | Facility: HOSPITAL | Age: 63
End: 2018-11-27

## 2018-11-27 NOTE — PROGRESS NOTES
I have attempted to contact patient  with out success to schedule dm eye exam. Left voice mail.

## 2018-12-20 RX ORDER — METFORMIN HYDROCHLORIDE 500 MG/1
TABLET, EXTENDED RELEASE ORAL
Qty: 360 TABLET | Refills: 3 | Status: SHIPPED | OUTPATIENT
Start: 2018-12-20 | End: 2019-01-15 | Stop reason: SDUPTHER

## 2019-01-15 ENCOUNTER — LAB VISIT (OUTPATIENT)
Dept: LAB | Facility: HOSPITAL | Age: 64
End: 2019-01-15
Attending: FAMILY MEDICINE
Payer: MEDICARE

## 2019-01-15 ENCOUNTER — OFFICE VISIT (OUTPATIENT)
Dept: INTERNAL MEDICINE | Facility: CLINIC | Age: 64
End: 2019-01-15
Payer: MEDICARE

## 2019-01-15 VITALS
BODY MASS INDEX: 38.24 KG/M2 | HEART RATE: 68 BPM | HEIGHT: 64 IN | WEIGHT: 224 LBS | TEMPERATURE: 98 F | SYSTOLIC BLOOD PRESSURE: 112 MMHG | DIASTOLIC BLOOD PRESSURE: 88 MMHG

## 2019-01-15 DIAGNOSIS — E11.69 MIXED DIABETIC HYPERLIPIDEMIA ASSOCIATED WITH TYPE 2 DIABETES MELLITUS: Chronic | ICD-10-CM

## 2019-01-15 DIAGNOSIS — E66.01 SEVERE OBESITY (BMI 35.0-39.9) WITH COMORBIDITY: ICD-10-CM

## 2019-01-15 DIAGNOSIS — E55.9 VITAMIN D DEFICIENCY: ICD-10-CM

## 2019-01-15 DIAGNOSIS — F33.42 RECURRENT MAJOR DEPRESSION IN FULL REMISSION: Chronic | ICD-10-CM

## 2019-01-15 DIAGNOSIS — E03.4 HYPOTHYROIDISM DUE TO ACQUIRED ATROPHY OF THYROID: Chronic | ICD-10-CM

## 2019-01-15 DIAGNOSIS — E78.5 DYSLIPIDEMIA: ICD-10-CM

## 2019-01-15 DIAGNOSIS — I10 ESSENTIAL HYPERTENSION: Chronic | ICD-10-CM

## 2019-01-15 DIAGNOSIS — K21.9 GASTROESOPHAGEAL REFLUX DISEASE, ESOPHAGITIS PRESENCE NOT SPECIFIED: ICD-10-CM

## 2019-01-15 DIAGNOSIS — Z00.00 ROUTINE GENERAL MEDICAL EXAMINATION AT A HEALTH CARE FACILITY: Primary | ICD-10-CM

## 2019-01-15 DIAGNOSIS — E78.2 MIXED DIABETIC HYPERLIPIDEMIA ASSOCIATED WITH TYPE 2 DIABETES MELLITUS: Chronic | ICD-10-CM

## 2019-01-15 DIAGNOSIS — Z00.00 ROUTINE GENERAL MEDICAL EXAMINATION AT A HEALTH CARE FACILITY: ICD-10-CM

## 2019-01-15 DIAGNOSIS — Z12.31 BREAST CANCER SCREENING BY MAMMOGRAM: ICD-10-CM

## 2019-01-15 DIAGNOSIS — E11.9 CONTROLLED TYPE 2 DIABETES MELLITUS WITHOUT COMPLICATION, WITHOUT LONG-TERM CURRENT USE OF INSULIN: ICD-10-CM

## 2019-01-15 LAB
25(OH)D3+25(OH)D2 SERPL-MCNC: 15 NG/ML
ALBUMIN SERPL BCP-MCNC: 3.7 G/DL
ALBUMIN/CREAT UR: 8.4 UG/MG
ALP SERPL-CCNC: 102 U/L
ALT SERPL W/O P-5'-P-CCNC: 19 U/L
ANION GAP SERPL CALC-SCNC: 8 MMOL/L
AST SERPL-CCNC: 22 U/L
BASOPHILS # BLD AUTO: 0.06 K/UL
BASOPHILS NFR BLD: 0.8 %
BILIRUB SERPL-MCNC: 0.5 MG/DL
BUN SERPL-MCNC: 14 MG/DL
CALCIUM SERPL-MCNC: 9.7 MG/DL
CHLORIDE SERPL-SCNC: 98 MMOL/L
CHOLEST SERPL-MCNC: 157 MG/DL
CHOLEST/HDLC SERPL: 3.1 {RATIO}
CO2 SERPL-SCNC: 30 MMOL/L
CREAT SERPL-MCNC: 0.9 MG/DL
CREAT UR-MCNC: 131 MG/DL
DIFFERENTIAL METHOD: ABNORMAL
EOSINOPHIL # BLD AUTO: 0.3 K/UL
EOSINOPHIL NFR BLD: 3.4 %
ERYTHROCYTE [DISTWIDTH] IN BLOOD BY AUTOMATED COUNT: 14.2 %
EST. GFR  (AFRICAN AMERICAN): >60 ML/MIN/1.73 M^2
EST. GFR  (NON AFRICAN AMERICAN): >60 ML/MIN/1.73 M^2
GLUCOSE SERPL-MCNC: 93 MG/DL
HCT VFR BLD AUTO: 43.4 %
HDLC SERPL-MCNC: 51 MG/DL
HDLC SERPL: 32.5 %
HGB BLD-MCNC: 13.7 G/DL
IMM GRANULOCYTES # BLD AUTO: 0.03 K/UL
IMM GRANULOCYTES NFR BLD AUTO: 0.4 %
INSULIN COLLECTION INTERVAL: NORMAL
INSULIN SERPL-ACNC: 9.4 UU/ML
LDLC SERPL CALC-MCNC: 88.8 MG/DL
LYMPHOCYTES # BLD AUTO: 2.7 K/UL
LYMPHOCYTES NFR BLD: 35.7 %
MCH RBC QN AUTO: 29.9 PG
MCHC RBC AUTO-ENTMCNC: 31.6 G/DL
MCV RBC AUTO: 95 FL
MICROALBUMIN UR DL<=1MG/L-MCNC: 11 UG/ML
MONOCYTES # BLD AUTO: 0.5 K/UL
MONOCYTES NFR BLD: 6.3 %
NEUTROPHILS # BLD AUTO: 4.1 K/UL
NEUTROPHILS NFR BLD: 53.4 %
NONHDLC SERPL-MCNC: 106 MG/DL
NRBC BLD-RTO: 0 /100 WBC
PLATELET # BLD AUTO: 189 K/UL
PMV BLD AUTO: 11.8 FL
POTASSIUM SERPL-SCNC: 4.2 MMOL/L
PROT SERPL-MCNC: 7.6 G/DL
RBC # BLD AUTO: 4.58 M/UL
SODIUM SERPL-SCNC: 136 MMOL/L
T4 FREE SERPL-MCNC: 1.52 NG/DL
TRIGL SERPL-MCNC: 86 MG/DL
TSH SERPL DL<=0.005 MIU/L-ACNC: 0.56 UIU/ML
WBC # BLD AUTO: 7.64 K/UL

## 2019-01-15 PROCEDURE — 3044F HG A1C LEVEL LT 7.0%: CPT | Mod: CPTII,S$GLB,, | Performed by: FAMILY MEDICINE

## 2019-01-15 PROCEDURE — 3044F PR MOST RECENT HEMOGLOBIN A1C LEVEL <7.0%: ICD-10-PCS | Mod: CPTII,S$GLB,, | Performed by: FAMILY MEDICINE

## 2019-01-15 PROCEDURE — 84443 ASSAY THYROID STIM HORMONE: CPT

## 2019-01-15 PROCEDURE — 3079F DIAST BP 80-89 MM HG: CPT | Mod: CPTII,S$GLB,, | Performed by: FAMILY MEDICINE

## 2019-01-15 PROCEDURE — 99499 RISK ADDL DX/OHS AUDIT: ICD-10-PCS | Mod: S$GLB,,, | Performed by: FAMILY MEDICINE

## 2019-01-15 PROCEDURE — 82306 VITAMIN D 25 HYDROXY: CPT

## 2019-01-15 PROCEDURE — 99396 PREV VISIT EST AGE 40-64: CPT | Mod: S$GLB,,, | Performed by: FAMILY MEDICINE

## 2019-01-15 PROCEDURE — 3074F SYST BP LT 130 MM HG: CPT | Mod: CPTII,S$GLB,, | Performed by: FAMILY MEDICINE

## 2019-01-15 PROCEDURE — 99396 PR PREVENTIVE VISIT,EST,40-64: ICD-10-PCS | Mod: S$GLB,,, | Performed by: FAMILY MEDICINE

## 2019-01-15 PROCEDURE — 3074F PR MOST RECENT SYSTOLIC BLOOD PRESSURE < 130 MM HG: ICD-10-PCS | Mod: CPTII,S$GLB,, | Performed by: FAMILY MEDICINE

## 2019-01-15 PROCEDURE — 36415 COLL VENOUS BLD VENIPUNCTURE: CPT | Mod: PO

## 2019-01-15 PROCEDURE — 3079F PR MOST RECENT DIASTOLIC BLOOD PRESSURE 80-89 MM HG: ICD-10-PCS | Mod: CPTII,S$GLB,, | Performed by: FAMILY MEDICINE

## 2019-01-15 PROCEDURE — 85025 COMPLETE CBC W/AUTO DIFF WBC: CPT

## 2019-01-15 PROCEDURE — 80061 LIPID PANEL: CPT

## 2019-01-15 PROCEDURE — 99999 PR PBB SHADOW E&M-EST. PATIENT-LVL III: ICD-10-PCS | Mod: PBBFAC,,, | Performed by: FAMILY MEDICINE

## 2019-01-15 PROCEDURE — 80053 COMPREHEN METABOLIC PANEL: CPT

## 2019-01-15 PROCEDURE — 99999 PR PBB SHADOW E&M-EST. PATIENT-LVL III: CPT | Mod: PBBFAC,,, | Performed by: FAMILY MEDICINE

## 2019-01-15 PROCEDURE — 83036 HEMOGLOBIN GLYCOSYLATED A1C: CPT

## 2019-01-15 PROCEDURE — 99499 UNLISTED E&M SERVICE: CPT | Mod: S$GLB,,, | Performed by: FAMILY MEDICINE

## 2019-01-15 PROCEDURE — 82043 UR ALBUMIN QUANTITATIVE: CPT

## 2019-01-15 PROCEDURE — 83525 ASSAY OF INSULIN: CPT

## 2019-01-15 PROCEDURE — 84439 ASSAY OF FREE THYROXINE: CPT

## 2019-01-15 RX ORDER — TRIAMTERENE AND HYDROCHLOROTHIAZIDE 75; 50 MG/1; MG/1
TABLET ORAL
Qty: 90 TABLET | Refills: 3 | Status: SHIPPED | OUTPATIENT
Start: 2019-01-15 | End: 2020-02-17 | Stop reason: SDUPTHER

## 2019-01-15 RX ORDER — ESOMEPRAZOLE MAGNESIUM 40 MG/1
40 CAPSULE, DELAYED RELEASE ORAL
Qty: 90 CAPSULE | Refills: 3 | Status: SHIPPED | OUTPATIENT
Start: 2019-01-15 | End: 2020-02-17

## 2019-01-15 RX ORDER — METFORMIN HYDROCHLORIDE 500 MG/1
TABLET, EXTENDED RELEASE ORAL
Qty: 360 TABLET | Refills: 3 | Status: SHIPPED | OUTPATIENT
Start: 2019-01-15 | End: 2020-02-17 | Stop reason: SDUPTHER

## 2019-01-15 RX ORDER — LEVOTHYROXINE SODIUM 150 UG/1
TABLET ORAL
Qty: 90 TABLET | Refills: 3 | Status: SHIPPED | OUTPATIENT
Start: 2019-01-15 | End: 2020-02-17

## 2019-01-15 RX ORDER — LOVASTATIN 40 MG/1
40 TABLET ORAL NIGHTLY
Qty: 90 TABLET | Refills: 3 | Status: SHIPPED | OUTPATIENT
Start: 2019-01-15 | End: 2020-02-17 | Stop reason: SDUPTHER

## 2019-01-15 NOTE — PROGRESS NOTES
Subjective:      Patient ID: Anupama Norman is a 63 y.o. female.    Chief Complaint: Annual Exam    Disclaimer:  This note is prepared using voice recognition software and as such is likely to have errors and has not been proof read. Please contact me for questions.     Patient is coming in today for wellness exam and followup of chronic issues. Wt is up 3lbs this year, but overall still down 23lbs.     Did do  GYN surgery last year for rectal prolapse and PAWEL. She's been doing quite well.  No prior dvts/PEs. No cp. No sob.     She now has diabetes.  She's on metformin now. Needing to do labs today. Working on weight loss.  Has meters.  Needing to urine microalbumin.  Diabetic foot exam today.    declines all vaccines.     She has hypothyroidism which is currently controlled 175 µg.  Labs are ordered for today. Doing well with meds. Working on weight loss.     Hyperlipidemia currently on a statin therapy.  Needing labs today. Did do coffee this am.     Depressions currently controlled followed by Dr. Cifuentes at the mental health clinic in [place].  She did  and  her . On paxil at 60mg and trazadone 50mg at night.  Gets through local pharmacy    Hypertension is controlled with a diuretic. Stable.     Has chronic gastroesophageal reflux disease for which she only tolerates Nexium.  She has tried and failed Prilosec Prevacid omeprazole Protonix Zantac and Pepcid.  Most recently she was buying Nexium from over-the-counter and taking 2 tablets.  Since she switched to Preedo a informed her to make sure that we note this on the prescription and that they should get approved for her.    She is needing to update mammogram.  She declines a flu shot today.               Diabetes   She presents for her follow-up diabetic visit. She has type 2 diabetes mellitus. No MedicAlert identification noted. The initial diagnosis of diabetes was made 12 months ago. Pertinent negatives for hypoglycemia  include no confusion, dizziness, headaches, hunger, mood changes, nervousness/anxiousness, pallor, seizures, sleepiness, sweats or tremors. Pertinent negatives for diabetes include no blurred vision, no chest pain, no fatigue, no foot paresthesias, no foot ulcerations, no polydipsia, no polyphagia, no polyuria, no visual change and no weight loss. Pertinent negatives for hypoglycemia complications include no blackouts, no nocturnal hypoglycemia, no required assistance and no required glucagon injection. Symptoms are stable. Pertinent negatives for diabetic complications include no autonomic neuropathy, CVA, heart disease, impotence, nephropathy, peripheral neuropathy, PVD or retinopathy. Risk factors for coronary artery disease include hypertension and obesity. Current diabetic treatment includes oral agent (monotherapy). She is compliant with treatment most of the time. Her weight is stable. She is following a generally healthy diet. Meal planning includes carbohydrate counting. She has not had a previous visit with a dietitian. She participates in exercise three times a week. She monitors blood glucose at home 1-2 x per week. Blood glucose monitoring compliance is good. There is no change in her home blood glucose trend. She does not see a podiatrist.Eye exam is current.       Lab Results   Component Value Date    WBC 8.43 01/23/2018    HGB 13.5 01/23/2018    HCT 41.9 01/23/2018     01/23/2018    CHOL 168 01/23/2018    TRIG 104 01/23/2018    HDL 46 01/23/2018    ALT 23 01/23/2018    AST 23 01/23/2018     01/23/2018    K 4.2 01/23/2018     01/23/2018    CREATININE 0.9 01/23/2018    BUN 14 01/23/2018    CO2 28 01/23/2018    TSH 0.693 01/23/2018    HGBA1C 5.4 10/19/2018       Mammo Digital Screening Bilat with CAD  Result:  Mammo Digital Screening Bilat with CAD    History:  Patient is 62 y.o. and is seen for encounter for screening mammogram for   breast cancer.    Films Compared:  Compared to:  03/29/2016 Mammo Digital Screening Bilat with CAD (No   Change), 01/08/2015 Mammo Digital Screening Bilat with CAD (No Change),   and 09/13/2012 US Breast Biopsy Right (No Change)    Findings:  Computer-aided detection was utilized in the interpretation of this   examination.       The breasts have scattered areas of fibroglandular density. There is no   evidence of suspicious masses, microcalcifications or architectural   distortion.    Impression:  No mammographic evidence of malignancy.    BI-RADS Category 1: Negative    Recommendation:  Routine Screening Mammogram in 1 year is recommended    The patient's estimated lifetime risk of breast cancer (to age 85) based   on Tyrer-Cuzick - 7 risk assessment model is: Tyrer-Cuzick: 7.66 %.   According to the [employer] Society,  patients with a lifetime breast   cancer risk of 20% or higher might benefit from supplemental screening   tests.        Review of Systems   Constitutional: Negative for chills, fatigue, fever and weight loss.   HENT: Negative for ear pain and trouble swallowing.    Eyes: Negative for blurred vision, pain and visual disturbance.   Respiratory: Negative for cough and shortness of breath.    Cardiovascular: Negative for chest pain and leg swelling.   Gastrointestinal: Negative for abdominal pain, blood in stool, nausea and vomiting.   Endocrine: Negative for cold intolerance, heat intolerance, polydipsia, polyphagia and polyuria.   Genitourinary: Negative for dysuria, frequency and impotence.   Musculoskeletal: Negative for joint swelling, myalgias and neck pain.   Skin: Negative for color change, pallor and rash.   Neurological: Negative for dizziness, tremors, seizures and headaches.   Psychiatric/Behavioral: Negative for behavioral problems, confusion and sleep disturbance. The patient is not nervous/anxious.      Objective:     Vitals:    01/15/19 1151   BP: 112/88   Pulse: 68   Temp: 98 °F (36.7 °C)   Weight: 101.6 kg (223 lb 15.8 oz)  "  Height: 5' 4" (1.626 m)     Physical Exam   Constitutional: She is oriented to person, place, and time. She appears well-developed and well-nourished.   Obese WF   HENT:   Head: Normocephalic and atraumatic.   Right Ear: External ear normal.   Left Ear: External ear normal.   Mouth/Throat: Oropharynx is clear and moist.   Eyes: EOM are normal.   Neck: Normal range of motion. Neck supple. No thyromegaly present.   Cardiovascular: Normal rate and regular rhythm. Exam reveals no gallop and no friction rub.   No murmur heard.  Pulses:       Dorsalis pedis pulses are 2+ on the right side, and 2+ on the left side.   Pulmonary/Chest: Effort normal. No respiratory distress. She has no wheezes. She has no rales.   Abdominal: Soft. Bowel sounds are normal. She exhibits no distension. There is no tenderness. There is no rebound.   Musculoskeletal: Normal range of motion. She exhibits no edema.        Right foot: There is normal range of motion and no deformity.        Left foot: There is normal range of motion and no deformity.   Feet:   Right Foot:   Protective Sensation: 4 sites tested. 4 sites sensed.   Skin Integrity: Positive for warmth. Negative for ulcer, blister, skin breakdown, erythema, callus or dry skin.   Left Foot:   Protective Sensation: 4 sites tested. 4 sites sensed.   Skin Integrity: Positive for warmth. Negative for ulcer, blister, skin breakdown, erythema, callus or dry skin.   Lymphadenopathy:     She has no cervical adenopathy.   Neurological: She is alert and oriented to person, place, and time.   Skin: Skin is warm and dry. No rash noted.   Psychiatric: She has a normal mood and affect. Her behavior is normal. Judgment and thought content normal.   Vitals reviewed.    Assessment:     1. Routine general medical examination at a health care facility    2. Recurrent major depression in full remission    3. Essential hypertension    4. Mixed diabetic hyperlipidemia associated with type 2 diabetes mellitus "    5. Hypothyroidism due to acquired atrophy of thyroid    6. Gastroesophageal reflux disease, esophagitis presence not specified    7. Controlled type 2 diabetes mellitus without complication, without long-term current use of insulin    8. Severe obesity (BMI 35.0-39.9) with comorbidity    9. Dyslipidemia    10. Breast cancer screening by mammogram    11. Vitamin D deficiency      Plan:   Anupama was seen today for annual exam.    Diagnoses and all orders for this visit:    Routine general medical examination at a health care facility-labs ordered today discussed health maintenance  -     TSH; Future  -     T4, free; Future  -     Lipid panel; Future  -     Microalbumin/creatinine urine ratio  -     Hemoglobin A1c; Future  -     Insulin, random; Future  -     Comprehensive metabolic panel; Future  -     CBC auto differential; Future  -     Vitamin D; Future    Recurrent major depression in full remission-[facility]ed by [facility] on Paxil and trazodone  -     TSH; Future  -     T4, free; Future  -     Lipid panel; Future  -     Microalbumin/creatinine urine ratio  -     Hemoglobin A1c; Future  -     Insulin, random; Future  -     Comprehensive metabolic panel; Future  -     CBC auto differential; Future  -     Vitamin D; Future    Essential hypertension-stable labs ordered today discussed health maintenance urine today  -     TSH; Future  -     T4, free; Future  -     Lipid panel; Future  -     Microalbumin/creatinine urine ratio  -     Hemoglobin A1c; Future  -     Insulin, random; Future  -     Comprehensive metabolic panel; Future  -     CBC auto differential; Future  -     Vitamin D; Future    Mixed diabetic hyperlipidemia associated with type 2 diabetes mellitus - stable, labs ordered today.  Continue with current medications and interventions until labs are reviewed to make adjustments.     -     TSH; Future  -     T4, free; Future  -     Lipid panel; Future  -     Microalbumin/creatinine urine ratio  -      Hemoglobin A1c; Future  -     Insulin, random; Future  -     Comprehensive metabolic panel; Future  -     CBC auto differential; Future  -     Vitamin D; Future    Hypothyroidism due to acquired atrophy of thyroid - stable, labs ordered today.  Continue with current medications and interventions until labs are reviewed to make adjustments.   -     TSH; Future  -     T4, free; Future  -     Lipid panel; Future  -     Microalbumin/creatinine urine ratio  -     Hemoglobin A1c; Future  -     Insulin, random; Future  -     Comprehensive metabolic panel; Future  -     CBC auto differential; Future  -     Vitamin D; Future    Gastroesophageal reflux disease, esophagitis presence not specified - stable, labs ordered today.  Continue with current medications and interventions until labs are reviewed to make adjustments.  Needs to continue Nexium.  Tried and failed Prilosec Prevacid omeprazole Zantac Pepcid and Protonix.  -     TSH; Future  -     T4, free; Future  -     Lipid panel; Future  -     Microalbumin/creatinine urine ratio  -     Hemoglobin A1c; Future  -     Insulin, random; Future  -     Comprehensive metabolic panel; Future  -     CBC auto differential; Future  -     Vitamin D; Future    Controlled type 2 diabetes mellitus without complication, without long-term current use of insulin  - stable, labs ordered today.  Continue with current medications and interventions until labs are reviewed to make adjustments.   -     TSH; Future  -     T4, free; Future  -     Lipid panel; Future  -     Microalbumin/creatinine urine ratio  -     Hemoglobin A1c; Future  -     Insulin, random; Future  -     Comprehensive metabolic panel; Future  -     CBC auto differential; Future  -     Vitamin D; Future    Severe obesity (BMI 35.0-39.9) with comorbidity-continue work on diet exercise and weight loss  -     TSH; Future  -     T4, free; Future  -     Lipid panel; Future  -     Microalbumin/creatinine urine ratio  -      Hemoglobin A1c; Future  -     Insulin, random; Future  -     Comprehensive metabolic panel; Future  -     CBC auto differential; Future  -     Vitamin D; Future    Dyslipidemia-lab work today  -     TSH; Future  -     T4, free; Future  -     Lipid panel; Future  -     Microalbumin/creatinine urine ratio  -     Hemoglobin A1c; Future  -     Insulin, random; Future  -     Comprehensive metabolic panel; Future  -     CBC auto differential; Future  -     Vitamin D; Future  -     lovastatin (MEVACOR) 40 MG tablet; Take 1 tablet (40 mg total) by mouth nightly.    Breast cancer screening by mammogram-schedule  -     Mammo Digital Screening Bilat with CAD; Future    Vitamin D deficiency-on supplementation lab work today  -     Vitamin D; Future    Other orders  -     levothyroxine (SYNTHROID) 150 MCG tablet; TAKE 1 TABLET  BEFORE BREAKFAST.  -     metFORMIN (GLUCOPHAGE-XR) 500 MG 24 hr tablet; 4 tab po daily  -     triamterene-hydrochlorothiazide 75-50 mg (MAXZIDE) 75-50 mg per tablet; TAKE 1 TABLET ONE TIME DAILY  -     esomeprazole (NEXIUM) 40 MG capsule; Take 1 capsule (40 mg total) by mouth before breakfast.            Follow-up in about 1 year (around 1/15/2020) for physical with Dr NUNES.    There are no Patient Instructions on file for this visit.

## 2019-01-16 LAB
ESTIMATED AVG GLUCOSE: 117 MG/DL
HBA1C MFR BLD HPLC: 5.7 %

## 2019-02-21 NOTE — TELEPHONE ENCOUNTER
"    Reason for Disposition   Mild constipation (all triage questions negative)    Answer Assessment - Initial Assessment Questions  1. STOOL PATTERN OR FREQUENCY: "How often do you pass bowel movements (BMs)?"  (Normal range: tid to q 3 days)  "When was the last BM passed?"         Daily     2. STRAINING: "Do you have to strain to have a BM?"    no    3. RECTAL PAIN: "Does your rectum hurt when the stool comes out?" If so, ask: "Do you have hemorrhoids? How bad is the pain?"  (Scale 1-10; or mild, moderate, severe)      Denies    4. STOOL COMPOSITION: "Are the stools hard?"       denies    5. BLOOD ON STOOLS: "Has there been any blood on the toilet tissue or on the surface of the BM?" If so, ask: "When was the last time?"       Last Wednesday    6. CHRONIC CONSTIPATION: "Is this a new problem for you?"  If no, ask: How long have you had this problem?" (days, weeks, months)       Denies    7. CHANGES IN DIET: "Have there been any recent changes in your diet?"       don't really feel like eating since surgery    8. MEDICATIONS: "Have you been taking any new medications?"      Pain pills    9. LAXATIVES: "Have you been using any laxatives or enemas?"  If yes, ask "What, how often, and when was the last time?"  Denies    10. CAUSE: "What do you think is causing the constipation?"   Recent surgery    11. OTHER SYMPTOMS: "Do you have any other symptoms?" (e.g., abdominal pain, fever, vomiting)  denies    12. PREGNANCY: "Is there any chance you are pregnant?" "When was your last menstrual period?"  Denies    Protocols used:  CONSTIPATION-A-    Patient reports no bm since Wednesday.Education completed per [facility] including  Home care advice for constipation. Patient/ Caregiver verbalize understanding.  "
stated

## 2019-03-08 ENCOUNTER — PES CALL (OUTPATIENT)
Dept: ADMINISTRATIVE | Facility: CLINIC | Age: 64
End: 2019-03-08

## 2019-08-23 ENCOUNTER — OFFICE VISIT (OUTPATIENT)
Dept: INTERNAL MEDICINE | Facility: CLINIC | Age: 64
End: 2019-08-23
Payer: MEDICARE

## 2019-08-23 VITALS
HEIGHT: 64 IN | SYSTOLIC BLOOD PRESSURE: 130 MMHG | BODY MASS INDEX: 38.62 KG/M2 | WEIGHT: 226.19 LBS | DIASTOLIC BLOOD PRESSURE: 84 MMHG | TEMPERATURE: 98 F

## 2019-08-23 DIAGNOSIS — I10 ESSENTIAL HYPERTENSION: Chronic | ICD-10-CM

## 2019-08-23 DIAGNOSIS — F33.42 RECURRENT MAJOR DEPRESSION IN FULL REMISSION: Chronic | ICD-10-CM

## 2019-08-23 DIAGNOSIS — E11.69 MIXED DIABETIC HYPERLIPIDEMIA ASSOCIATED WITH TYPE 2 DIABETES MELLITUS: Chronic | ICD-10-CM

## 2019-08-23 DIAGNOSIS — E11.9 CONTROLLED TYPE 2 DIABETES MELLITUS WITHOUT COMPLICATION, WITHOUT LONG-TERM CURRENT USE OF INSULIN: ICD-10-CM

## 2019-08-23 DIAGNOSIS — K21.9 GASTROESOPHAGEAL REFLUX DISEASE, ESOPHAGITIS PRESENCE NOT SPECIFIED: ICD-10-CM

## 2019-08-23 DIAGNOSIS — E55.9 VITAMIN D DEFICIENCY: ICD-10-CM

## 2019-08-23 DIAGNOSIS — E03.4 HYPOTHYROIDISM DUE TO ACQUIRED ATROPHY OF THYROID: Chronic | ICD-10-CM

## 2019-08-23 DIAGNOSIS — Z00.00 ENCOUNTER FOR PREVENTIVE HEALTH EXAMINATION: Primary | ICD-10-CM

## 2019-08-23 DIAGNOSIS — Z86.010 HISTORY OF COLON POLYPS: ICD-10-CM

## 2019-08-23 DIAGNOSIS — E66.01 SEVERE OBESITY (BMI 35.0-39.9) WITH COMORBIDITY: ICD-10-CM

## 2019-08-23 DIAGNOSIS — E78.2 MIXED DIABETIC HYPERLIPIDEMIA ASSOCIATED WITH TYPE 2 DIABETES MELLITUS: Chronic | ICD-10-CM

## 2019-08-23 PROCEDURE — 3079F PR MOST RECENT DIASTOLIC BLOOD PRESSURE 80-89 MM HG: ICD-10-PCS | Mod: CPTII,S$GLB,, | Performed by: PHYSICIAN ASSISTANT

## 2019-08-23 PROCEDURE — 3044F HG A1C LEVEL LT 7.0%: CPT | Mod: CPTII,S$GLB,, | Performed by: PHYSICIAN ASSISTANT

## 2019-08-23 PROCEDURE — 99499 UNLISTED E&M SERVICE: CPT | Mod: S$GLB,,, | Performed by: PHYSICIAN ASSISTANT

## 2019-08-23 PROCEDURE — 3079F DIAST BP 80-89 MM HG: CPT | Mod: CPTII,S$GLB,, | Performed by: PHYSICIAN ASSISTANT

## 2019-08-23 PROCEDURE — G0439 PPPS, SUBSEQ VISIT: HCPCS | Mod: S$GLB,,, | Performed by: PHYSICIAN ASSISTANT

## 2019-08-23 PROCEDURE — G0439 PR MEDICARE ANNUAL WELLNESS SUBSEQUENT VISIT: ICD-10-PCS | Mod: S$GLB,,, | Performed by: PHYSICIAN ASSISTANT

## 2019-08-23 PROCEDURE — 3044F PR MOST RECENT HEMOGLOBIN A1C LEVEL <7.0%: ICD-10-PCS | Mod: CPTII,S$GLB,, | Performed by: PHYSICIAN ASSISTANT

## 2019-08-23 PROCEDURE — 99999 PR PBB SHADOW E&M-EST. PATIENT-LVL III: CPT | Mod: PBBFAC,,, | Performed by: PHYSICIAN ASSISTANT

## 2019-08-23 PROCEDURE — 99499 RISK ADDL DX/OHS AUDIT: ICD-10-PCS | Mod: S$GLB,,, | Performed by: PHYSICIAN ASSISTANT

## 2019-08-23 PROCEDURE — 3075F SYST BP GE 130 - 139MM HG: CPT | Mod: CPTII,S$GLB,, | Performed by: PHYSICIAN ASSISTANT

## 2019-08-23 PROCEDURE — 3075F PR MOST RECENT SYSTOLIC BLOOD PRESS GE 130-139MM HG: ICD-10-PCS | Mod: CPTII,S$GLB,, | Performed by: PHYSICIAN ASSISTANT

## 2019-08-23 PROCEDURE — 99999 PR PBB SHADOW E&M-EST. PATIENT-LVL III: ICD-10-PCS | Mod: PBBFAC,,, | Performed by: PHYSICIAN ASSISTANT

## 2019-08-23 NOTE — PATIENT INSTRUCTIONS
Counseling and Referral of Other Preventative  (Italic type indicates deductible and co-insurance are waived)    Patient Name: Anupama Norman  Today's Date: 8/23/2019    Health Maintenance       Date Due Completion Date    Shingles Vaccine (1 of 2) 03/19/2007 ---    Eye Exam 07/06/2018 7/6/2017    Override on 3/1/2017: Done (Dr Lainez)    Mammogram 08/03/2018 8/3/2017    Override on 9/13/2012: Done    Override on 4/20/2010: Done    Override on 4/27/2007: Done    Hemoglobin A1c 07/15/2019 1/15/2019    Influenza Vaccine (1) 09/01/2019 1/23/2018 (Declined)    Override on 1/23/2018: Declined    Override on 2/2/2017: Declined    Override on 1/5/2016: Declined    Low Dose Statin 01/15/2020 1/15/2019    Foot Exam 01/15/2020 1/15/2019    Override on 1/15/2019: Done    Override on 1/23/2018: Done    Lipid Panel 01/15/2020 1/15/2019    Urine Microalbumin 01/15/2020 1/15/2019    Colonoscopy 04/08/2020 4/8/2015    TETANUS VACCINE 07/18/2027 7/18/2017 (Declined)    Override on 7/18/2017: Declined        No orders of the defined types were placed in this encounter.    The following information is provided to all patients.  This information is to help you find resources for any of the problems found today that may be affecting your health:                Living healthy guide: www.Counts include 234 beds at the [facility].[place].gov      Understanding Diabetes: www.diabetes.org      Eating healthy: www.cdc.gov/healthyweight      CDC home safety checklist: www.cdc.gov/steadi/patient.html      Agency on Aging: www.goea.[place].gov      Alcoholics anonymous (AA): www.aa.org      Physical Activity: www.anna.nih.gov/qf9virt      Tobacco use: www.quitwithusla.org     Counseling and Referral of Other Preventative  (Italic type indicates deductible and co-insurance are waived)    Patient Name: Anupama Norman  Today's Date: 8/23/2019    Health Maintenance       Date Due Completion Date    Shingles Vaccine (1 of 2) 03/19/2007 ---    Eye Exam 07/06/2018 7/6/2017     Override on 3/1/2017: Done (Dr Lainez)    Mammogram 08/03/2018 8/3/2017    Override on 9/13/2012: Done    Override on 4/20/2010: Done    Override on 4/27/2007: Done    Hemoglobin A1c 07/15/2019 1/15/2019    Influenza Vaccine (1) 09/01/2019 1/23/2018 (Declined)    Override on 1/23/2018: Declined    Override on 2/2/2017: Declined    Override on 1/5/2016: Declined    Low Dose Statin 01/15/2020 1/15/2019    Foot Exam 01/15/2020 1/15/2019    Override on 1/15/2019: Done    Override on 1/23/2018: Done    Lipid Panel 01/15/2020 1/15/2019    Urine Microalbumin 01/15/2020 1/15/2019    Colonoscopy 04/08/2020 4/8/2015    TETANUS VACCINE 07/18/2027 7/18/2017 (Declined)    Override on 7/18/2017: Declined        No orders of the defined types were placed in this encounter.    The following information is provided to all patients.  This information is to help you find resources for any of the problems found today that may be affecting your health:                Living healthy guide: www.[facility].[place].gov      Understanding Diabetes: www.diabetes.org      Eating healthy: www.cdc.gov/healthyweight      CDC home safety checklist: www.cdc.gov/steadi/patient.html      Agency on Aging: www.goea.[place].[facility]      Alcoholics anonymous (AA): www.aa.org      Physical Activity: www.anna.nih.gov/wd1gvez      Tobacco use: www.quitwithusla.org

## 2019-08-23 NOTE — PROGRESS NOTES
"Anupama Norman presented for a  Medicare AWV and comprehensive Health Risk Assessment today. The following components were reviewed and updated:    · Medical history  · Family History  · Social history  · Allergies and Current Medications  · Health Risk Assessment  · Health Maintenance  · Care Team     ** See Completed Assessments for Annual Wellness Visit within the encounter summary.**       The following assessments were completed:  · Living Situation  · CAGE  · Depression Screening  · Timed Get Up and Go  · Whisper Test  · Cognitive Function Screening  · Nutrition Screening  · ADL Screening  · PAQ Screening    Patient Care Team:  Kim Krueger MD as PCP - General (Family Medicine)  Daryl Maxwell III, MD as Consulting Physician (Gastroenterology)  Beatriz Mcgee LPN as Care Coordinator (Internal Medicine)  Luis Hollis MD (Ophthalmology)  Urbano Martin MD (Psychiatry)    Vitals:    08/23/19 1112   BP: 130/84   BP Location: Left arm   Temp: 98.3 °F (36.8 °C)   TempSrc: Oral   Weight: 102.6 kg (226 lb 3.1 oz)   Height: 5' 4" (1.626 m)     Body mass index is 38.83 kg/m².     Physical Exam   Constitutional: She is oriented to person, place, and time. She appears well-developed and well-nourished. No distress.   HENT:   Head: Normocephalic and atraumatic.   Eyes: EOM are normal. No scleral icterus.   Neck: Neck supple.   Cardiovascular: Normal rate and regular rhythm.   Pulmonary/Chest: Effort normal and breath sounds normal. No respiratory distress. She has no decreased breath sounds. She has no wheezes. She has no rhonchi. She has no rales.   Musculoskeletal: Normal range of motion.   Neurological: She is alert and oriented to person, place, and time.   Skin: Skin is warm and dry.   Psychiatric: She has a normal mood and affect. Her speech is normal and behavior is normal. Thought content normal.         Diagnoses and health risks identified today and associated recommendations/orders:    1. " Encounter for preventive health examination    2. Controlled type 2 diabetes mellitus without complication, without long-term current use of insulin  DM controlled. Pt taking metformin. Monitor glucose, check feet daily, and stay UTD with eye doctor. Continue current treatment plan as prescribed by your PCP and f/u with your PCP for further management.  Component      Latest Ref Rng & Units 1/15/2019 10/19/2018   Hemoglobin A1C External      4.0 - 5.6 % 5.7 (H) 5.4   Estimated Avg Glucose      68 - 131 mg/dL 117 108     3. Mixed diabetic hyperlipidemia associated with type 2 diabetes mellitus  Stable. Pt taking lovastatin as well as aspirin. Continue current treatment plan as prescribed by your PCP and f/u with your PCP for further management.  Component      Latest Ref Rng & Units 1/15/2019 1/23/2018   Cholesterol      120 - 199 mg/dL 157 168   Triglycerides      30 - 150 mg/dL 86 104   HDL      40 - 75 mg/dL 51 46   LDL Cholesterol External      63.0 - 159.0 mg/dL 88.8 101.2   Hdl/Cholesterol Ratio      20.0 - 50.0 % 32.5 27.4   Total Cholesterol/HDL Ratio      2.0 - 5.0 3.1 3.7   Non-HDL Cholesterol      mg/dL 106 122     4. Severe obesity (BMI 35.0-39.9) with comorbidity  Not currently controlled. F/u with your PCP to discuss adjustments to your treatment plan.    5. Recurrent major depression in full remission  Stable. PHQ-2 score today = 3. Pt taking Paxil and trazodone. Continue current treatment plan as prescribed by your PCP and f/u with your PCP for further management.    6. Essential hypertension  Stable. Pt taking triamterene-HCTZ. Continue current treatment plan as prescribed by your PCP and f/u with your PCP for further management.    7. Hypothyroidism due to acquired atrophy of thyroid  Stable. Pt taking Synthroid. Continue current treatment plan as prescribed by your PCP and f/u with your PCP for further management.  Component      Latest Ref Rng & Units 1/15/2019 1/23/2018   TSH      0.400 - 4.000  "uIU/mL 0.560 0.693   Free T4      0.71 - 1.51 ng/dL 1.52 (H) 1.56 (H)     8. Gastroesophageal reflux disease, esophagitis presence not specified  Stable. Pt taking Nexium. As per PCP Dr. Krueger's 1/15/19 office note: "She has tried and failed Prilosec Prevacid omeprazole Protonix Zantac and Pepcid." Continue current treatment plan as prescribed by your PCP and f/u with your PCP for further management.    9. History of colon polyps  Stable. Colonoscopy 4/8/15. Continue current treatment plan as prescribed by your PCP and f/u with your PCP for further management.    10. Vitamin D deficiency  Stable. Pt taking vit D suppl. Continue current treatment plan as prescribed by your PCP and f/u with your PCP for further management.  Component      Latest Ref Rng & Units 1/15/2019 9/9/2014   Vit D, 25-Hydroxy      30 - 96 ng/mL 15 (L) 22 (L)     Please obtain any medical records from past / present outside medical providers and give to PCP for review and further medical recommendations.    Provided Anupama with a 5-10 year written screening schedule and personal prevention plan. Recommendations were developed using the USPSTF age appropriate recommendations. Education, counseling, and referrals were provided as needed. After Visit Summary printed and given to patient which includes a list of additional screenings\tests needed.    Follow up in about 1 year (around 8/23/2020) for HRA. F/u with PCP Dr. Krueger and specialists as recommended for health management.    GLEN Faith  I offered to discuss end of life issues, including information on how to make advance directives that the patient could use to name someone who would make medical decisions on their behalf if they became too ill to make themselves.    _X_Patient declined  ___Patient is interested, I provided paper work and offered to discuss.  "

## 2019-09-24 ENCOUNTER — PATIENT OUTREACH (OUTPATIENT)
Dept: ADMINISTRATIVE | Facility: HOSPITAL | Age: 64
End: 2019-09-24

## 2019-10-16 DIAGNOSIS — E11.9 TYPE 2 DIABETES MELLITUS WITHOUT COMPLICATION: ICD-10-CM

## 2020-02-10 ENCOUNTER — PATIENT OUTREACH (OUTPATIENT)
Dept: ADMINISTRATIVE | Facility: HOSPITAL | Age: 65
End: 2020-02-10

## 2020-02-10 DIAGNOSIS — E03.4 HYPOTHYROIDISM DUE TO ACQUIRED ATROPHY OF THYROID: Chronic | ICD-10-CM

## 2020-02-10 DIAGNOSIS — E78.2 MIXED DIABETIC HYPERLIPIDEMIA ASSOCIATED WITH TYPE 2 DIABETES MELLITUS: Chronic | ICD-10-CM

## 2020-02-10 DIAGNOSIS — I10 ESSENTIAL HYPERTENSION: Primary | Chronic | ICD-10-CM

## 2020-02-10 DIAGNOSIS — E55.9 VITAMIN D DEFICIENCY: ICD-10-CM

## 2020-02-10 DIAGNOSIS — E11.9 CONTROLLED TYPE 2 DIABETES MELLITUS WITHOUT COMPLICATION, WITHOUT LONG-TERM CURRENT USE OF INSULIN: ICD-10-CM

## 2020-02-10 DIAGNOSIS — E11.69 MIXED DIABETIC HYPERLIPIDEMIA ASSOCIATED WITH TYPE 2 DIABETES MELLITUS: Chronic | ICD-10-CM

## 2020-02-10 NOTE — PROGRESS NOTES
Patient on noncompliant A1C report. Called to schedule repeat A1C lab. Scheduled annual labs for 2/10/2020.  Annual appt already scheduled 2/17/2020. Attempted to schedule mammogram, pt declined.  Pre visit chart audit completed 2/10/2020.  **KDL**

## 2020-02-14 ENCOUNTER — LAB VISIT (OUTPATIENT)
Dept: LAB | Facility: HOSPITAL | Age: 65
End: 2020-02-14
Attending: FAMILY MEDICINE
Payer: MEDICARE

## 2020-02-14 DIAGNOSIS — I10 ESSENTIAL HYPERTENSION: Chronic | ICD-10-CM

## 2020-02-14 DIAGNOSIS — E11.9 TYPE 2 DIABETES MELLITUS WITHOUT COMPLICATION: ICD-10-CM

## 2020-02-14 DIAGNOSIS — E78.2 MIXED DIABETIC HYPERLIPIDEMIA ASSOCIATED WITH TYPE 2 DIABETES MELLITUS: Chronic | ICD-10-CM

## 2020-02-14 DIAGNOSIS — E03.4 HYPOTHYROIDISM DUE TO ACQUIRED ATROPHY OF THYROID: Chronic | ICD-10-CM

## 2020-02-14 DIAGNOSIS — E11.69 MIXED DIABETIC HYPERLIPIDEMIA ASSOCIATED WITH TYPE 2 DIABETES MELLITUS: Chronic | ICD-10-CM

## 2020-02-14 DIAGNOSIS — E11.9 CONTROLLED TYPE 2 DIABETES MELLITUS WITHOUT COMPLICATION, WITHOUT LONG-TERM CURRENT USE OF INSULIN: ICD-10-CM

## 2020-02-14 DIAGNOSIS — E55.9 VITAMIN D DEFICIENCY: ICD-10-CM

## 2020-02-14 LAB
25(OH)D3+25(OH)D2 SERPL-MCNC: 38 NG/ML (ref 30–96)
ALBUMIN SERPL BCP-MCNC: 3.7 G/DL (ref 3.5–5.2)
ALP SERPL-CCNC: 97 U/L (ref 55–135)
ALT SERPL W/O P-5'-P-CCNC: 18 U/L (ref 10–44)
ANION GAP SERPL CALC-SCNC: 10 MMOL/L (ref 8–16)
AST SERPL-CCNC: 21 U/L (ref 10–40)
BASOPHILS # BLD AUTO: 0.05 K/UL (ref 0–0.2)
BASOPHILS NFR BLD: 0.6 % (ref 0–1.9)
BILIRUB SERPL-MCNC: 0.4 MG/DL (ref 0.1–1)
BUN SERPL-MCNC: 10 MG/DL (ref 8–23)
CALCIUM SERPL-MCNC: 9.9 MG/DL (ref 8.7–10.5)
CHLORIDE SERPL-SCNC: 103 MMOL/L (ref 95–110)
CHOLEST SERPL-MCNC: 159 MG/DL (ref 120–199)
CHOLEST/HDLC SERPL: 3.1 {RATIO} (ref 2–5)
CO2 SERPL-SCNC: 28 MMOL/L (ref 23–29)
CREAT SERPL-MCNC: 1 MG/DL (ref 0.5–1.4)
DIFFERENTIAL METHOD: ABNORMAL
EOSINOPHIL # BLD AUTO: 0.4 K/UL (ref 0–0.5)
EOSINOPHIL NFR BLD: 4.3 % (ref 0–8)
ERYTHROCYTE [DISTWIDTH] IN BLOOD BY AUTOMATED COUNT: 14.1 % (ref 11.5–14.5)
EST. GFR  (AFRICAN AMERICAN): >60 ML/MIN/1.73 M^2
EST. GFR  (NON AFRICAN AMERICAN): >60 ML/MIN/1.73 M^2
GLUCOSE SERPL-MCNC: 111 MG/DL (ref 70–110)
HCT VFR BLD AUTO: 45.6 % (ref 37–48.5)
HDLC SERPL-MCNC: 51 MG/DL (ref 40–75)
HDLC SERPL: 32.1 % (ref 20–50)
HGB BLD-MCNC: 14 G/DL (ref 12–16)
IMM GRANULOCYTES # BLD AUTO: 0.01 K/UL (ref 0–0.04)
IMM GRANULOCYTES NFR BLD AUTO: 0.1 % (ref 0–0.5)
LDLC SERPL CALC-MCNC: 84.8 MG/DL (ref 63–159)
LYMPHOCYTES # BLD AUTO: 2.1 K/UL (ref 1–4.8)
LYMPHOCYTES NFR BLD: 24.8 % (ref 18–48)
MCH RBC QN AUTO: 29.9 PG (ref 27–31)
MCHC RBC AUTO-ENTMCNC: 30.7 G/DL (ref 32–36)
MCV RBC AUTO: 97 FL (ref 82–98)
MONOCYTES # BLD AUTO: 0.6 K/UL (ref 0.3–1)
MONOCYTES NFR BLD: 6.7 % (ref 4–15)
NEUTROPHILS # BLD AUTO: 5.3 K/UL (ref 1.8–7.7)
NEUTROPHILS NFR BLD: 63.5 % (ref 38–73)
NONHDLC SERPL-MCNC: 108 MG/DL
NRBC BLD-RTO: 0 /100 WBC
PLATELET # BLD AUTO: 218 K/UL (ref 150–350)
PMV BLD AUTO: 12 FL (ref 9.2–12.9)
POTASSIUM SERPL-SCNC: 4.3 MMOL/L (ref 3.5–5.1)
PROT SERPL-MCNC: 7.5 G/DL (ref 6–8.4)
RBC # BLD AUTO: 4.68 M/UL (ref 4–5.4)
SODIUM SERPL-SCNC: 141 MMOL/L (ref 136–145)
T4 FREE SERPL-MCNC: 1.59 NG/DL (ref 0.71–1.51)
TRIGL SERPL-MCNC: 116 MG/DL (ref 30–150)
TSH SERPL DL<=0.005 MIU/L-ACNC: 0.17 UIU/ML (ref 0.4–4)
WBC # BLD AUTO: 8.36 K/UL (ref 3.9–12.7)

## 2020-02-14 PROCEDURE — 83036 HEMOGLOBIN GLYCOSYLATED A1C: CPT

## 2020-02-14 PROCEDURE — 82306 VITAMIN D 25 HYDROXY: CPT

## 2020-02-14 PROCEDURE — 84443 ASSAY THYROID STIM HORMONE: CPT

## 2020-02-14 PROCEDURE — 85025 COMPLETE CBC W/AUTO DIFF WBC: CPT

## 2020-02-14 PROCEDURE — 80053 COMPREHEN METABOLIC PANEL: CPT

## 2020-02-14 PROCEDURE — 80061 LIPID PANEL: CPT

## 2020-02-14 PROCEDURE — 83525 ASSAY OF INSULIN: CPT

## 2020-02-14 PROCEDURE — 36415 COLL VENOUS BLD VENIPUNCTURE: CPT | Mod: PO

## 2020-02-14 PROCEDURE — 84439 ASSAY OF FREE THYROXINE: CPT

## 2020-02-15 LAB
ESTIMATED AVG GLUCOSE: 123 MG/DL (ref 68–131)
HBA1C MFR BLD HPLC: 5.9 % (ref 4–5.6)
INSULIN COLLECTION INTERVAL: NORMAL
INSULIN SERPL-ACNC: 9.3 UU/ML

## 2020-02-17 ENCOUNTER — OFFICE VISIT (OUTPATIENT)
Dept: INTERNAL MEDICINE | Facility: CLINIC | Age: 65
End: 2020-02-17
Payer: MEDICARE

## 2020-02-17 VITALS
TEMPERATURE: 98 F | DIASTOLIC BLOOD PRESSURE: 80 MMHG | HEIGHT: 64 IN | HEART RATE: 70 BPM | WEIGHT: 223.75 LBS | BODY MASS INDEX: 38.2 KG/M2 | SYSTOLIC BLOOD PRESSURE: 120 MMHG

## 2020-02-17 DIAGNOSIS — Z00.00 ROUTINE GENERAL MEDICAL EXAMINATION AT A HEALTH CARE FACILITY: ICD-10-CM

## 2020-02-17 DIAGNOSIS — E11.9 CONTROLLED TYPE 2 DIABETES MELLITUS WITHOUT COMPLICATION, WITHOUT LONG-TERM CURRENT USE OF INSULIN: ICD-10-CM

## 2020-02-17 DIAGNOSIS — E03.4 HYPOTHYROIDISM DUE TO ACQUIRED ATROPHY OF THYROID: Chronic | ICD-10-CM

## 2020-02-17 DIAGNOSIS — E78.5 DYSLIPIDEMIA: ICD-10-CM

## 2020-02-17 DIAGNOSIS — F33.42 RECURRENT MAJOR DEPRESSION IN FULL REMISSION: Chronic | ICD-10-CM

## 2020-02-17 DIAGNOSIS — K21.9 GASTROESOPHAGEAL REFLUX DISEASE, ESOPHAGITIS PRESENCE NOT SPECIFIED: ICD-10-CM

## 2020-02-17 DIAGNOSIS — I10 ESSENTIAL HYPERTENSION: Chronic | ICD-10-CM

## 2020-02-17 DIAGNOSIS — Z12.31 ENCOUNTER FOR SCREENING MAMMOGRAM FOR BREAST CANCER: ICD-10-CM

## 2020-02-17 DIAGNOSIS — E11.69 MIXED DIABETIC HYPERLIPIDEMIA ASSOCIATED WITH TYPE 2 DIABETES MELLITUS: Primary | Chronic | ICD-10-CM

## 2020-02-17 DIAGNOSIS — E66.01 SEVERE OBESITY (BMI 35.0-39.9) WITH COMORBIDITY: ICD-10-CM

## 2020-02-17 DIAGNOSIS — E78.2 MIXED DIABETIC HYPERLIPIDEMIA ASSOCIATED WITH TYPE 2 DIABETES MELLITUS: Primary | Chronic | ICD-10-CM

## 2020-02-17 PROCEDURE — 3074F SYST BP LT 130 MM HG: CPT | Mod: CPTII,S$GLB,, | Performed by: FAMILY MEDICINE

## 2020-02-17 PROCEDURE — 3079F PR MOST RECENT DIASTOLIC BLOOD PRESSURE 80-89 MM HG: ICD-10-PCS | Mod: CPTII,S$GLB,, | Performed by: FAMILY MEDICINE

## 2020-02-17 PROCEDURE — 99999 PR PBB SHADOW E&M-EST. PATIENT-LVL III: ICD-10-PCS | Mod: PBBFAC,,, | Performed by: FAMILY MEDICINE

## 2020-02-17 PROCEDURE — 99999 PR PBB SHADOW E&M-EST. PATIENT-LVL III: CPT | Mod: PBBFAC,,, | Performed by: FAMILY MEDICINE

## 2020-02-17 PROCEDURE — 99214 OFFICE O/P EST MOD 30 MIN: CPT | Mod: S$GLB,,, | Performed by: FAMILY MEDICINE

## 2020-02-17 PROCEDURE — 3008F PR BODY MASS INDEX (BMI) DOCUMENTED: ICD-10-PCS | Mod: CPTII,S$GLB,, | Performed by: FAMILY MEDICINE

## 2020-02-17 PROCEDURE — 99499 UNLISTED E&M SERVICE: CPT | Mod: S$GLB,,, | Performed by: FAMILY MEDICINE

## 2020-02-17 PROCEDURE — 3008F BODY MASS INDEX DOCD: CPT | Mod: CPTII,S$GLB,, | Performed by: FAMILY MEDICINE

## 2020-02-17 PROCEDURE — 3044F HG A1C LEVEL LT 7.0%: CPT | Mod: CPTII,S$GLB,, | Performed by: FAMILY MEDICINE

## 2020-02-17 PROCEDURE — 3079F DIAST BP 80-89 MM HG: CPT | Mod: CPTII,S$GLB,, | Performed by: FAMILY MEDICINE

## 2020-02-17 PROCEDURE — 99214 PR OFFICE/OUTPT VISIT, EST, LEVL IV, 30-39 MIN: ICD-10-PCS | Mod: S$GLB,,, | Performed by: FAMILY MEDICINE

## 2020-02-17 PROCEDURE — 3044F PR MOST RECENT HEMOGLOBIN A1C LEVEL <7.0%: ICD-10-PCS | Mod: CPTII,S$GLB,, | Performed by: FAMILY MEDICINE

## 2020-02-17 PROCEDURE — 99499 RISK ADDL DX/OHS AUDIT: ICD-10-PCS | Mod: S$GLB,,, | Performed by: FAMILY MEDICINE

## 2020-02-17 PROCEDURE — 3074F PR MOST RECENT SYSTOLIC BLOOD PRESSURE < 130 MM HG: ICD-10-PCS | Mod: CPTII,S$GLB,, | Performed by: FAMILY MEDICINE

## 2020-02-17 RX ORDER — LEVOTHYROXINE SODIUM 137 UG/1
TABLET ORAL
Qty: 90 TABLET | Refills: 3 | Status: SHIPPED | OUTPATIENT
Start: 2020-02-17 | End: 2021-01-27 | Stop reason: SDUPTHER

## 2020-02-17 RX ORDER — OMEPRAZOLE 40 MG/1
40 CAPSULE, DELAYED RELEASE ORAL DAILY
Qty: 90 CAPSULE | Refills: 3 | Status: SHIPPED | OUTPATIENT
Start: 2020-02-17 | End: 2021-01-27 | Stop reason: SDUPTHER

## 2020-02-17 RX ORDER — METFORMIN HYDROCHLORIDE 500 MG/1
TABLET, EXTENDED RELEASE ORAL
Qty: 360 TABLET | Refills: 3 | Status: SHIPPED | OUTPATIENT
Start: 2020-02-17 | End: 2021-01-27 | Stop reason: SDUPTHER

## 2020-02-17 RX ORDER — LOVASTATIN 40 MG/1
40 TABLET ORAL NIGHTLY
Qty: 90 TABLET | Refills: 3 | Status: SHIPPED | OUTPATIENT
Start: 2020-02-17 | End: 2021-01-27 | Stop reason: SDUPTHER

## 2020-02-17 RX ORDER — TRIAMTERENE AND HYDROCHLOROTHIAZIDE 75; 50 MG/1; MG/1
TABLET ORAL
Qty: 90 TABLET | Refills: 3 | Status: SHIPPED | OUTPATIENT
Start: 2020-02-17 | End: 2021-01-27 | Stop reason: SDUPTHER

## 2020-02-17 NOTE — PROGRESS NOTES
Subjective:      Patient ID: Anupama Norman is a 64 y.o. female.    Chief Complaint:  Lab review, hypothyroidism, hyperlipidemia, cholesterol    Disclaimer:  This note is prepared using voice recognition software and as such is likely to have errors and has not been proof read. Please contact me for questions.     Patient is coming in today for wellness exam and followup of chronic issues.   Wt Readings from Last 10 Encounters:  02/17/20 : 101.5 kg (223 lb 12.3 oz)  08/23/19 : 102.6 kg (226 lb 3.1 oz)  01/15/19 : 101.6 kg (223 lb 15.8 oz)  10/19/18 : 102.5 kg (225 lb 15.5 oz)  01/23/18 : 100 kg (220 lb 7.4 oz)  11/27/17 : 102.2 kg (225 lb 3.2 oz)  11/27/17 : 102 kg (224 lb 15.7 oz)  09/27/17 : 100.7 kg (222 lb)  08/30/17 : 102.9 kg (226 lb 13.7 oz)  08/23/17 : 105.7 kg (233 lb)    The 10-year ASCVD risk score (Yemiluigi PISANO Jr., et al., 2013) is: 8.1%    Values used to calculate the score:      Age: 64 years      Sex: Female      Is Non- : No      Diabetic: Yes      Tobacco smoker: No      Systolic Blood Pressure: 120 mmHg      Is BP treated: Yes      HDL Cholesterol: 51 mg/dL      Total Cholesterol: 159 mg/dL    Lab Results       Component                Value               Date                       HGBA1C                   5.9 (H)             02/14/2020                 HGBA1C                   5.7 (H)             01/15/2019                 HGBA1C                   5.4                 10/19/2018             Lab Results       Component                Value               Date                       CHOL                     159                 02/14/2020                 CHOL                     157                 01/15/2019                 CHOL                     168                 01/23/2018            Lab Results       Component                Value               Date                       LDLCALC                  84.8                02/14/2020                 LDLCALC                   88.8                01/15/2019                 LDLCALC                  101.2               01/23/2018                She now has diabetes.  She's on metformin now.  Doing well though.  A1c is controlled.  Still working on weight loss.  Diabetic foot done today.    declines all vaccines.     She has hypothyroidism which is currently over supplemented with 150 mcg.  Willing to reduce her dose further.  Willing to go down to 137 mcg.    Hyperlipidemia currently on a statin therapy.  Within goal ranges for her levels.    Depressions currently controlled followed by Dr. Cifuentes at the mental health clinic in [place].  She did  and  her . On paxil at 60mg and trazadone 50mg at night.  Gets through local pharmacy    Hypertension is controlled with a diuretic. Stable.  Labs reviewed.    Has chronic gastroesophageal reflux disease for which she only tolerates Nexium.  However her insurance will now not pay for the Nexium.  Thus she is willing to try to do omeprazole again.  She has tried and failed Prilosec Prevacid omeprazole Protonix Zantac and Pepcid.    She is needing to update mammogram.  She declines a flu shot today.             Diabetes   She presents for her follow-up diabetic visit. She has type 2 diabetes mellitus. No MedicAlert identification noted. The initial diagnosis of diabetes was made 12 months ago. Pertinent negatives for hypoglycemia include no confusion, hunger, mood changes, nervousness/anxiousness, pallor, seizures, sleepiness, sweats or tremors. Pertinent negatives for diabetes include no blurred vision, no foot paresthesias, no foot ulcerations, no polydipsia, no polyphagia, no polyuria, no visual change and no weight loss. Pertinent negatives for hypoglycemia complications include no blackouts, no nocturnal hypoglycemia, no required assistance and no required glucagon injection. Symptoms are stable. Pertinent negatives for diabetic complications include no autonomic  neuropathy, CVA, heart disease, impotence, nephropathy, peripheral neuropathy, PVD or retinopathy. Risk factors for coronary artery disease include hypertension and obesity. Current diabetic treatment includes oral agent (monotherapy). She is compliant with treatment most of the time. Her weight is stable. She is following a generally healthy diet. Meal planning includes carbohydrate counting. She has not had a previous visit with a dietitian. She participates in exercise three times a week. She monitors blood glucose at home 1-2 x per week. Blood glucose monitoring compliance is good. There is no change in her home blood glucose trend. She does not see a podiatrist.Eye exam is current.       Lab Results   Component Value Date    WBC 8.36 02/14/2020    HGB 14.0 02/14/2020    HCT 45.6 02/14/2020     02/14/2020    CHOL 159 02/14/2020    TRIG 116 02/14/2020    HDL 51 02/14/2020    ALT 18 02/14/2020    AST 21 02/14/2020     02/14/2020    K 4.3 02/14/2020     02/14/2020    CREATININE 1.0 02/14/2020    BUN 10 02/14/2020    CO2 28 02/14/2020    TSH 0.166 (L) 02/14/2020    HGBA1C 5.9 (H) 02/14/2020       Mammo Digital Screening Bilat with CAD  Result:  Mammo Digital Screening Bilat with CAD    History:  Patient is 62 y.o. and is seen for encounter for screening mammogram for   breast cancer.    Films Compared:  Compared to: 03/29/2016 Mammo Digital Screening Bilat with CAD (No   Change), 01/08/2015 Mammo Digital Screening Bilat with CAD (No Change),   and 09/13/2012 US Breast Biopsy Right (No Change)    Findings:  Computer-aided detection was utilized in the interpretation of this   examination.       The breasts have scattered areas of fibroglandular density. There is no   evidence of suspicious masses, microcalcifications or architectural   distortion.    Impression:  No mammographic evidence of malignancy.    BI-RADS Category 1: Negative    Recommendation:  Routine Screening Mammogram in 1 year is  "recommended    The patient's estimated lifetime risk of breast cancer (to age 85) based   on Tyrer-Cuzick - 7 risk assessment model is: Tyrer-Cuzick: 7.66 %.   According to the American Cancer Society,  patients with a lifetime breast   cancer risk of 20% or higher might benefit from supplemental screening   tests.        Review of Systems   Constitutional: Negative for weight loss.   HENT: Negative for dental problem and rhinorrhea.    Eyes: Negative for blurred vision.   Endocrine: Negative for polydipsia, polyphagia and polyuria.   Genitourinary: Negative for impotence.   Skin: Negative for pallor.   Neurological: Negative for tremors and seizures.   Psychiatric/Behavioral: Negative for confusion. The patient is not nervous/anxious.      Objective:     Vitals:    02/17/20 0935   BP: 120/80   Pulse: 70   Temp: 98.4 °F (36.9 °C)   Weight: 101.5 kg (223 lb 12.3 oz)   Height: 5' 4" (1.626 m)     Physical Exam   Neck: Normal range of motion. Neck supple. No thyromegaly present.   Cardiovascular: Normal rate and regular rhythm.   Pulses:       Dorsalis pedis pulses are 2+ on the right side, and 2+ on the left side.   Musculoskeletal:        Right foot: There is normal range of motion and no deformity.        Left foot: There is normal range of motion and no deformity.   Feet:   Right Foot:   Protective Sensation: 4 sites tested. 4 sites sensed.   Skin Integrity: Positive for warmth. Negative for ulcer, blister, skin breakdown, erythema, callus or dry skin.   Left Foot:   Protective Sensation: 4 sites tested. 4 sites sensed.   Skin Integrity: Positive for warmth. Negative for ulcer, blister, skin breakdown, erythema, callus or dry skin.   Vitals reviewed.    Assessment:     1. Mixed diabetic hyperlipidemia associated with type 2 diabetes mellitus    2. Dyslipidemia    3. Essential hypertension    4. Hypothyroidism due to acquired atrophy of thyroid    5. Gastroesophageal reflux disease, esophagitis presence not " specified    6. Recurrent major depression in full remission    7. Controlled type 2 diabetes mellitus without complication, without long-term current use of insulin    8. Severe obesity (BMI 35.0-39.9) with comorbidity    9. Encounter for screening mammogram for breast cancer    10. Routine general medical examination at a health care facility      Plan:   Anupama was seen today for annual exam.    Diagnoses and all orders for this visit:    Mixed diabetic hyperlipidemia associated with type 2 diabetes mellitus-foot exam today.  Continue with metformin continue with diet exercise and weight loss    Dyslipidemia-labs reviewed continue current medications benefitting.  -     lovastatin (MEVACOR) 40 MG tablet; Take 1 tablet (40 mg total) by mouth nightly.    Essential hypertension -labs reviewed continue current medications benefitting.      Hypothyroidism due to acquired atrophy of thyroid- not controlled adjusting dose from 150-137 mcg daily repeat lab work again in 3 months.  Follow-up based on results  -     TSH; Future  -     T4, free; Future    Gastroesophageal reflux disease, esophagitis presence not specified-insurance not covering Nexium will switch her over to omeprazole    Recurrent major depression in full remission followed by mental health clinic in [place] Dr. Berry franco    Controlled type 2 diabetes mellitus without complication, without long-term current use of insulin    Severe obesity (BMI 35.0-39.9) with comorbidity working on weight loss additionally      Other orders  -     levothyroxine (SYNTHROID) 137 MCG Tab tablet; TAKE 1 TABLET  BEFORE BREAKFAST.  -     metFORMIN (GLUCOPHAGE-XR) 500 MG XR 24hr tablet; 4 tab po daily  -     triamterene-hydrochlorothiazide 75-50 mg (MAXZIDE) 75-50 mg per tablet; TAKE 1 TABLET ONE TIME DAILY  -     omeprazole (PRILOSEC) 40 MG capsule; Take 1 capsule (40 mg total) by mouth once daily.            Follow up in about 1 year (around 2/17/2021) for physical with   [facility].    There are no Patient Instructions on file for this visit.

## 2020-02-17 NOTE — PROGRESS NOTES
"Anupama Norman presented for a prevention/wellness visit today. The following components were reviewed and updated:    · Medical history  · Family History  · Social history  · Allergies and Current Medications  · Health Maintenance  · Patient Care Team:  · Kim Krueger MD as PCP - General (Family Medicine)  · Daryl Maxwell III, MD as Consulting Physician (Gastroenterology)  · Beatriz Mcgee LPN as Care Coordinator (Internal Medicine)  · Luis Hollis MD (Ophthalmology)  · Urbano Martin MD (Psychiatry)        Vitals:    02/17/20 0935   BP: 120/80   Pulse: 70   Temp: 98.4 °F (36.9 °C)   Weight: 101.5 kg (223 lb 12.3 oz)   Height: 5' 4" (1.626 m)     Body mass index is 38.41 kg/m².   ]    Review of Systems   Constitutional: Negative for activity change, appetite change, chills, fatigue and fever.   HENT: Negative for ear pain.    Eyes: Negative for pain and visual disturbance.   Respiratory: Negative for cough and shortness of breath.    Cardiovascular: Negative for chest pain and leg swelling.   Gastrointestinal: Negative for abdominal pain, blood in stool, nausea and vomiting.   Endocrine: Negative for cold intolerance and heat intolerance.   Genitourinary: Negative for dysuria and frequency.   Musculoskeletal: Negative for joint swelling, myalgias and neck pain.   Skin: Negative for color change and rash.   Neurological: Negative for dizziness and headaches.   Psychiatric/Behavioral: Negative for behavioral problems, dysphoric mood and sleep disturbance. The patient is not nervous/anxious.      Physical Exam   Constitutional: She is oriented to person, place, and time. She appears well-developed and well-nourished.   HENT:   Head: Normocephalic and atraumatic.   Right Ear: External ear normal.   Left Ear: External ear normal.   Mouth/Throat: Oropharynx is clear and moist.   Eyes: EOM are normal.   Cardiovascular: Exam reveals no gallop and no friction rub.   No murmur heard.  Pulmonary/Chest: " Effort normal. No respiratory distress. She has no wheezes. She has no rales.   Abdominal: Soft. Bowel sounds are normal. She exhibits no distension. There is no tenderness. There is no rebound.   Musculoskeletal: Normal range of motion. She exhibits no edema.   Lymphadenopathy:     She has no cervical adenopathy.   Neurological: She is alert and oriented to person, place, and time.   Skin: Skin is warm and dry. No rash noted.   Psychiatric: She has a normal mood and affect. Her behavior is normal. Judgment and thought content normal.   Vitals reviewed.        Annual Wellness Visit- Z0.00  Routine medical exam. Labs discussed. Setup mammo, declines flu shot. Declines additional injection and labs.   Encounter for screening mammogram for breast cancer  -     Mammo Digital Screening Bilat; Future    Routine general medical examination at a health care facility      Patient Active Problem List   Diagnosis    Recurrent major depression in full remission    Essential hypertension    GERD (gastroesophageal reflux disease)    Mixed diabetic hyperlipidemia associated with type 2 diabetes mellitus    Hypothyroidism due to acquired atrophy of thyroid    Severe obesity (BMI 35.0-39.9) with comorbidity    History of colon polyps    Controlled type 2 diabetes mellitus         Provided Anupama with a 5-10 year written screening schedule and personal prevention plan. Recommendations were developed using the USPSTF age appropriate recommendations. Education, counseling, and referrals were provided as needed.  After Visit Summary printed and given to patient which includes a list of additional screenings\tests needed.    Health Maintenance   Topic Date Due    TETANUS VACCINE  03/19/1975    Mammogram  08/03/2018    Colonoscopy  04/08/2020    Hemoglobin A1c  08/14/2020    Eye Exam  09/24/2020    Lipid Panel  02/14/2021    Urine Microalbumin  02/14/2021    Low Dose Statin  02/17/2021    Foot Exam  02/17/2021     Hepatitis C Screening  Completed    Pneumococcal Vaccine (Medium Risk)  Discontinued       Follow up in about 1 year (around 2/17/2021) for physical with Dr NUNES.      Kim Nunes MD

## 2020-03-04 ENCOUNTER — HOSPITAL ENCOUNTER (OUTPATIENT)
Dept: RADIOLOGY | Facility: HOSPITAL | Age: 65
Discharge: HOME | End: 2020-03-04
Attending: FAMILY MEDICINE
Payer: MEDICARE

## 2020-03-04 VITALS — WEIGHT: 223.75 LBS | BODY MASS INDEX: 38.2 KG/M2 | HEIGHT: 64 IN

## 2020-03-04 DIAGNOSIS — Z12.31 ENCOUNTER FOR SCREENING MAMMOGRAM FOR BREAST CANCER: ICD-10-CM

## 2020-03-04 PROCEDURE — 77067 MAMMO DIGITAL SCREENING BILAT WITH TOMOSYNTHESIS_CAD: ICD-10-PCS | Mod: 26,,, | Performed by: RADIOLOGY

## 2020-03-04 PROCEDURE — 77067 SCR MAMMO BI INCL CAD: CPT | Mod: 26,,, | Performed by: RADIOLOGY

## 2020-03-04 PROCEDURE — 77063 BREAST TOMOSYNTHESIS BI: CPT | Mod: 26,,, | Performed by: RADIOLOGY

## 2020-03-04 PROCEDURE — 77063 MAMMO DIGITAL SCREENING BILAT WITH TOMOSYNTHESIS_CAD: ICD-10-PCS | Mod: 26,,, | Performed by: RADIOLOGY

## 2020-03-04 PROCEDURE — 77067 SCR MAMMO BI INCL CAD: CPT | Mod: TC

## 2020-07-14 ENCOUNTER — LAB VISIT (OUTPATIENT)
Dept: LAB | Facility: HOSPITAL | Age: 65
End: 2020-07-14
Attending: FAMILY MEDICINE
Payer: MEDICARE

## 2020-07-14 DIAGNOSIS — E03.4 HYPOTHYROIDISM DUE TO ACQUIRED ATROPHY OF THYROID: Chronic | ICD-10-CM

## 2020-07-14 PROCEDURE — 84439 ASSAY OF FREE THYROXINE: CPT

## 2020-07-14 PROCEDURE — 36415 COLL VENOUS BLD VENIPUNCTURE: CPT | Mod: PO

## 2020-07-14 PROCEDURE — 84443 ASSAY THYROID STIM HORMONE: CPT

## 2020-07-15 LAB
T4 FREE SERPL-MCNC: 1.49 NG/DL (ref 0.71–1.51)
TSH SERPL DL<=0.005 MIU/L-ACNC: 0.62 UIU/ML (ref 0.4–4)

## 2020-08-28 DIAGNOSIS — E11.9 TYPE 2 DIABETES MELLITUS WITHOUT COMPLICATION: ICD-10-CM

## 2020-10-06 ENCOUNTER — PATIENT MESSAGE (OUTPATIENT)
Dept: ADMINISTRATIVE | Facility: HOSPITAL | Age: 65
End: 2020-10-06

## 2020-12-11 ENCOUNTER — PATIENT MESSAGE (OUTPATIENT)
Dept: OTHER | Facility: OTHER | Age: 65
End: 2020-12-11

## 2021-01-27 ENCOUNTER — OFFICE VISIT (OUTPATIENT)
Dept: PRIMARY CARE CLINIC | Facility: CLINIC | Age: 66
End: 2021-01-27
Payer: MEDICARE

## 2021-01-27 ENCOUNTER — HOSPITAL ENCOUNTER (OUTPATIENT)
Dept: RADIOLOGY | Facility: HOSPITAL | Age: 66
Discharge: HOME | End: 2021-01-27
Attending: FAMILY MEDICINE
Payer: MEDICARE

## 2021-01-27 VITALS
SYSTOLIC BLOOD PRESSURE: 139 MMHG | HEART RATE: 87 BPM | BODY MASS INDEX: 38.05 KG/M2 | WEIGHT: 222.88 LBS | HEIGHT: 64 IN | DIASTOLIC BLOOD PRESSURE: 86 MMHG | TEMPERATURE: 98 F

## 2021-01-27 DIAGNOSIS — E66.01 SEVERE OBESITY (BMI 35.0-39.9) WITH COMORBIDITY: ICD-10-CM

## 2021-01-27 DIAGNOSIS — G89.29 CHRONIC RIGHT SHOULDER PAIN: ICD-10-CM

## 2021-01-27 DIAGNOSIS — E11.59 HYPERTENSION COMPLICATING DIABETES: ICD-10-CM

## 2021-01-27 DIAGNOSIS — I15.2 HYPERTENSION COMPLICATING DIABETES: ICD-10-CM

## 2021-01-27 DIAGNOSIS — M25.511 CHRONIC RIGHT SHOULDER PAIN: ICD-10-CM

## 2021-01-27 DIAGNOSIS — G89.29 CHRONIC RIGHT SHOULDER PAIN: Primary | ICD-10-CM

## 2021-01-27 DIAGNOSIS — Z12.11 COLON CANCER SCREENING: ICD-10-CM

## 2021-01-27 DIAGNOSIS — Z00.00 ROUTINE GENERAL MEDICAL EXAMINATION AT A HEALTH CARE FACILITY: ICD-10-CM

## 2021-01-27 DIAGNOSIS — E11.9 CONTROLLED TYPE 2 DIABETES MELLITUS WITHOUT COMPLICATION, WITHOUT LONG-TERM CURRENT USE OF INSULIN: ICD-10-CM

## 2021-01-27 DIAGNOSIS — F33.42 RECURRENT MAJOR DEPRESSION IN FULL REMISSION: ICD-10-CM

## 2021-01-27 DIAGNOSIS — K21.9 GASTROESOPHAGEAL REFLUX DISEASE, UNSPECIFIED WHETHER ESOPHAGITIS PRESENT: ICD-10-CM

## 2021-01-27 DIAGNOSIS — E03.4 HYPOTHYROIDISM DUE TO ACQUIRED ATROPHY OF THYROID: ICD-10-CM

## 2021-01-27 DIAGNOSIS — E11.69 MIXED DIABETIC HYPERLIPIDEMIA ASSOCIATED WITH TYPE 2 DIABETES MELLITUS: ICD-10-CM

## 2021-01-27 DIAGNOSIS — E78.2 MIXED DIABETIC HYPERLIPIDEMIA ASSOCIATED WITH TYPE 2 DIABETES MELLITUS: ICD-10-CM

## 2021-01-27 DIAGNOSIS — M25.511 CHRONIC RIGHT SHOULDER PAIN: Primary | ICD-10-CM

## 2021-01-27 DIAGNOSIS — E78.5 DYSLIPIDEMIA: ICD-10-CM

## 2021-01-27 PROCEDURE — 3044F PR MOST RECENT HEMOGLOBIN A1C LEVEL <7.0%: ICD-10-PCS | Mod: CPTII,S$GLB,, | Performed by: FAMILY MEDICINE

## 2021-01-27 PROCEDURE — 73030 XR SHOULDER COMPLETE 2 OR MORE VIEWS RIGHT: ICD-10-PCS | Mod: 26,RT,, | Performed by: RADIOLOGY

## 2021-01-27 PROCEDURE — 3075F PR MOST RECENT SYSTOLIC BLOOD PRESS GE 130-139MM HG: ICD-10-PCS | Mod: CPTII,S$GLB,, | Performed by: FAMILY MEDICINE

## 2021-01-27 PROCEDURE — 99999 PR PBB SHADOW E&M-EST. PATIENT-LVL III: CPT | Mod: PBBFAC,,, | Performed by: FAMILY MEDICINE

## 2021-01-27 PROCEDURE — 3075F SYST BP GE 130 - 139MM HG: CPT | Mod: CPTII,S$GLB,, | Performed by: FAMILY MEDICINE

## 2021-01-27 PROCEDURE — 99499 UNLISTED E&M SERVICE: CPT | Mod: S$GLB,,, | Performed by: FAMILY MEDICINE

## 2021-01-27 PROCEDURE — 99499 RISK ADDL DX/OHS AUDIT: ICD-10-PCS | Mod: S$GLB,,, | Performed by: FAMILY MEDICINE

## 2021-01-27 PROCEDURE — 1125F AMNT PAIN NOTED PAIN PRSNT: CPT | Mod: S$GLB,,, | Performed by: FAMILY MEDICINE

## 2021-01-27 PROCEDURE — 1125F PR PAIN SEVERITY QUANTIFIED, PAIN PRESENT: ICD-10-PCS | Mod: S$GLB,,, | Performed by: FAMILY MEDICINE

## 2021-01-27 PROCEDURE — 3079F PR MOST RECENT DIASTOLIC BLOOD PRESSURE 80-89 MM HG: ICD-10-PCS | Mod: CPTII,S$GLB,, | Performed by: FAMILY MEDICINE

## 2021-01-27 PROCEDURE — 3008F BODY MASS INDEX DOCD: CPT | Mod: CPTII,S$GLB,, | Performed by: FAMILY MEDICINE

## 2021-01-27 PROCEDURE — 3044F HG A1C LEVEL LT 7.0%: CPT | Mod: CPTII,S$GLB,, | Performed by: FAMILY MEDICINE

## 2021-01-27 PROCEDURE — 99214 PR OFFICE/OUTPT VISIT, EST, LEVL IV, 30-39 MIN: ICD-10-PCS | Mod: S$GLB,,, | Performed by: FAMILY MEDICINE

## 2021-01-27 PROCEDURE — 3079F DIAST BP 80-89 MM HG: CPT | Mod: CPTII,S$GLB,, | Performed by: FAMILY MEDICINE

## 2021-01-27 PROCEDURE — 73030 X-RAY EXAM OF SHOULDER: CPT | Mod: 26,RT,, | Performed by: RADIOLOGY

## 2021-01-27 PROCEDURE — 99999 PR PBB SHADOW E&M-EST. PATIENT-LVL III: ICD-10-PCS | Mod: PBBFAC,,, | Performed by: FAMILY MEDICINE

## 2021-01-27 PROCEDURE — 99214 OFFICE O/P EST MOD 30 MIN: CPT | Mod: S$GLB,,, | Performed by: FAMILY MEDICINE

## 2021-01-27 PROCEDURE — 73030 X-RAY EXAM OF SHOULDER: CPT | Mod: TC,FY,PO,RT

## 2021-01-27 PROCEDURE — 3008F PR BODY MASS INDEX (BMI) DOCUMENTED: ICD-10-PCS | Mod: CPTII,S$GLB,, | Performed by: FAMILY MEDICINE

## 2021-01-27 RX ORDER — LOVASTATIN 40 MG/1
40 TABLET ORAL NIGHTLY
Qty: 90 TABLET | Refills: 3 | Status: SHIPPED | OUTPATIENT
Start: 2021-01-27 | End: 2022-01-26 | Stop reason: SDUPTHER

## 2021-01-27 RX ORDER — OMEPRAZOLE 40 MG/1
40 CAPSULE, DELAYED RELEASE ORAL DAILY
Qty: 90 CAPSULE | Refills: 3 | Status: SHIPPED | OUTPATIENT
Start: 2021-01-27 | End: 2022-01-26 | Stop reason: SDUPTHER

## 2021-01-27 RX ORDER — MELOXICAM 15 MG/1
15 TABLET ORAL DAILY
Qty: 30 TABLET | Refills: 0 | Status: SHIPPED | OUTPATIENT
Start: 2021-01-27 | End: 2021-07-27

## 2021-01-27 RX ORDER — LEVOTHYROXINE SODIUM 137 UG/1
TABLET ORAL
Qty: 90 TABLET | Refills: 3 | Status: SHIPPED | OUTPATIENT
Start: 2021-01-27 | End: 2021-07-27

## 2021-01-27 RX ORDER — PAROXETINE 10 MG/1
TABLET, FILM COATED ORAL
COMMUNITY
Start: 2020-12-17 | End: 2022-02-14 | Stop reason: SDUPTHER

## 2021-01-27 RX ORDER — TRIAMTERENE AND HYDROCHLOROTHIAZIDE 75; 50 MG/1; MG/1
TABLET ORAL
Qty: 90 TABLET | Refills: 3 | Status: SHIPPED | OUTPATIENT
Start: 2021-01-27 | End: 2022-01-26 | Stop reason: SDUPTHER

## 2021-01-27 RX ORDER — METFORMIN HYDROCHLORIDE 500 MG/1
TABLET, EXTENDED RELEASE ORAL
Qty: 360 TABLET | Refills: 3 | Status: SHIPPED | OUTPATIENT
Start: 2021-01-27 | End: 2022-01-26 | Stop reason: SDUPTHER

## 2021-01-28 ENCOUNTER — TELEPHONE (OUTPATIENT)
Dept: ENDOSCOPY | Facility: HOSPITAL | Age: 66
End: 2021-01-28

## 2021-04-28 ENCOUNTER — PATIENT MESSAGE (OUTPATIENT)
Dept: RESEARCH | Facility: HOSPITAL | Age: 66
End: 2021-04-28

## 2021-05-21 ENCOUNTER — PATIENT MESSAGE (OUTPATIENT)
Dept: PRIMARY CARE CLINIC | Facility: CLINIC | Age: 66
End: 2021-05-21

## 2021-06-29 ENCOUNTER — PATIENT MESSAGE (OUTPATIENT)
Dept: ENDOSCOPY | Facility: HOSPITAL | Age: 66
End: 2021-06-29

## 2021-07-27 ENCOUNTER — OFFICE VISIT (OUTPATIENT)
Dept: PRIMARY CARE CLINIC | Facility: CLINIC | Age: 66
End: 2021-07-27
Payer: MEDICARE

## 2021-07-27 ENCOUNTER — LAB VISIT (OUTPATIENT)
Dept: LAB | Facility: HOSPITAL | Age: 66
End: 2021-07-27
Payer: MEDICARE

## 2021-07-27 VITALS
SYSTOLIC BLOOD PRESSURE: 128 MMHG | WEIGHT: 218.06 LBS | TEMPERATURE: 98 F | BODY MASS INDEX: 37.23 KG/M2 | DIASTOLIC BLOOD PRESSURE: 84 MMHG | HEIGHT: 64 IN | HEART RATE: 74 BPM

## 2021-07-27 DIAGNOSIS — Z12.31 ENCOUNTER FOR SCREENING MAMMOGRAM FOR BREAST CANCER: ICD-10-CM

## 2021-07-27 DIAGNOSIS — Z00.00 ROUTINE GENERAL MEDICAL EXAMINATION AT A HEALTH CARE FACILITY: Primary | ICD-10-CM

## 2021-07-27 DIAGNOSIS — E11.9 CONTROLLED TYPE 2 DIABETES MELLITUS WITHOUT COMPLICATION, WITHOUT LONG-TERM CURRENT USE OF INSULIN: ICD-10-CM

## 2021-07-27 DIAGNOSIS — E78.2 MIXED DIABETIC HYPERLIPIDEMIA ASSOCIATED WITH TYPE 2 DIABETES MELLITUS: ICD-10-CM

## 2021-07-27 DIAGNOSIS — F33.42 RECURRENT MAJOR DEPRESSION IN FULL REMISSION: ICD-10-CM

## 2021-07-27 DIAGNOSIS — E11.59 HYPERTENSION COMPLICATING DIABETES: ICD-10-CM

## 2021-07-27 DIAGNOSIS — E03.4 HYPOTHYROIDISM DUE TO ACQUIRED ATROPHY OF THYROID: ICD-10-CM

## 2021-07-27 DIAGNOSIS — E11.69 MIXED DIABETIC HYPERLIPIDEMIA ASSOCIATED WITH TYPE 2 DIABETES MELLITUS: ICD-10-CM

## 2021-07-27 DIAGNOSIS — Z00.00 ROUTINE GENERAL MEDICAL EXAMINATION AT A HEALTH CARE FACILITY: ICD-10-CM

## 2021-07-27 DIAGNOSIS — I15.2 HYPERTENSION COMPLICATING DIABETES: ICD-10-CM

## 2021-07-27 DIAGNOSIS — Z12.11 COLON CANCER SCREENING: ICD-10-CM

## 2021-07-27 PROCEDURE — 99999 PR PBB SHADOW E&M-EST. PATIENT-LVL IV: ICD-10-PCS | Mod: PBBFAC,,, | Performed by: FAMILY MEDICINE

## 2021-07-27 PROCEDURE — 3079F PR MOST RECENT DIASTOLIC BLOOD PRESSURE 80-89 MM HG: ICD-10-PCS | Mod: CPTII,S$GLB,, | Performed by: FAMILY MEDICINE

## 2021-07-27 PROCEDURE — 1159F PR MEDICATION LIST DOCUMENTED IN MEDICAL RECORD: ICD-10-PCS | Mod: CPTII,S$GLB,, | Performed by: FAMILY MEDICINE

## 2021-07-27 PROCEDURE — 3008F BODY MASS INDEX DOCD: CPT | Mod: CPTII,S$GLB,, | Performed by: FAMILY MEDICINE

## 2021-07-27 PROCEDURE — 3044F PR MOST RECENT HEMOGLOBIN A1C LEVEL <7.0%: ICD-10-PCS | Mod: CPTII,S$GLB,, | Performed by: FAMILY MEDICINE

## 2021-07-27 PROCEDURE — 1160F RVW MEDS BY RX/DR IN RCRD: CPT | Mod: CPTII,S$GLB,, | Performed by: FAMILY MEDICINE

## 2021-07-27 PROCEDURE — 1160F PR REVIEW ALL MEDS BY PRESCRIBER/CLIN PHARMACIST DOCUMENTED: ICD-10-PCS | Mod: CPTII,S$GLB,, | Performed by: FAMILY MEDICINE

## 2021-07-27 PROCEDURE — 99499 RISK ADDL DX/OHS AUDIT: ICD-10-PCS | Mod: S$GLB,,, | Performed by: FAMILY MEDICINE

## 2021-07-27 PROCEDURE — 3074F PR MOST RECENT SYSTOLIC BLOOD PRESSURE < 130 MM HG: ICD-10-PCS | Mod: CPTII,S$GLB,, | Performed by: FAMILY MEDICINE

## 2021-07-27 PROCEDURE — 1159F MED LIST DOCD IN RCRD: CPT | Mod: CPTII,S$GLB,, | Performed by: FAMILY MEDICINE

## 2021-07-27 PROCEDURE — 1126F PR PAIN SEVERITY QUANTIFIED, NO PAIN PRESENT: ICD-10-PCS | Mod: CPTII,S$GLB,, | Performed by: FAMILY MEDICINE

## 2021-07-27 PROCEDURE — 99214 OFFICE O/P EST MOD 30 MIN: CPT | Mod: S$GLB,,, | Performed by: FAMILY MEDICINE

## 2021-07-27 PROCEDURE — 82043 UR ALBUMIN QUANTITATIVE: CPT | Performed by: FAMILY MEDICINE

## 2021-07-27 PROCEDURE — 99214 PR OFFICE/OUTPT VISIT, EST, LEVL IV, 30-39 MIN: ICD-10-PCS | Mod: S$GLB,,, | Performed by: FAMILY MEDICINE

## 2021-07-27 PROCEDURE — 82570 ASSAY OF URINE CREATININE: CPT | Performed by: FAMILY MEDICINE

## 2021-07-27 PROCEDURE — 99499 UNLISTED E&M SERVICE: CPT | Mod: S$GLB,,, | Performed by: FAMILY MEDICINE

## 2021-07-27 PROCEDURE — 1126F AMNT PAIN NOTED NONE PRSNT: CPT | Mod: CPTII,S$GLB,, | Performed by: FAMILY MEDICINE

## 2021-07-27 PROCEDURE — 3074F SYST BP LT 130 MM HG: CPT | Mod: CPTII,S$GLB,, | Performed by: FAMILY MEDICINE

## 2021-07-27 PROCEDURE — 99999 PR PBB SHADOW E&M-EST. PATIENT-LVL IV: CPT | Mod: PBBFAC,,, | Performed by: FAMILY MEDICINE

## 2021-07-27 PROCEDURE — 3044F HG A1C LEVEL LT 7.0%: CPT | Mod: CPTII,S$GLB,, | Performed by: FAMILY MEDICINE

## 2021-07-27 PROCEDURE — 3008F PR BODY MASS INDEX (BMI) DOCUMENTED: ICD-10-PCS | Mod: CPTII,S$GLB,, | Performed by: FAMILY MEDICINE

## 2021-07-27 PROCEDURE — 3079F DIAST BP 80-89 MM HG: CPT | Mod: CPTII,S$GLB,, | Performed by: FAMILY MEDICINE

## 2021-07-27 RX ORDER — LEVOTHYROXINE SODIUM 137 UG/1
TABLET ORAL
Qty: 90 TABLET | Refills: 3
Start: 2021-07-27 | End: 2021-08-10 | Stop reason: SDUPTHER

## 2021-07-28 ENCOUNTER — PATIENT MESSAGE (OUTPATIENT)
Dept: ENDOSCOPY | Facility: HOSPITAL | Age: 66
End: 2021-07-28

## 2021-07-28 LAB
ALBUMIN/CREAT UR: 9.1 UG/MG (ref 0–30)
CREAT UR-MCNC: 154 MG/DL (ref 15–325)
MICROALBUMIN UR DL<=1MG/L-MCNC: 14 UG/ML

## 2021-07-28 RX ORDER — SOD SULF/POT CHLORIDE/MAG SULF 1.479 G
12 TABLET ORAL DAILY
Qty: 24 TABLET | Refills: 0 | Status: SHIPPED | OUTPATIENT
Start: 2021-07-28 | End: 2021-12-03

## 2021-07-29 ENCOUNTER — IMMUNIZATION (OUTPATIENT)
Dept: PRIMARY CARE CLINIC | Facility: CLINIC | Age: 66
End: 2021-07-29
Payer: MEDICARE

## 2021-07-29 ENCOUNTER — HOSPITAL ENCOUNTER (OUTPATIENT)
Dept: RADIOLOGY | Facility: HOSPITAL | Age: 66
Discharge: HOME | End: 2021-07-29
Attending: FAMILY MEDICINE
Payer: MEDICARE

## 2021-07-29 VITALS — HEIGHT: 64 IN | BODY MASS INDEX: 37.23 KG/M2 | WEIGHT: 218.06 LBS

## 2021-07-29 DIAGNOSIS — Z23 NEED FOR VACCINATION: Primary | ICD-10-CM

## 2021-07-29 DIAGNOSIS — Z12.31 ENCOUNTER FOR SCREENING MAMMOGRAM FOR BREAST CANCER: ICD-10-CM

## 2021-07-29 PROCEDURE — 77067 MAMMO DIGITAL SCREENING BILAT WITH TOMO: ICD-10-PCS | Mod: 26,,, | Performed by: RADIOLOGY

## 2021-07-29 PROCEDURE — 77067 SCR MAMMO BI INCL CAD: CPT | Mod: 26,,, | Performed by: RADIOLOGY

## 2021-07-29 PROCEDURE — 91300 COVID-19, MRNA, LNP-S, PF, 30 MCG/0.3 ML DOSE VACCINE: CPT | Mod: PBBFAC | Performed by: FAMILY MEDICINE

## 2021-07-29 PROCEDURE — 77067 SCR MAMMO BI INCL CAD: CPT | Mod: TC

## 2021-07-29 PROCEDURE — 77063 MAMMO DIGITAL SCREENING BILAT WITH TOMO: ICD-10-PCS | Mod: 26,,, | Performed by: RADIOLOGY

## 2021-07-29 PROCEDURE — 77063 BREAST TOMOSYNTHESIS BI: CPT | Mod: 26,,, | Performed by: RADIOLOGY

## 2021-08-10 ENCOUNTER — PATIENT MESSAGE (OUTPATIENT)
Dept: PRIMARY CARE CLINIC | Facility: CLINIC | Age: 66
End: 2021-08-10

## 2021-08-10 ENCOUNTER — PATIENT MESSAGE (OUTPATIENT)
Dept: PHARMACY | Facility: CLINIC | Age: 66
End: 2021-08-10

## 2021-08-10 RX ORDER — LEVOTHYROXINE SODIUM 137 UG/1
TABLET ORAL
Qty: 90 TABLET | Refills: 3 | Status: SHIPPED | OUTPATIENT
Start: 2021-08-10 | End: 2022-01-26 | Stop reason: SDUPTHER

## 2021-08-11 ENCOUNTER — PATIENT MESSAGE (OUTPATIENT)
Dept: PRIMARY CARE CLINIC | Facility: CLINIC | Age: 66
End: 2021-08-11

## 2021-08-19 ENCOUNTER — IMMUNIZATION (OUTPATIENT)
Dept: PRIMARY CARE CLINIC | Facility: CLINIC | Age: 66
End: 2021-08-19
Payer: MEDICARE

## 2021-08-19 DIAGNOSIS — Z23 NEED FOR VACCINATION: Primary | ICD-10-CM

## 2021-08-19 PROCEDURE — 0002A COVID-19, MRNA, LNP-S, PF, 30 MCG/0.3 ML DOSE VACCINE: CPT | Mod: CV19,,, | Performed by: FAMILY MEDICINE

## 2021-08-19 PROCEDURE — 0002A COVID-19, MRNA, LNP-S, PF, 30 MCG/0.3 ML DOSE VACCINE: ICD-10-PCS | Mod: CV19,,, | Performed by: FAMILY MEDICINE

## 2021-08-19 PROCEDURE — 91300 COVID-19, MRNA, LNP-S, PF, 30 MCG/0.3 ML DOSE VACCINE: CPT | Mod: ,,, | Performed by: FAMILY MEDICINE

## 2021-08-19 PROCEDURE — 91300 COVID-19, MRNA, LNP-S, PF, 30 MCG/0.3 ML DOSE VACCINE: ICD-10-PCS | Mod: ,,, | Performed by: FAMILY MEDICINE

## 2021-08-24 ENCOUNTER — OFFICE VISIT (OUTPATIENT)
Dept: URGENT CARE | Facility: CLINIC | Age: 66
End: 2021-08-24
Payer: MEDICARE

## 2021-08-24 VITALS
WEIGHT: 218 LBS | HEART RATE: 75 BPM | HEIGHT: 64 IN | DIASTOLIC BLOOD PRESSURE: 66 MMHG | SYSTOLIC BLOOD PRESSURE: 141 MMHG | BODY MASS INDEX: 37.22 KG/M2 | RESPIRATION RATE: 16 BRPM | TEMPERATURE: 99 F | OXYGEN SATURATION: 98 %

## 2021-08-24 DIAGNOSIS — U07.1 COVID-19: Primary | ICD-10-CM

## 2021-08-24 LAB
CTP QC/QA: YES
SARS-COV-2 RDRP RESP QL NAA+PROBE: POSITIVE

## 2021-08-24 PROCEDURE — 3008F BODY MASS INDEX DOCD: CPT | Mod: CPTII,S$GLB,, | Performed by: PHYSICIAN ASSISTANT

## 2021-08-24 PROCEDURE — 3077F PR MOST RECENT SYSTOLIC BLOOD PRESSURE >= 140 MM HG: ICD-10-PCS | Mod: CPTII,S$GLB,, | Performed by: PHYSICIAN ASSISTANT

## 2021-08-24 PROCEDURE — U0002: ICD-10-PCS | Mod: QW,S$GLB,, | Performed by: PHYSICIAN ASSISTANT

## 2021-08-24 PROCEDURE — 99213 OFFICE O/P EST LOW 20 MIN: CPT | Mod: S$GLB,,, | Performed by: PHYSICIAN ASSISTANT

## 2021-08-24 PROCEDURE — 99213 PR OFFICE/OUTPT VISIT, EST, LEVL III, 20-29 MIN: ICD-10-PCS | Mod: S$GLB,,, | Performed by: PHYSICIAN ASSISTANT

## 2021-08-24 PROCEDURE — 3044F PR MOST RECENT HEMOGLOBIN A1C LEVEL <7.0%: ICD-10-PCS | Mod: CPTII,S$GLB,, | Performed by: PHYSICIAN ASSISTANT

## 2021-08-24 PROCEDURE — 3078F DIAST BP <80 MM HG: CPT | Mod: CPTII,S$GLB,, | Performed by: PHYSICIAN ASSISTANT

## 2021-08-24 PROCEDURE — 3008F PR BODY MASS INDEX (BMI) DOCUMENTED: ICD-10-PCS | Mod: CPTII,S$GLB,, | Performed by: PHYSICIAN ASSISTANT

## 2021-08-24 PROCEDURE — 1125F PR PAIN SEVERITY QUANTIFIED, PAIN PRESENT: ICD-10-PCS | Mod: CPTII,S$GLB,, | Performed by: PHYSICIAN ASSISTANT

## 2021-08-24 PROCEDURE — 3078F PR MOST RECENT DIASTOLIC BLOOD PRESSURE < 80 MM HG: ICD-10-PCS | Mod: CPTII,S$GLB,, | Performed by: PHYSICIAN ASSISTANT

## 2021-08-24 PROCEDURE — 1125F AMNT PAIN NOTED PAIN PRSNT: CPT | Mod: CPTII,S$GLB,, | Performed by: PHYSICIAN ASSISTANT

## 2021-08-24 PROCEDURE — 1159F MED LIST DOCD IN RCRD: CPT | Mod: CPTII,S$GLB,, | Performed by: PHYSICIAN ASSISTANT

## 2021-08-24 PROCEDURE — U0002 COVID-19 LAB TEST NON-CDC: HCPCS | Mod: QW,S$GLB,, | Performed by: PHYSICIAN ASSISTANT

## 2021-08-24 PROCEDURE — 1160F PR REVIEW ALL MEDS BY PRESCRIBER/CLIN PHARMACIST DOCUMENTED: ICD-10-PCS | Mod: CPTII,S$GLB,, | Performed by: PHYSICIAN ASSISTANT

## 2021-08-24 PROCEDURE — 3044F HG A1C LEVEL LT 7.0%: CPT | Mod: CPTII,S$GLB,, | Performed by: PHYSICIAN ASSISTANT

## 2021-08-24 PROCEDURE — 3077F SYST BP >= 140 MM HG: CPT | Mod: CPTII,S$GLB,, | Performed by: PHYSICIAN ASSISTANT

## 2021-08-24 PROCEDURE — 1160F RVW MEDS BY RX/DR IN RCRD: CPT | Mod: CPTII,S$GLB,, | Performed by: PHYSICIAN ASSISTANT

## 2021-08-24 PROCEDURE — 1159F PR MEDICATION LIST DOCUMENTED IN MEDICAL RECORD: ICD-10-PCS | Mod: CPTII,S$GLB,, | Performed by: PHYSICIAN ASSISTANT

## 2021-08-25 ENCOUNTER — INFUSION (OUTPATIENT)
Dept: INFECTIOUS DISEASES | Facility: HOSPITAL | Age: 66
End: 2021-08-25
Attending: NURSE PRACTITIONER
Payer: MEDICARE

## 2021-08-25 VITALS
SYSTOLIC BLOOD PRESSURE: 154 MMHG | RESPIRATION RATE: 18 BRPM | TEMPERATURE: 97 F | DIASTOLIC BLOOD PRESSURE: 75 MMHG | HEART RATE: 68 BPM | OXYGEN SATURATION: 98 %

## 2021-08-25 DIAGNOSIS — U07.1 COVID-19: Primary | ICD-10-CM

## 2021-08-25 PROCEDURE — M0243 CASIRIVI AND IMDEVI INFUSION: HCPCS | Performed by: INTERNAL MEDICINE

## 2021-08-25 PROCEDURE — 63600175 PHARM REV CODE 636 W HCPCS: Performed by: INTERNAL MEDICINE

## 2021-08-25 PROCEDURE — 25000003 PHARM REV CODE 250: Performed by: INTERNAL MEDICINE

## 2021-08-25 RX ORDER — ACETAMINOPHEN 325 MG/1
650 TABLET ORAL ONCE AS NEEDED
Status: DISCONTINUED | OUTPATIENT
Start: 2021-08-25 | End: 2022-02-14

## 2021-08-25 RX ORDER — ONDANSETRON 4 MG/1
4 TABLET, ORALLY DISINTEGRATING ORAL ONCE AS NEEDED
Status: DISCONTINUED | OUTPATIENT
Start: 2021-08-25 | End: 2022-02-14

## 2021-08-25 RX ORDER — DIPHENHYDRAMINE HYDROCHLORIDE 50 MG/ML
25 INJECTION INTRAMUSCULAR; INTRAVENOUS ONCE AS NEEDED
Status: DISCONTINUED | OUTPATIENT
Start: 2021-08-25 | End: 2022-02-14

## 2021-08-25 RX ORDER — ALBUTEROL SULFATE 90 UG/1
2 AEROSOL, METERED RESPIRATORY (INHALATION)
Status: DISCONTINUED | OUTPATIENT
Start: 2021-08-25 | End: 2022-02-14

## 2021-08-25 RX ORDER — SODIUM CHLORIDE 0.9 % (FLUSH) 0.9 %
10 SYRINGE (ML) INJECTION
Status: DISCONTINUED | OUTPATIENT
Start: 2021-08-25 | End: 2022-02-14

## 2021-08-25 RX ORDER — EPINEPHRINE 0.3 MG/.3ML
0.3 INJECTION SUBCUTANEOUS
Status: DISCONTINUED | OUTPATIENT
Start: 2021-08-25 | End: 2022-02-14

## 2021-08-25 RX ADMIN — CASIRIVIMAB AND IMDEVIMAB 600 MG: 600; 600 INJECTION, SOLUTION, CONCENTRATE INTRAVENOUS at 09:08

## 2021-08-26 ENCOUNTER — TELEPHONE (OUTPATIENT)
Dept: PRIMARY CARE CLINIC | Facility: CLINIC | Age: 66
End: 2021-08-26

## 2021-08-26 ENCOUNTER — PATIENT MESSAGE (OUTPATIENT)
Dept: PRIMARY CARE CLINIC | Facility: CLINIC | Age: 66
End: 2021-08-26

## 2021-08-27 ENCOUNTER — TELEPHONE (OUTPATIENT)
Dept: URGENT CARE | Facility: CLINIC | Age: 66
End: 2021-08-27

## 2021-09-13 ENCOUNTER — TELEPHONE (OUTPATIENT)
Dept: PREADMISSION TESTING | Facility: HOSPITAL | Age: 66
End: 2021-09-13

## 2021-09-13 DIAGNOSIS — Z01.818 PRE-OP TESTING: ICD-10-CM

## 2021-09-13 RX ORDER — SODIUM, POTASSIUM,MAG SULFATES 17.5-3.13G
1 SOLUTION, RECONSTITUTED, ORAL ORAL DAILY
Qty: 1 KIT | Refills: 0 | Status: CANCELLED | OUTPATIENT
Start: 2021-09-13 | End: 2021-09-15

## 2021-09-14 ENCOUNTER — TELEPHONE (OUTPATIENT)
Dept: PREADMISSION TESTING | Facility: HOSPITAL | Age: 66
End: 2021-09-14

## 2021-09-14 DIAGNOSIS — Z01.818 PRE-OP TESTING: ICD-10-CM

## 2021-09-17 ENCOUNTER — TELEPHONE (OUTPATIENT)
Dept: PREADMISSION TESTING | Facility: HOSPITAL | Age: 66
End: 2021-09-17

## 2021-10-18 ENCOUNTER — TELEPHONE (OUTPATIENT)
Dept: ADMINISTRATIVE | Facility: HOSPITAL | Age: 66
End: 2021-10-18

## 2021-10-28 ENCOUNTER — TELEPHONE (OUTPATIENT)
Dept: ADMINISTRATIVE | Facility: HOSPITAL | Age: 66
End: 2021-10-28
Payer: MEDICARE

## 2021-11-18 ENCOUNTER — OFFICE VISIT (OUTPATIENT)
Dept: INTERNAL MEDICINE | Facility: CLINIC | Age: 66
End: 2021-11-18
Payer: MEDICARE

## 2021-11-18 VITALS
WEIGHT: 216.94 LBS | BODY MASS INDEX: 37.04 KG/M2 | TEMPERATURE: 98 F | HEIGHT: 64 IN | SYSTOLIC BLOOD PRESSURE: 122 MMHG | DIASTOLIC BLOOD PRESSURE: 80 MMHG | HEART RATE: 78 BPM | RESPIRATION RATE: 18 BRPM

## 2021-11-18 DIAGNOSIS — K21.9 GASTROESOPHAGEAL REFLUX DISEASE, UNSPECIFIED WHETHER ESOPHAGITIS PRESENT: ICD-10-CM

## 2021-11-18 DIAGNOSIS — I10 ESSENTIAL HYPERTENSION: Chronic | ICD-10-CM

## 2021-11-18 DIAGNOSIS — E66.01 SEVERE OBESITY (BMI 35.0-39.9) WITH COMORBIDITY: ICD-10-CM

## 2021-11-18 DIAGNOSIS — Z86.010 HISTORY OF COLON POLYPS: ICD-10-CM

## 2021-11-18 DIAGNOSIS — E11.9 CONTROLLED TYPE 2 DIABETES MELLITUS WITHOUT COMPLICATION, WITHOUT LONG-TERM CURRENT USE OF INSULIN: ICD-10-CM

## 2021-11-18 DIAGNOSIS — E78.2 MIXED DIABETIC HYPERLIPIDEMIA ASSOCIATED WITH TYPE 2 DIABETES MELLITUS: Chronic | ICD-10-CM

## 2021-11-18 DIAGNOSIS — Z00.00 ENCOUNTER FOR PREVENTIVE HEALTH EXAMINATION: Primary | ICD-10-CM

## 2021-11-18 DIAGNOSIS — F33.42 RECURRENT MAJOR DEPRESSION IN FULL REMISSION: Chronic | ICD-10-CM

## 2021-11-18 DIAGNOSIS — E03.4 HYPOTHYROIDISM DUE TO ACQUIRED ATROPHY OF THYROID: Chronic | ICD-10-CM

## 2021-11-18 DIAGNOSIS — E11.69 MIXED DIABETIC HYPERLIPIDEMIA ASSOCIATED WITH TYPE 2 DIABETES MELLITUS: Chronic | ICD-10-CM

## 2021-11-18 PROBLEM — Z74.09 OTHER REDUCED MOBILITY: Status: ACTIVE | Noted: 2021-11-18

## 2021-11-18 PROBLEM — Z74.09 OTHER REDUCED MOBILITY: Status: RESOLVED | Noted: 2021-11-18 | Resolved: 2021-11-18

## 2021-11-18 PROCEDURE — 3008F BODY MASS INDEX DOCD: CPT | Mod: CPTII,S$GLB,, | Performed by: NURSE PRACTITIONER

## 2021-11-18 PROCEDURE — 99499 UNLISTED E&M SERVICE: CPT | Mod: S$GLB,,, | Performed by: NURSE PRACTITIONER

## 2021-11-18 PROCEDURE — 3074F SYST BP LT 130 MM HG: CPT | Mod: CPTII,S$GLB,, | Performed by: NURSE PRACTITIONER

## 2021-11-18 PROCEDURE — 1159F PR MEDICATION LIST DOCUMENTED IN MEDICAL RECORD: ICD-10-PCS | Mod: CPTII,S$GLB,, | Performed by: NURSE PRACTITIONER

## 2021-11-18 PROCEDURE — 3044F HG A1C LEVEL LT 7.0%: CPT | Mod: CPTII,S$GLB,, | Performed by: NURSE PRACTITIONER

## 2021-11-18 PROCEDURE — 3066F NEPHROPATHY DOC TX: CPT | Mod: CPTII,S$GLB,, | Performed by: NURSE PRACTITIONER

## 2021-11-18 PROCEDURE — 1160F PR REVIEW ALL MEDS BY PRESCRIBER/CLIN PHARMACIST DOCUMENTED: ICD-10-PCS | Mod: CPTII,S$GLB,, | Performed by: NURSE PRACTITIONER

## 2021-11-18 PROCEDURE — 3074F PR MOST RECENT SYSTOLIC BLOOD PRESSURE < 130 MM HG: ICD-10-PCS | Mod: CPTII,S$GLB,, | Performed by: NURSE PRACTITIONER

## 2021-11-18 PROCEDURE — 3044F PR MOST RECENT HEMOGLOBIN A1C LEVEL <7.0%: ICD-10-PCS | Mod: CPTII,S$GLB,, | Performed by: NURSE PRACTITIONER

## 2021-11-18 PROCEDURE — 99499 RISK ADDL DX/OHS AUDIT: ICD-10-PCS | Mod: S$GLB,,, | Performed by: NURSE PRACTITIONER

## 2021-11-18 PROCEDURE — 1160F RVW MEDS BY RX/DR IN RCRD: CPT | Mod: CPTII,S$GLB,, | Performed by: NURSE PRACTITIONER

## 2021-11-18 PROCEDURE — G0439 PR MEDICARE ANNUAL WELLNESS SUBSEQUENT VISIT: ICD-10-PCS | Mod: S$GLB,,, | Performed by: NURSE PRACTITIONER

## 2021-11-18 PROCEDURE — 99999 PR PBB SHADOW E&M-EST. PATIENT-LVL V: CPT | Mod: PBBFAC,,, | Performed by: NURSE PRACTITIONER

## 2021-11-18 PROCEDURE — 3061F NEG MICROALBUMINURIA REV: CPT | Mod: CPTII,S$GLB,, | Performed by: NURSE PRACTITIONER

## 2021-11-18 PROCEDURE — 3008F PR BODY MASS INDEX (BMI) DOCUMENTED: ICD-10-PCS | Mod: CPTII,S$GLB,, | Performed by: NURSE PRACTITIONER

## 2021-11-18 PROCEDURE — 3079F DIAST BP 80-89 MM HG: CPT | Mod: CPTII,S$GLB,, | Performed by: NURSE PRACTITIONER

## 2021-11-18 PROCEDURE — 3079F PR MOST RECENT DIASTOLIC BLOOD PRESSURE 80-89 MM HG: ICD-10-PCS | Mod: CPTII,S$GLB,, | Performed by: NURSE PRACTITIONER

## 2021-11-18 PROCEDURE — 3061F PR NEG MICROALBUMINURIA RESULT DOCUMENTED/REVIEW: ICD-10-PCS | Mod: CPTII,S$GLB,, | Performed by: NURSE PRACTITIONER

## 2021-11-18 PROCEDURE — 1159F MED LIST DOCD IN RCRD: CPT | Mod: CPTII,S$GLB,, | Performed by: NURSE PRACTITIONER

## 2021-11-18 PROCEDURE — 3066F PR DOCUMENTATION OF TREATMENT FOR NEPHROPATHY: ICD-10-PCS | Mod: CPTII,S$GLB,, | Performed by: NURSE PRACTITIONER

## 2021-11-18 PROCEDURE — 99999 PR PBB SHADOW E&M-EST. PATIENT-LVL V: ICD-10-PCS | Mod: PBBFAC,,, | Performed by: NURSE PRACTITIONER

## 2021-11-18 PROCEDURE — G0439 PPPS, SUBSEQ VISIT: HCPCS | Mod: S$GLB,,, | Performed by: NURSE PRACTITIONER

## 2021-12-03 ENCOUNTER — PATIENT MESSAGE (OUTPATIENT)
Dept: ENDOSCOPY | Facility: HOSPITAL | Age: 66
End: 2021-12-03
Payer: MEDICARE

## 2021-12-03 RX ORDER — SOD SULF/POT CHLORIDE/MAG SULF 1.479 G
12 TABLET ORAL DAILY
Qty: 24 TABLET | Refills: 0 | Status: SHIPPED | OUTPATIENT
Start: 2021-12-03 | End: 2023-04-11

## 2022-01-07 ENCOUNTER — TELEPHONE (OUTPATIENT)
Dept: PREADMISSION TESTING | Facility: HOSPITAL | Age: 67
End: 2022-01-07

## 2022-01-07 DIAGNOSIS — Z01.818 PRE-OP TESTING: ICD-10-CM

## 2022-01-13 ENCOUNTER — TELEPHONE (OUTPATIENT)
Dept: PREADMISSION TESTING | Facility: HOSPITAL | Age: 67
End: 2022-01-13
Payer: MEDICARE

## 2022-01-13 NOTE — PRE-PROCEDURE INSTRUCTIONS
PAT call completed.  Patient educated on procedure instructions.  Medical history discussed and patient informed of arrival time of 9:00 AM on Tuesday, January 18, 2022 at the [facility], and was made aware of the limited-visitor policy, and that  is to remain during the entire visit.  All questions and concerns addressed.  Endoscopy instructions reviewed. Instructed no solid food, only clear liquids, the day before the procedure, then at 6 PM, the day before the procedure, drink the first half of the bowel prep, then at 2 AM, the morning of procedure, drink the second half of the prep, then do not eat or drink anything until after the procedure.  Instructed also to hold her Metformin, and to monitor her blood sugar closely.  Rapid pre-procedure covid testing to be performed the morning of procedure.   Patient verbalized understanding of all instructions.

## 2022-01-13 NOTE — TELEPHONE ENCOUNTER
PAT call completed.  Patient educated on procedure instructions.  Medical history discussed and patient informed of arrival time of 9:00 AM on Tuesday, January 18, 2022 at the [facility], and was made aware of the limited-visitor policy, and that  is to remain during the entire visit.  All questions and concerns addressed.  Endoscopy instructions reviewed. Instructed no solid food, only clear liquids, the day before the procedure, then at 6 PM, the day before the procedure, drink the first half of the bowel prep, then at 2 AM, the morning of procedure, drink the second half of the prep, then do not eat or drink anything until after the procedure.  Instructed also to hold her Metformin, and to monitor her blood sugar closely.  Rapid pre-procedure covid testing to be performed the morning of procedure.   Patient verbalized understanding of all instructions.

## 2022-01-14 ENCOUNTER — ANESTHESIA EVENT (OUTPATIENT)
Dept: ENDOSCOPY | Facility: HOSPITAL | Age: 67
End: 2022-01-14
Payer: MEDICARE

## 2022-01-14 NOTE — ANESTHESIA PREPROCEDURE EVALUATION
"                                                                                                             01/14/2022  Anupama Olivo is a 66 y.o., female.  Past Medical History:   Diagnosis Date    Anxiety     Diabetes mellitus     Pre Diabetes    GERD (gastroesophageal reflux disease)     HLD (hyperlipidemia)     HTN (hypertension)     Hypothyroid     Insulin resistance     MDD (major depressive disorder)     Obesity     Type 2 diabetes mellitus      Past Surgical History:   Procedure Laterality Date    APPENDECTOMY  1982    BARTHOLIN GLAND CYST EXCISION Left 05/12/2010    Dr ABDOULAYE Fang    BREAST BIOPSY Right     20 years ago. benign.    CATARACT EXTRACTION EXTRACAPSULAR W/ INTRAOCULAR LENS IMPLANTATION Bilateral     CHOLECYSTECTOMY  11/12/2014    lap choly for biliary dyskinesia    HYSTERECTOMY      OOPHORECTOMY      ORIF WRIST FRACTURE  2009    comminuted intraarticular fx of distal Lt radius w/assoc avulsion fx styloid process of ulna - "metalplate    rectocele   repair           Anesthesia Evaluation    I have reviewed the Patient Summary Reports.    I have reviewed the Nursing Notes. I have reviewed the NPO Status.   I have reviewed the Medications.     Review of Systems  Cardiovascular:   Hypertension    Hepatic/GI:   GERD    Endocrine:   Diabetes Hypothyroidism    Psych:   Psychiatric History depression          Physical Exam  General:  Morbid Obesity                 Anesthesia Plan  Type of Anesthesia, risks & benefits discussed:  Anesthesia Type:  general    Patient's Preference:   Plan Factors:          Intra-op Monitoring Plan: standard ASA monitors  Intra-op Monitoring Plan Comments:   Post Op Pain Control Plan: per primary service following discharge from PACU  Post Op Pain Control Plan Comments:     Induction:   IV  Beta Blocker:  Patient is not currently on a Beta-Blocker (No further documentation required).       Informed Consent: Patient understands risks and agrees with " Anesthesia plan.  Questions answered. Anesthesia consent signed with patient.  ASA Score: 3     Day of Surgery Review of History & Physical:    H&P update referred to the provider.         Ready For Surgery From Anesthesia Perspective.

## 2022-01-18 ENCOUNTER — ANESTHESIA (OUTPATIENT)
Dept: ENDOSCOPY | Facility: HOSPITAL | Age: 67
End: 2022-01-18
Payer: MEDICARE

## 2022-01-18 ENCOUNTER — HOSPITAL ENCOUNTER (OUTPATIENT)
Facility: HOSPITAL | Age: 67
Discharge: HOME | End: 2022-01-18
Attending: INTERNAL MEDICINE | Admitting: INTERNAL MEDICINE
Payer: MEDICARE

## 2022-01-18 VITALS
DIASTOLIC BLOOD PRESSURE: 65 MMHG | RESPIRATION RATE: 17 BRPM | BODY MASS INDEX: 36.19 KG/M2 | OXYGEN SATURATION: 100 % | HEIGHT: 64 IN | SYSTOLIC BLOOD PRESSURE: 130 MMHG | WEIGHT: 212 LBS | TEMPERATURE: 97 F | HEART RATE: 65 BPM

## 2022-01-18 DIAGNOSIS — Z86.010 HISTORY OF COLON POLYPS: Primary | ICD-10-CM

## 2022-01-18 LAB
POCT GLUCOSE: 111 MG/DL (ref 70–110)
SARS-COV-2 RNA RESP QL NAA+PROBE: NEGATIVE

## 2022-01-18 PROCEDURE — 45385 PR COLONOSCOPY,REMV LESN,SNARE: ICD-10-PCS | Mod: PT,,, | Performed by: INTERNAL MEDICINE

## 2022-01-18 PROCEDURE — 82962 GLUCOSE BLOOD TEST: CPT | Performed by: INTERNAL MEDICINE

## 2022-01-18 PROCEDURE — 63600175 PHARM REV CODE 636 W HCPCS: Performed by: INTERNAL MEDICINE

## 2022-01-18 PROCEDURE — 45385 COLONOSCOPY W/LESION REMOVAL: CPT | Mod: PT,,, | Performed by: INTERNAL MEDICINE

## 2022-01-18 PROCEDURE — D9220A PRA ANESTHESIA: ICD-10-PCS | Mod: PT,CRNA,, | Performed by: NURSE ANESTHETIST, CERTIFIED REGISTERED

## 2022-01-18 PROCEDURE — 37000009 HC ANESTHESIA EA ADD 15 MINS: Performed by: INTERNAL MEDICINE

## 2022-01-18 PROCEDURE — 63600175 PHARM REV CODE 636 W HCPCS: Performed by: NURSE ANESTHETIST, CERTIFIED REGISTERED

## 2022-01-18 PROCEDURE — 25000003 PHARM REV CODE 250: Performed by: NURSE ANESTHETIST, CERTIFIED REGISTERED

## 2022-01-18 PROCEDURE — 88305 TISSUE EXAM BY PATHOLOGIST: CPT | Mod: 26,,, | Performed by: PATHOLOGY

## 2022-01-18 PROCEDURE — D9220A PRA ANESTHESIA: Mod: PT,ANES,, | Performed by: ANESTHESIOLOGY

## 2022-01-18 PROCEDURE — D9220A PRA ANESTHESIA: Mod: PT,CRNA,, | Performed by: NURSE ANESTHETIST, CERTIFIED REGISTERED

## 2022-01-18 PROCEDURE — 88305 TISSUE EXAM BY PATHOLOGIST: ICD-10-PCS | Mod: 26,,, | Performed by: PATHOLOGY

## 2022-01-18 PROCEDURE — D9220A PRA ANESTHESIA: ICD-10-PCS | Mod: PT,ANES,, | Performed by: ANESTHESIOLOGY

## 2022-01-18 PROCEDURE — 88305 TISSUE EXAM BY PATHOLOGIST: CPT | Performed by: PATHOLOGY

## 2022-01-18 PROCEDURE — 37000008 HC ANESTHESIA 1ST 15 MINUTES: Performed by: INTERNAL MEDICINE

## 2022-01-18 PROCEDURE — 27201089 HC SNARE, DISP (ANY): Performed by: INTERNAL MEDICINE

## 2022-01-18 PROCEDURE — 45385 COLONOSCOPY W/LESION REMOVAL: CPT | Mod: PT | Performed by: INTERNAL MEDICINE

## 2022-01-18 RX ORDER — PROPOFOL 10 MG/ML
VIAL (ML) INTRAVENOUS
Status: DISCONTINUED | OUTPATIENT
Start: 2022-01-18 | End: 2022-01-18

## 2022-01-18 RX ORDER — SODIUM CHLORIDE, SODIUM LACTATE, POTASSIUM CHLORIDE, CALCIUM CHLORIDE 600; 310; 30; 20 MG/100ML; MG/100ML; MG/100ML; MG/100ML
INJECTION, SOLUTION INTRAVENOUS CONTINUOUS
Status: DISCONTINUED | OUTPATIENT
Start: 2022-01-18 | End: 2022-01-18 | Stop reason: HOSPADM

## 2022-01-18 RX ORDER — LIDOCAINE HYDROCHLORIDE 20 MG/ML
INJECTION, SOLUTION EPIDURAL; INFILTRATION; INTRACAUDAL; PERINEURAL
Status: DISCONTINUED | OUTPATIENT
Start: 2022-01-18 | End: 2022-01-18

## 2022-01-18 RX ADMIN — SODIUM CHLORIDE, SODIUM LACTATE, POTASSIUM CHLORIDE, AND CALCIUM CHLORIDE: 600; 310; 30; 20 INJECTION, SOLUTION INTRAVENOUS at 11:01

## 2022-01-18 RX ADMIN — PROPOFOL 20 MG: 10 INJECTION, EMULSION INTRAVENOUS at 11:01

## 2022-01-18 RX ADMIN — PROPOFOL 30 MG: 10 INJECTION, EMULSION INTRAVENOUS at 11:01

## 2022-01-18 RX ADMIN — LIDOCAINE HYDROCHLORIDE 20 MG: 20 INJECTION, SOLUTION EPIDURAL; INFILTRATION; INTRACAUDAL; PERINEURAL at 11:01

## 2022-01-18 NOTE — PLAN OF CARE
Discharge instructions reviewed with pt and family, handouts given, verbalized understanding with no further questions at this time. Dr. Otoole spoke to pt at bedside, reviewed procedure and answered questions aware they are awaiting biopsy results with MD telephone number provided per AVS sheet. VSS on LA, no pain or nausea noted, tolerating po fluids without difficulty, no other complaints noted. Fall precautions reviewed, consents in chart, PIV to be removed at discharge.

## 2022-01-18 NOTE — PROVATION PATIENT INSTRUCTIONS
Discharge Summary/Instructions after an Endoscopic Procedure  Patient Name: Anupama Olivo  Patient MRN: [id number]  Patient YOB: 1955  Tuesday, January 18, 2022  Aliyah Otoole MD  Dear patient,  As a result of recent federal legislation (The Federal Cures Act), you may   receive lab or pathology results from your procedure in your [facility]   account before your physician is able to contact you. Your physician or   their representative will relay the results to you with their   recommendations at their soonest availability.  Thank you,  RESTRICTIONS:  During your procedure today, you received medications for sedation.  These   medications may affect your judgment, balance and coordination.  Therefore,   for 24 hours, you have the following restrictions:   - DO NOT drive a car, operate machinery, make legal/financial decisions,   sign important papers or drink alcohol.    ACTIVITY:  Today: no heavy lifting, straining or running due to procedural   sedation/anesthesia.  The following day: return to full activity including work.  DIET:  Eat and drink normally unless instructed otherwise.     TREATMENT FOR COMMON SIDE EFFECTS:  - Mild abdominal pain, nausea, belching, bloating or excessive gas:  rest,   eat lightly and use a heating pad.  - Sore Throat: treat with throat lozenges and/or gargle with warm salt   water.  - Because air was used during the procedure, expelling large amounts of air   from your rectum or belching is normal.  - If a bowel prep was taken, you may not have a bowel movement for 1-3 days.    This is normal.  SYMPTOMS TO WATCH FOR AND REPORT TO YOUR PHYSICIAN:  1. Abdominal pain or bloating, other than gas cramps.  2. Chest pain.  3. Back pain.  4. Signs of infection such as: chills or fever occurring within 24 hours   after the procedure.  5. Rectal bleeding, which would show as bright red, maroon, or black stools.   (A tablespoon of blood from the rectum is not serious, especially  if   hemorrhoids are present.)  6. Vomiting.  7. Weakness or dizziness.  GO DIRECTLY TO THE NEAREST EMERGENCY ROOM IF YOU HAVE ANY OF THE FOLLOWING:      Difficulty breathing              Chills and/or fever over 101 F   Persistent vomiting and/or vomiting blood   Severe abdominal pain   Severe chest pain   Black, tarry stools   Bleeding- more than one tablespoon   Any other symptom or condition that you feel may need urgent attention  Your doctor recommends these additional instructions:  If any biopsies were taken, your doctors clinic will contact you in 1 to 2   weeks with any results.  - Patient has a contact number available for emergencies.  The signs and   symptoms of potential delayed complications were discussed with the   patient.  Return to normal activities tomorrow.  Written discharge   instructions were provided to the patient.   - Discharge patient to home (via wheelchair).   - Resume previous diet today.   - Continue present medications.   - No aspirin, ibuprofen, naproxen, or other non-steroidal anti-inflammatory   drugs for 7 days after polyp removal.   - Repeat colonoscopy in 1 year because the bowel preparation was   suboptimal.  For questions, problems or results please call your physician Aliyah Otoole MD at Work:  [phone]  If you have any questions about the above instructions, call the GI   department at [phone] or call the endoscopy unit at [phone]   from 7am until 3 pm.  [facility], EMERGENCY ROOM PHONE NUMBER:   [phone]  IF A COMPLICATION OR EMERGENCY SITUATION ARISES AND YOU ARE UNABLE TO REACH   YOUR PHYSICIAN - GO DIRECTLY TO THE EMERGENCY ROOM.  I have read or have had read to me these discharge instructions for my   procedure and have received a written copy.  I understand these   instructions and will follow-up with my physician if I have any questions.     __________________________________        _____________________________________  Nurse Signature                                          Patient/Designated   Responsible Party Signature  MD Aliyah Schaefer MD  1/18/2022 11:29:15 AM  This report has been verified and signed electronically.  Dear patient,  As a result of recent federal legislation (The Federal Cures Act), you may   receive lab or pathology results from your procedure in your [facility]   account before your physician is able to contact you. Your physician or   their representative will relay the results to you with their   recommendations at their soonest availability.  Thank you,  PROVATION

## 2022-01-18 NOTE — H&P
"PRE PROCEDURE H&P    Patient Name: Anupama Olivo  MRN: [id number]  : 1955  Date of Procedure:  2022  Referring Physician: Kim Krueger MD  Primary Physician: Kim Krueger MD  Procedure Physician: Aliyah Otoole MD       Planned Procedure: Colonoscopy  Diagnosis: previous adenomatous polyp  Chief Complaint: Same as above    HPI: Patient is an 66 y.o. female is here for the above.     Last colonoscopy:   Family history: grandfather   Anticoagulation: none     Past Medical History:   Past Medical History:   Diagnosis Date    Anxiety     Diabetes mellitus     Pre Diabetes    GERD (gastroesophageal reflux disease)     HLD (hyperlipidemia)     HTN (hypertension)     Hypothyroid     Insulin resistance     MDD (major depressive disorder)     Obesity     Type 2 diabetes mellitus         Past Surgical History:  Past Surgical History:   Procedure Laterality Date    APPENDECTOMY  1982    BARTHOLIN GLAND CYST EXCISION Left 2010    Dr ADBOULAYE Fang    BREAST BIOPSY Right     20 years ago. benign.    CATARACT EXTRACTION EXTRACAPSULAR W/ INTRAOCULAR LENS IMPLANTATION Bilateral     CHOLECYSTECTOMY  2014    lap choly for biliary dyskinesia    HYSTERECTOMY      OOPHORECTOMY      ORIF WRIST FRACTURE      comminuted intraarticular fx of distal Lt radius w/assoc avulsion fx styloid process of ulna - "metalplate    rectocele   repair          Home Medications:  Prior to Admission medications    Medication Sig Start Date End Date Taking? Authorizing Provider   blood sugar diagnostic (TRUETEST TEST STRIPS) Strp 1 strip by Misc.(Non-Drug; Combo Route) route 4 (four) times daily with meals and nightly. [employer] True Metrix Test Strips 18  Yes Kim Krueger MD   blood-glucose meter (TRUE METRIX AIR GLUCOSE METER) kit USE AS DIRECTED 18  Yes Kim Krueger MD   levothyroxine (SYNTHROID) 137 MCG Tab tablet TAKE 1 TABLET BY MOUTH BEFORE BREAKFAST.  Patient taking differently: TAKE " 1 TABLET BY MOUTH BEFORE BREAKFAST. 8/10/21  Yes Kim Krueger MD   lovastatin (MEVACOR) 40 MG tablet Take 1 tablet (40 mg total) by mouth nightly. 1/27/21  Yes Kim Krueger MD   metFORMIN (GLUCOPHAGE-XR) 500 MG ER 24hr tablet 4 tab po daily 1/27/21  Yes Kim Krueger MD   omeprazole (PRILOSEC) 40 MG capsule Take 1 capsule (40 mg total) by mouth once daily. 1/27/21  Yes Kim Krueger MD   paroxetine (PAXIL) 10 MG tablet  12/17/20  Yes Historical Provider   paroxetine (PAXIL) 40 MG tablet Take 40 mg by mouth every evening.  5/12/12  Yes Historical Provider   traZODone (DESYREL) 50 MG tablet Take 1 tablet (50 mg total) by mouth every evening. 4/16/18  Yes Kim Krueger MD   triamterene-hydrochlorothiazide 75-50 mg (MAXZIDE) 75-50 mg per tablet TAKE 1 TABLET ONE TIME DAILY 1/27/21  Yes Kim Krueger MD   aspirin (ECOTRIN) 81 MG EC tablet Take 1 tablet by mouth Daily. 5/12/12   Historical Provider   lancets (TRUEPLUS LANCETS) 28 gauge Misc 1 lancet by Misc.(Non-Drug; Combo Route) route 4 (four) times daily with meals and nightly. [employer] brand 4/16/18   Kim Krueger MD   sod sulf-pot chloride-mag sulf (SUTAB) 1.479-0.188- 0.225 gram tablet Take 12 tablets by mouth once daily. Take according to package instructions with indicated amount of water. 12/3/21   Herminio Serrano PA-C   triamcinolone acetonide 0.1% (KENALOG) 0.1 % cream Apply topically 2 (two) times daily. 8/2/16 7/27/21  Kim Krueger MD        Allergies:  Review of patient's allergies indicates:   Allergen Reactions    Betamethasone sodium phosphate      Sweat and depression    Codeine      Other reaction(s): Shortness of breath    Loratadine      Other reaction(s): Felt distant    Prednisone      Other reaction(s): sweat and depression    Tomato (solanum lycopersicum) Hives and Rash        Social History:   Social History     Socioeconomic History    Marital status:     Number of children: 2   Occupational History     "Occupation: retired   Tobacco Use    Smoking status: Never Smoker    Smokeless tobacco: Never Used   Substance and Sexual Activity    Alcohol use: Yes     Alcohol/week: 0.0 standard drinks     Comment: rarely // once a year    Drug use: No    Sexual activity: Not Currently     Partners: Male     Birth control/protection: None       Family History:  Family History   Problem Relation Age of Onset    Diabetes Unknown     Colon cancer Maternal Grandmother     Colon cancer Cousin     Colon cancer Cousin     Breast cancer Neg Hx        ROS: No acute cardiac events, no acute respiratory complaints.     Physical Exam (all patients):    /78 (BP Location: Right arm, Patient Position: Sitting)   Pulse 78   Temp 97.5 °F (36.4 °C) (Temporal)   Resp 18   Ht 5' 4" (1.626 m)   Wt 96.2 kg (211 lb 15.6 oz)   SpO2 98%   Breastfeeding No   BMI 36.38 kg/m²   Lungs: Clear to auscultation bilaterally, respirations unlabored  Heart: Regular rate and rhythm, S1 and S2 normal, no obvious murmurs  Abdomen:         Soft, non-tender, bowel sounds normal, no masses, no organomegaly    Lab Results   Component Value Date    WBC 8.40 07/27/2021    MCV 95 07/27/2021    RDW 15.1 (H) 07/27/2021     07/27/2021    GLU 87 07/27/2021    HGBA1C 5.8 (H) 07/27/2021    BUN 13 07/27/2021     07/27/2021    K 4.5 07/27/2021     07/27/2021        SEDATION PLAN: per anesthesia      History reviewed, vital signs satisfactory, cardiopulmonary status satisfactory, sedation options, risks and plans have been discussed with the patient  All their questions were answered and the patient agrees to the sedation procedures as planned and the patient is deemed an appropriate candidate for the sedation as planned.    Procedure explained to patient, informed consent obtained and placed in chart.    Aliyah Otoole  1/18/2022  11:07 AM   "

## 2022-01-18 NOTE — ANESTHESIA POSTPROCEDURE EVALUATION
Anesthesia Post Evaluation    Patient: Anupama Olivo    Procedure(s) Performed: Procedure(s) (LRB):  COLONOSCOPY (N/A)    Final Anesthesia Type: general      Patient location during evaluation: PACU  Patient participation: Yes- Able to Participate  Level of consciousness: awake and alert and oriented  Post-procedure vital signs: reviewed and stable  Pain management: adequate  Airway patency: patent    PONV status at discharge: No PONV  Anesthetic complications: no      Cardiovascular status: blood pressure returned to baseline, stable and hemodynamically stable  Respiratory status: unassisted  Hydration status: euvolemic  Follow-up not needed.          Vitals Value Taken Time   BP 99/59 01/18/22 1134   Temp 36.2 °C (97.1 °F) 01/18/22 1130   Pulse 75 01/18/22 1134   Resp 12 01/18/22 1134   SpO2 100 % 01/18/22 1134   Vitals shown include unvalidated device data.      No case tracking events are documented in the log.      Pain/Jose De Jesus Score: Jose De Jesus Score: 10 (1/18/2022 11:30 AM)

## 2022-01-18 NOTE — TRANSFER OF CARE
"Anesthesia Transfer of Care Note    Patient: Anupama Olivo    Procedure(s) Performed: Procedure(s) (LRB):  COLONOSCOPY (N/A)    Patient location: PACU    Anesthesia Type: general    Transport from OR: Transported from OR on room air with adequate spontaneous ventilation    Post pain: adequate analgesia    Post assessment: no apparent anesthetic complications and tolerated procedure well    Post vital signs: stable    Level of consciousness: awake and alert    Nausea/Vomiting: no nausea/vomiting    Complications: none    Transfer of care protocol was followed      Last vitals:   Visit Vitals  /78 (BP Location: Right arm, Patient Position: Sitting)   Pulse 78   Temp 36.4 °C (97.5 °F) (Temporal)   Resp 18   Ht 5' 4" (1.626 m)   Wt 96.2 kg (211 lb 15.6 oz)   SpO2 98%   Breastfeeding No   BMI 36.38 kg/m²     "

## 2022-01-19 ENCOUNTER — PATIENT MESSAGE (OUTPATIENT)
Dept: ADMINISTRATIVE | Facility: OTHER | Age: 67
End: 2022-01-19
Payer: MEDICARE

## 2022-01-22 DIAGNOSIS — E78.5 DYSLIPIDEMIA: ICD-10-CM

## 2022-01-22 NOTE — TELEPHONE ENCOUNTER
Care Due:                  Date            Visit Type   Department     Provider  --------------------------------------------------------------------------------                                             [facility]  Last Visit: 07-      None         [facility]           Kim Krueger                                           [facility]  Next Visit: 02-      [facility]           Kim Krueger                                                            Last  Test          Frequency    Reason                     Performed    Due Date  --------------------------------------------------------------------------------    HBA1C.......  6 months...  metFORMIN................  07- 01-    Powered by [employer]. Reference number: [id number].   1/22/2022 8:56:40 AM CST

## 2022-01-24 LAB
FINAL PATHOLOGIC DIAGNOSIS: NORMAL
GROSS: NORMAL
Lab: NORMAL

## 2022-01-25 ENCOUNTER — PATIENT MESSAGE (OUTPATIENT)
Dept: PRIMARY CARE CLINIC | Facility: CLINIC | Age: 67
End: 2022-01-25
Payer: MEDICARE

## 2022-01-26 ENCOUNTER — PATIENT MESSAGE (OUTPATIENT)
Dept: PRIMARY CARE CLINIC | Facility: CLINIC | Age: 67
End: 2022-01-26
Payer: MEDICARE

## 2022-01-26 DIAGNOSIS — E78.5 DYSLIPIDEMIA: ICD-10-CM

## 2022-01-26 RX ORDER — TRIAMTERENE AND HYDROCHLOROTHIAZIDE 75; 50 MG/1; MG/1
TABLET ORAL
Qty: 90 TABLET | Refills: 3 | Status: SHIPPED | OUTPATIENT
Start: 2022-01-26 | End: 2022-01-27 | Stop reason: SDUPTHER

## 2022-01-26 RX ORDER — LOVASTATIN 40 MG/1
40 TABLET ORAL NIGHTLY
Qty: 90 TABLET | Refills: 3 | Status: SHIPPED | OUTPATIENT
Start: 2022-01-26 | End: 2022-01-27 | Stop reason: SDUPTHER

## 2022-01-26 RX ORDER — LEVOTHYROXINE SODIUM 137 UG/1
TABLET ORAL
Qty: 90 TABLET | Refills: 3 | Status: SHIPPED | OUTPATIENT
Start: 2022-01-26 | End: 2022-01-27 | Stop reason: SDUPTHER

## 2022-01-26 RX ORDER — OMEPRAZOLE 40 MG/1
40 CAPSULE, DELAYED RELEASE ORAL DAILY
Qty: 90 CAPSULE | Refills: 3 | Status: SHIPPED | OUTPATIENT
Start: 2022-01-26 | End: 2022-01-27 | Stop reason: SDUPTHER

## 2022-01-26 RX ORDER — METFORMIN HYDROCHLORIDE 500 MG/1
TABLET, EXTENDED RELEASE ORAL
Qty: 360 TABLET | Refills: 3 | Status: SHIPPED | OUTPATIENT
Start: 2022-01-26 | End: 2022-01-27 | Stop reason: SDUPTHER

## 2022-01-26 NOTE — TELEPHONE ENCOUNTER
No new care gaps identified.  Powered by [employer]. Reference number: [id number].   1/26/2022 2:01:46 PM CST

## 2022-01-27 RX ORDER — LOVASTATIN 40 MG/1
40 TABLET ORAL NIGHTLY
Qty: 90 TABLET | Refills: 3 | Status: SHIPPED | OUTPATIENT
Start: 2022-01-27 | End: 2022-03-15 | Stop reason: SDUPTHER

## 2022-01-27 RX ORDER — METFORMIN HYDROCHLORIDE 500 MG/1
TABLET, EXTENDED RELEASE ORAL
Qty: 360 TABLET | Refills: 3 | OUTPATIENT
Start: 2022-01-27

## 2022-01-27 RX ORDER — OMEPRAZOLE 40 MG/1
40 CAPSULE, DELAYED RELEASE ORAL DAILY
Qty: 90 CAPSULE | Refills: 3 | OUTPATIENT
Start: 2022-01-27

## 2022-01-27 RX ORDER — LOVASTATIN 40 MG/1
40 TABLET ORAL NIGHTLY
Qty: 90 TABLET | Refills: 3 | OUTPATIENT
Start: 2022-01-27

## 2022-01-27 RX ORDER — LEVOTHYROXINE SODIUM 137 UG/1
TABLET ORAL
Qty: 90 TABLET | Refills: 3 | Status: SHIPPED | OUTPATIENT
Start: 2022-01-27 | End: 2022-03-15 | Stop reason: SDUPTHER

## 2022-01-27 RX ORDER — METFORMIN HYDROCHLORIDE 500 MG/1
TABLET, EXTENDED RELEASE ORAL
Qty: 360 TABLET | Refills: 3 | Status: SHIPPED | OUTPATIENT
Start: 2022-01-27 | End: 2023-02-15 | Stop reason: SDUPTHER

## 2022-01-27 RX ORDER — OMEPRAZOLE 40 MG/1
40 CAPSULE, DELAYED RELEASE ORAL DAILY
Qty: 90 CAPSULE | Refills: 3 | Status: SHIPPED | OUTPATIENT
Start: 2022-01-27 | End: 2022-03-15 | Stop reason: SDUPTHER

## 2022-01-27 RX ORDER — TRIAMTERENE AND HYDROCHLOROTHIAZIDE 75; 50 MG/1; MG/1
TABLET ORAL
Qty: 90 TABLET | Refills: 3 | OUTPATIENT
Start: 2022-01-27

## 2022-01-27 RX ORDER — LEVOTHYROXINE SODIUM 137 UG/1
TABLET ORAL
Qty: 90 TABLET | Refills: 3 | OUTPATIENT
Start: 2022-01-27

## 2022-01-27 RX ORDER — TRIAMTERENE AND HYDROCHLOROTHIAZIDE 75; 50 MG/1; MG/1
TABLET ORAL
Qty: 90 TABLET | Refills: 3 | Status: SHIPPED | OUTPATIENT
Start: 2022-01-27 | End: 2022-03-15 | Stop reason: SDUPTHER

## 2022-01-27 NOTE — TELEPHONE ENCOUNTER
Quick DC. Request already responded to by other means (e.g. phone or fax)   Refill Authorization Note   Anupama Parent  is requesting a refill authorization.  Brief Assessment and Rationale for Refill:  Quick Discontinue  Medication Therapy Plan:              Comments:   Pended Medication(s)       Requested Prescriptions     Pending Prescriptions Disp Refills    metFORMIN (GLUCOPHAGE-XR) 500 MG ER 24hr tablet [Pharmacy Med Name: METFORMIN HCL  MG  Tablet] 360 tablet 3     Sig: TAKE 4 TABLETS BY MOUTH DAILY    triamterene-hydrochlorothiazide 75-50 mg (MAXZIDE) 75-50 mg per tablet [Pharmacy Med Name: TRIAMTERENE-HCTZ 75/50 TAB 75-50 Tablet] 90 tablet 3     Sig: TAKE 1 TABLET ONE TIME DAILY    omeprazole (PRILOSEC) 40 MG capsule [Pharmacy Med Name: OMEPRAZOLE DR 40 MG CAPSULE 40 Capsule] 90 capsule 3     Sig: TAKE 1 CAPSULE (40 MG TOTAL) BY MOUTH ONCE DAILY.    levothyroxine (SYNTHROID) 137 MCG Tab tablet [Pharmacy Med Name: LEVOTHYROXINE SODIUM 137  Tablet] 90 tablet 3     Sig: TAKE 1 TABLET BEFORE BREAKFAST.    lovastatin (MEVACOR) 40 MG tablet [Pharmacy Med Name: LOVASTATIN 40 MG TABLET 40 Tablet] 90 tablet 3     Sig: TAKE 1 TABLET (40 MG TOTAL) BY MOUTH NIGHTLY.        Duplicate Pended Encounter(s)/ Last Prescribed Details: (includes pharmacy & prescriber details)   Ordering Encounter Report    Associated Reports   View Encounter                Note composed:12:02 PM 01/27/2022

## 2022-02-14 ENCOUNTER — OFFICE VISIT (OUTPATIENT)
Dept: PRIMARY CARE CLINIC | Facility: CLINIC | Age: 67
End: 2022-02-14
Payer: MEDICARE

## 2022-02-14 ENCOUNTER — LAB VISIT (OUTPATIENT)
Dept: LAB | Facility: HOSPITAL | Age: 67
End: 2022-02-14
Attending: FAMILY MEDICINE
Payer: MEDICARE

## 2022-02-14 VITALS
DIASTOLIC BLOOD PRESSURE: 70 MMHG | OXYGEN SATURATION: 97 % | BODY MASS INDEX: 36.15 KG/M2 | HEART RATE: 60 BPM | HEIGHT: 64 IN | TEMPERATURE: 98 F | SYSTOLIC BLOOD PRESSURE: 128 MMHG | WEIGHT: 211.75 LBS

## 2022-02-14 DIAGNOSIS — N18.30 SECONDARY DM WITH CKD STAGE 3 AND HYPERTENSION: ICD-10-CM

## 2022-02-14 DIAGNOSIS — I12.9 SECONDARY DM WITH CKD STAGE 3 AND HYPERTENSION: ICD-10-CM

## 2022-02-14 DIAGNOSIS — Z86.010 HISTORY OF COLON POLYPS: ICD-10-CM

## 2022-02-14 DIAGNOSIS — F33.42 RECURRENT MAJOR DEPRESSION IN FULL REMISSION: Chronic | ICD-10-CM

## 2022-02-14 DIAGNOSIS — E11.9 CONTROLLED TYPE 2 DIABETES MELLITUS WITHOUT COMPLICATION, WITHOUT LONG-TERM CURRENT USE OF INSULIN: ICD-10-CM

## 2022-02-14 DIAGNOSIS — E11.69 MIXED DIABETIC HYPERLIPIDEMIA ASSOCIATED WITH TYPE 2 DIABETES MELLITUS: Chronic | ICD-10-CM

## 2022-02-14 DIAGNOSIS — Z23 NEED FOR PNEUMOCOCCAL VACCINATION: ICD-10-CM

## 2022-02-14 DIAGNOSIS — E66.01 SEVERE OBESITY (BMI 35.0-39.9) WITH COMORBIDITY: ICD-10-CM

## 2022-02-14 DIAGNOSIS — E03.4 HYPOTHYROIDISM DUE TO ACQUIRED ATROPHY OF THYROID: Chronic | ICD-10-CM

## 2022-02-14 DIAGNOSIS — I10 ESSENTIAL HYPERTENSION: Chronic | ICD-10-CM

## 2022-02-14 DIAGNOSIS — E78.2 MIXED DIABETIC HYPERLIPIDEMIA ASSOCIATED WITH TYPE 2 DIABETES MELLITUS: Chronic | ICD-10-CM

## 2022-02-14 DIAGNOSIS — K21.9 GASTROESOPHAGEAL REFLUX DISEASE, UNSPECIFIED WHETHER ESOPHAGITIS PRESENT: ICD-10-CM

## 2022-02-14 DIAGNOSIS — I10 ESSENTIAL HYPERTENSION: Primary | Chronic | ICD-10-CM

## 2022-02-14 DIAGNOSIS — E13.22 SECONDARY DM WITH CKD STAGE 3 AND HYPERTENSION: ICD-10-CM

## 2022-02-14 LAB
ALBUMIN SERPL BCP-MCNC: 3.7 G/DL (ref 3.5–5.2)
ALP SERPL-CCNC: 97 U/L (ref 55–135)
ALT SERPL W/O P-5'-P-CCNC: 19 U/L (ref 10–44)
ANION GAP SERPL CALC-SCNC: 11 MMOL/L (ref 8–16)
AST SERPL-CCNC: 18 U/L (ref 10–40)
BASOPHILS # BLD AUTO: 0.05 K/UL (ref 0–0.2)
BASOPHILS NFR BLD: 0.6 % (ref 0–1.9)
BILIRUB SERPL-MCNC: 0.5 MG/DL (ref 0.1–1)
BUN SERPL-MCNC: 12 MG/DL (ref 8–23)
CALCIUM SERPL-MCNC: 10.1 MG/DL (ref 8.7–10.5)
CHLORIDE SERPL-SCNC: 99 MMOL/L (ref 95–110)
CHOLEST SERPL-MCNC: 164 MG/DL (ref 120–199)
CHOLEST/HDLC SERPL: 3.2 {RATIO} (ref 2–5)
CO2 SERPL-SCNC: 29 MMOL/L (ref 23–29)
CREAT SERPL-MCNC: 0.9 MG/DL (ref 0.5–1.4)
DIFFERENTIAL METHOD: ABNORMAL
EOSINOPHIL # BLD AUTO: 0.4 K/UL (ref 0–0.5)
EOSINOPHIL NFR BLD: 5 % (ref 0–8)
ERYTHROCYTE [DISTWIDTH] IN BLOOD BY AUTOMATED COUNT: 13.9 % (ref 11.5–14.5)
EST. GFR  (AFRICAN AMERICAN): >60 ML/MIN/1.73 M^2
EST. GFR  (NON AFRICAN AMERICAN): >60 ML/MIN/1.73 M^2
ESTIMATED AVG GLUCOSE: 114 MG/DL (ref 68–131)
GLUCOSE SERPL-MCNC: 91 MG/DL (ref 70–110)
HBA1C MFR BLD: 5.6 % (ref 4–5.6)
HCT VFR BLD AUTO: 43.2 % (ref 37–48.5)
HDLC SERPL-MCNC: 52 MG/DL (ref 40–75)
HDLC SERPL: 31.7 % (ref 20–50)
HGB BLD-MCNC: 13.5 G/DL (ref 12–16)
IMM GRANULOCYTES # BLD AUTO: 0.01 K/UL (ref 0–0.04)
IMM GRANULOCYTES NFR BLD AUTO: 0.1 % (ref 0–0.5)
LDLC SERPL CALC-MCNC: 91.4 MG/DL (ref 63–159)
LYMPHOCYTES # BLD AUTO: 2.6 K/UL (ref 1–4.8)
LYMPHOCYTES NFR BLD: 33 % (ref 18–48)
MCH RBC QN AUTO: 30.6 PG (ref 27–31)
MCHC RBC AUTO-ENTMCNC: 31.3 G/DL (ref 32–36)
MCV RBC AUTO: 98 FL (ref 82–98)
MONOCYTES # BLD AUTO: 0.5 K/UL (ref 0.3–1)
MONOCYTES NFR BLD: 5.8 % (ref 4–15)
NEUTROPHILS # BLD AUTO: 4.3 K/UL (ref 1.8–7.7)
NEUTROPHILS NFR BLD: 55.5 % (ref 38–73)
NONHDLC SERPL-MCNC: 112 MG/DL
NRBC BLD-RTO: 0 /100 WBC
PLATELET # BLD AUTO: 204 K/UL (ref 150–450)
PMV BLD AUTO: 12.9 FL (ref 9.2–12.9)
POTASSIUM SERPL-SCNC: 4.5 MMOL/L (ref 3.5–5.1)
PROT SERPL-MCNC: 7.4 G/DL (ref 6–8.4)
RBC # BLD AUTO: 4.41 M/UL (ref 4–5.4)
SODIUM SERPL-SCNC: 139 MMOL/L (ref 136–145)
T4 FREE SERPL-MCNC: 1.17 NG/DL (ref 0.71–1.51)
TRIGL SERPL-MCNC: 103 MG/DL (ref 30–150)
TSH SERPL DL<=0.005 MIU/L-ACNC: 3.37 UIU/ML (ref 0.4–4)
WBC # BLD AUTO: 7.79 K/UL (ref 3.9–12.7)

## 2022-02-14 PROCEDURE — 99214 OFFICE O/P EST MOD 30 MIN: CPT | Mod: 25,S$GLB,, | Performed by: FAMILY MEDICINE

## 2022-02-14 PROCEDURE — 3008F BODY MASS INDEX DOCD: CPT | Mod: CPTII,S$GLB,, | Performed by: FAMILY MEDICINE

## 2022-02-14 PROCEDURE — 99499 UNLISTED E&M SERVICE: CPT | Mod: S$GLB,,, | Performed by: FAMILY MEDICINE

## 2022-02-14 PROCEDURE — 80053 COMPREHEN METABOLIC PANEL: CPT | Performed by: FAMILY MEDICINE

## 2022-02-14 PROCEDURE — 84439 ASSAY OF FREE THYROXINE: CPT | Performed by: FAMILY MEDICINE

## 2022-02-14 PROCEDURE — 1159F PR MEDICATION LIST DOCUMENTED IN MEDICAL RECORD: ICD-10-PCS | Mod: CPTII,S$GLB,, | Performed by: FAMILY MEDICINE

## 2022-02-14 PROCEDURE — 99214 PR OFFICE/OUTPT VISIT, EST, LEVL IV, 30-39 MIN: ICD-10-PCS | Mod: 25,S$GLB,, | Performed by: FAMILY MEDICINE

## 2022-02-14 PROCEDURE — 3074F SYST BP LT 130 MM HG: CPT | Mod: CPTII,S$GLB,, | Performed by: FAMILY MEDICINE

## 2022-02-14 PROCEDURE — 83036 HEMOGLOBIN GLYCOSYLATED A1C: CPT | Performed by: FAMILY MEDICINE

## 2022-02-14 PROCEDURE — 1159F MED LIST DOCD IN RCRD: CPT | Mod: CPTII,S$GLB,, | Performed by: FAMILY MEDICINE

## 2022-02-14 PROCEDURE — 3074F PR MOST RECENT SYSTOLIC BLOOD PRESSURE < 130 MM HG: ICD-10-PCS | Mod: CPTII,S$GLB,, | Performed by: FAMILY MEDICINE

## 2022-02-14 PROCEDURE — 99499 RISK ADDL DX/OHS AUDIT: ICD-10-PCS | Mod: S$GLB,,, | Performed by: FAMILY MEDICINE

## 2022-02-14 PROCEDURE — 80061 LIPID PANEL: CPT | Performed by: FAMILY MEDICINE

## 2022-02-14 PROCEDURE — G0009 PNEUMOCOCCAL POLYSACCHARIDE VACCINE 23-VALENT =>2YO SQ IM: ICD-10-PCS | Mod: S$GLB,,, | Performed by: FAMILY MEDICINE

## 2022-02-14 PROCEDURE — 99999 PR PBB SHADOW E&M-EST. PATIENT-LVL IV: ICD-10-PCS | Mod: PBBFAC,,, | Performed by: FAMILY MEDICINE

## 2022-02-14 PROCEDURE — 90732 PPSV23 VACC 2 YRS+ SUBQ/IM: CPT | Mod: S$GLB,,, | Performed by: FAMILY MEDICINE

## 2022-02-14 PROCEDURE — 85025 COMPLETE CBC W/AUTO DIFF WBC: CPT | Performed by: FAMILY MEDICINE

## 2022-02-14 PROCEDURE — 3078F DIAST BP <80 MM HG: CPT | Mod: CPTII,S$GLB,, | Performed by: FAMILY MEDICINE

## 2022-02-14 PROCEDURE — G0009 ADMIN PNEUMOCOCCAL VACCINE: HCPCS | Mod: S$GLB,,, | Performed by: FAMILY MEDICINE

## 2022-02-14 PROCEDURE — 84443 ASSAY THYROID STIM HORMONE: CPT | Performed by: FAMILY MEDICINE

## 2022-02-14 PROCEDURE — 99999 PR PBB SHADOW E&M-EST. PATIENT-LVL IV: CPT | Mod: PBBFAC,,, | Performed by: FAMILY MEDICINE

## 2022-02-14 PROCEDURE — 90732 PNEUMOCOCCAL POLYSACCHARIDE VACCINE 23-VALENT =>2YO SQ IM: ICD-10-PCS | Mod: S$GLB,,, | Performed by: FAMILY MEDICINE

## 2022-02-14 PROCEDURE — 3008F PR BODY MASS INDEX (BMI) DOCUMENTED: ICD-10-PCS | Mod: CPTII,S$GLB,, | Performed by: FAMILY MEDICINE

## 2022-02-14 PROCEDURE — 3078F PR MOST RECENT DIASTOLIC BLOOD PRESSURE < 80 MM HG: ICD-10-PCS | Mod: CPTII,S$GLB,, | Performed by: FAMILY MEDICINE

## 2022-02-14 PROCEDURE — 36415 COLL VENOUS BLD VENIPUNCTURE: CPT | Mod: PN | Performed by: FAMILY MEDICINE

## 2022-02-14 RX ORDER — PAROXETINE HYDROCHLORIDE 40 MG/1
40 TABLET, FILM COATED ORAL NIGHTLY
Qty: 90 TABLET | Refills: 3 | Status: SHIPPED | OUTPATIENT
Start: 2022-02-14 | End: 2022-03-14 | Stop reason: SDUPTHER

## 2022-02-14 RX ORDER — TRAZODONE HYDROCHLORIDE 50 MG/1
50 TABLET ORAL NIGHTLY
Qty: 90 TABLET | Refills: 3 | Status: SHIPPED | OUTPATIENT
Start: 2022-02-14 | End: 2022-03-14 | Stop reason: SDUPTHER

## 2022-02-14 RX ORDER — PAROXETINE 10 MG/1
10 TABLET, FILM COATED ORAL DAILY
Qty: 90 TABLET | Refills: 3 | Status: SHIPPED | OUTPATIENT
Start: 2022-02-14 | End: 2022-03-14 | Stop reason: SDUPTHER

## 2022-02-14 NOTE — PROGRESS NOTES
Subjective:      Patient ID: Anupama Mays Parent is a 66 y.o. female.    Chief Complaint: Annual Exam    Disclaimer:  This note is prepared using voice recognition software and as such is likely to have errors and has not been proof read. Please contact me for questions.     Patient is coming in today for wellness exam and followup of chronic issues   .   She now has diabetes.  She's on metformin now.  Doing well though.   Still working on weight loss.  Needing to check lab work today.    Did covid vaccine. Willing to do pna 23 today     She has hypothyroidism .  Needing to recheck labs.  And adjust accordingly.     Hyperlipidemia currently on a statin therapy.  Within goal ranges for her levels.    Depressions currently controlled followed by Dr. Cifuentes at the mental health clinic in [place] but wants me to take over her meds. Would like to establish with a female psychiatrist in the future when needed. She did  and  her . On paxil at 50mg and trazadone 50mg at night.  Gets through local pharmacy    Hypertension is controlled with a diuretic. Stable.  Labs ordered.     Has chronic gastroesophageal reflux disease for which she only tolerates Nexium.  However her insurance will now not pay for the Nexium.  Thus she is willing to try to do omeprazole again.  She has tried and failed Prilosec Prevacid omeprazole Protonix Zantac and Pepcid.    She is needing to update mammogram.      Lab Results       Component                Value               Date                       HGBA1C                   5.8 (H)             07/27/2021                 HGBA1C                   5.7 (H)             01/27/2021                 HGBA1C                   5.7 (H)             01/27/2021             Lab Results       Component                Value               Date                       CHOL                     194                 07/27/2021                 CHOL                     160                 01/27/2021                  CHOL                     159                 02/14/2020            Lab Results       Component                Value               Date                       LDLCALC                  111.0               07/27/2021                 LDLCALC                  82.6                01/27/2021                 LDLCALC                  84.8                02/14/2020              Wt Readings from Last 10 Encounters:  02/14/22 : 96.1 kg (211 lb 12 oz)  01/18/22 : 96.2 kg (211 lb 15.6 oz)  11/18/21 : 98.4 kg (216 lb 14.9 oz)  08/24/21 : 98.9 kg (218 lb)  07/29/21 : 98.9 kg (218 lb 0.6 oz)  07/27/21 : 98.9 kg (218 lb 0.6 oz)  01/27/21 : 101.1 kg (222 lb 14.2 oz)  03/04/20 : 101.5 kg (223 lb 12.3 oz)  02/17/20 : 101.5 kg (223 lb 12.3 oz)  08/23/19 : 102.6 kg (226 lb 3.1 oz)      The 10-year ASCVD risk score (Yemi PISANO Jr., et al., 2013) is: 13%    Values used to calculate the score:      Age: 66 years      Sex: Female      Is Non- : No      Diabetic: Yes      Tobacco smoker: No      Systolic Blood Pressure: 128 mmHg      Is BP treated: Yes      HDL Cholesterol: 49 mg/dL      Total Cholesterol: 194 mg/dL          Diabetes  She presents for her follow-up diabetic visit. She has type 2 diabetes mellitus. No MedicAlert identification noted. The initial diagnosis of diabetes was made 12 months ago. Pertinent negatives for hypoglycemia include no confusion, dizziness, headaches, hunger, mood changes, nervousness/anxiousness, pallor, seizures, sleepiness, sweats or tremors. Pertinent negatives for diabetes include no blurred vision, no chest pain, no fatigue, no foot paresthesias, no foot ulcerations, no polydipsia, no polyphagia, no polyuria, no visual change and no weight loss. Pertinent negatives for hypoglycemia complications include no blackouts, no nocturnal hypoglycemia, no required assistance and no required glucagon injection. Symptoms are stable. Pertinent negatives for diabetic complications  include no autonomic neuropathy, CVA, heart disease, impotence, nephropathy, peripheral neuropathy, PVD or retinopathy. Risk factors for coronary artery disease include hypertension and obesity. Current diabetic treatment includes oral agent (monotherapy). She is compliant with treatment most of the time. Her weight is stable. She is following a generally healthy diet. Meal planning includes carbohydrate counting. She has not had a previous visit with a dietitian. She participates in exercise three times a week. She monitors blood glucose at home 1-2 x per week. Blood glucose monitoring compliance is good. There is no change in her home blood glucose trend. She does not see a podiatrist.Eye exam is current.       Lab Results   Component Value Date    WBC 8.40 07/27/2021    HGB 13.9 07/27/2021    HCT 42.4 07/27/2021     07/27/2021    CHOL 194 07/27/2021    TRIG 170 (H) 07/27/2021    HDL 49 07/27/2021    ALT 21 07/27/2021    AST 23 07/27/2021     07/27/2021    K 4.5 07/27/2021     07/27/2021    CREATININE 1.1 07/27/2021    BUN 13 07/27/2021    CO2 25 07/27/2021    TSH 18.980 (H) 07/27/2021    HGBA1C 5.8 (H) 07/27/2021       Mammo Digital Screening Bilat w/ Kiko  Narrative: Result:  Mammo Digital Screening Bilat w/ Kiko    History:  Patient is 66 y.o. and is seen for a screening mammogram.    Films Compared:  Prior images (if available) were compared.     Findings:   This procedure was performed using tomosynthesis.   Computer-aided detection was utilized in the interpretation of this   examination.    The breasts have scattered areas of fibroglandular density. There is no   evidence of suspicious masses, microcalcifications or architectural   distortion.  Impression:    No mammographic evidence of malignancy.    BI-RADS Category 1: Negative    Recommendation:  Routine screening mammogram in 1 year is recommended.    Your estimated lifetime risk of breast cancer (to age 85) based on   Tyrer-Cuzick  "risk assessment model is Elvira-Wiliamck: 6.45 %. According to   the American [employer] Society, patients with a lifetime breast cancer risk   of 20% or higher might benefit from supplemental screening tests.        Review of Systems   Constitutional: Negative for chills, fatigue, fever and weight loss.   HENT: Negative for ear pain and trouble swallowing.    Eyes: Negative for blurred vision, pain and visual disturbance.   Respiratory: Negative for cough and shortness of breath.    Cardiovascular: Negative for chest pain and leg swelling.   Gastrointestinal: Negative for abdominal pain, blood in stool, nausea and vomiting.   Endocrine: Negative for cold intolerance, heat intolerance, polydipsia, polyphagia and polyuria.   Genitourinary: Negative for dysuria, frequency and impotence.   Musculoskeletal: Negative for joint swelling, myalgias and neck pain.   Skin: Negative for color change, pallor and rash.   Neurological: Negative for dizziness, tremors, seizures and headaches.   Psychiatric/Behavioral: Negative for behavioral problems, confusion and sleep disturbance. The patient is not nervous/anxious.      Objective:     Vitals:    02/14/22 1009   BP: 128/70   Pulse: 60   Temp: 98.1 °F (36.7 °C)   SpO2: 97%   Weight: 96.1 kg (211 lb 12 oz)   Height: 5' 4" (1.626 m)     Physical Exam  Vitals reviewed.   Constitutional:       Appearance: Normal appearance. She is well-developed and well-groomed. She is morbidly obese.   HENT:      Head: Normocephalic and atraumatic.      Right Ear: Tympanic membrane and external ear normal.      Left Ear: Tympanic membrane and external ear normal.      Nose: Nose normal.      Mouth/Throat:      Mouth: Mucous membranes are moist.      Pharynx: Oropharynx is clear.   Eyes:      Conjunctiva/sclera: Conjunctivae normal.      Pupils: Pupils are equal, round, and reactive to light.   Neck:      Thyroid: No thyromegaly.   Cardiovascular:      Rate and Rhythm: Normal rate and regular rhythm.      " Heart sounds: No murmur heard.  No friction rub. No gallop.    Pulmonary:      Effort: Pulmonary effort is normal. No respiratory distress.      Breath sounds: No wheezing or rales.   Abdominal:      General: Bowel sounds are normal. There is no distension.      Palpations: Abdomen is soft.      Tenderness: There is no abdominal tenderness. There is no rebound.   Musculoskeletal:         General: Normal range of motion.      Cervical back: Normal range of motion and neck supple.   Lymphadenopathy:      Cervical: No cervical adenopathy.   Skin:     General: Skin is warm and dry.      Findings: No rash.   Neurological:      Mental Status: She is alert and oriented to person, place, and time.   Psychiatric:         Attention and Perception: Attention and perception normal.         Mood and Affect: Mood and affect normal.         Speech: Speech normal.         Behavior: Behavior normal. Behavior is cooperative.         Thought Content: Thought content normal.         Cognition and Memory: Cognition and memory normal.         Judgment: Judgment normal.       Assessment:     1. Essential hypertension    2. Recurrent major depression in full remission    3. Mixed diabetic hyperlipidemia associated with type 2 diabetes mellitus    4. Severe obesity (BMI 35.0-39.9) with comorbidity    5. Controlled type 2 diabetes mellitus without complication, without long-term current use of insulin    6. Hypothyroidism due to acquired atrophy of thyroid    7. Gastroesophageal reflux disease, unspecified whether esophagitis present    8. History of colon polyps    9. Need for pneumococcal vaccination    10. Secondary DM with CKD stage 3 and hypertension      Plan:   Anupama was seen today for annual exam.    Diagnoses and all orders for this visit:    Essential hypertension - stable, labs ordered today.  Continue with current medications and interventions until labs are reviewed to make adjustments.     -     TSH; Future  -     T4, Free;  Future  -     Lipid Panel; Future  -     Hemoglobin A1C; Future  -     Comprehensive Metabolic Panel; Future  -     CBC Auto Differential; Future  -     Microalbumin/Creatinine Ratio, Urine; Future    Recurrent major depression in full remission - stable, labs ordered today.  Continue with current medications and interventions until labs are reviewed to make adjustments.   Refilled meds for 1 yr. Can refer to female psychiatry MD or NP when needed. Would like to use [facility] in future since has [facility].   -     TSH; Future  -     T4, Free; Future  -     Lipid Panel; Future  -     Hemoglobin A1C; Future  -     Comprehensive Metabolic Panel; Future  -     CBC Auto Differential; Future  -     Microalbumin/Creatinine Ratio, Urine; Future    Mixed diabetic hyperlipidemia associated with type 2 diabetes mellitus  - stable, labs ordered today.  Continue with current medications and interventions until labs are reviewed to make adjustments.     -     TSH; Future  -     T4, Free; Future  -     Lipid Panel; Future  -     Hemoglobin A1C; Future  -     Comprehensive Metabolic Panel; Future  -     CBC Auto Differential; Future  -     Microalbumin/Creatinine Ratio, Urine; Future    Severe obesity (BMI 35.0-39.9) with comorbidity - stable, labs ordered today.  Continue with current medications and interventions until labs are reviewed to make adjustments.     -     TSH; Future  -     T4, Free; Future  -     Lipid Panel; Future  -     Hemoglobin A1C; Future  -     Comprehensive Metabolic Panel; Future  -     CBC Auto Differential; Future  -     Microalbumin/Creatinine Ratio, Urine; Future    Controlled type 2 diabetes mellitus without complication, without long-term current use of insulin - stable, labs ordered today.  Continue with current medications and interventions until labs are reviewed to make adjustments.     -     TSH; Future  -     T4, Free; Future  -     Lipid Panel; Future  -     Hemoglobin A1C; Future  -      Comprehensive Metabolic Panel; Future  -     CBC Auto Differential; Future  -     Microalbumin/Creatinine Ratio, Urine; Future    Hypothyroidism due to acquired atrophy of thyroid - stable, labs ordered today.  Continue with current medications and interventions until labs are reviewed to make adjustments.     -     TSH; Future  -     T4, Free; Future  -     Lipid Panel; Future  -     Hemoglobin A1C; Future  -     Comprehensive Metabolic Panel; Future  -     CBC Auto Differential; Future  -     Microalbumin/Creatinine Ratio, Urine; Future    Gastroesophageal reflux disease, unspecified whether esophagitis present - stable, labs ordered today.  Continue with current medications and interventions until labs are reviewed to make adjustments.     -     TSH; Future  -     T4, Free; Future  -     Lipid Panel; Future  -     Hemoglobin A1C; Future  -     Comprehensive Metabolic Panel; Future  -     CBC Auto Differential; Future  -     Microalbumin/Creatinine Ratio, Urine; Future    History of colon polyps - stable, labs ordered today.  Continue with current medications and interventions until labs are reviewed to make adjustments.     -     TSH; Future  -     T4, Free; Future  -     Lipid Panel; Future  -     Hemoglobin A1C; Future  -     Comprehensive Metabolic Panel; Future  -     CBC Auto Differential; Future  -     Microalbumin/Creatinine Ratio, Urine; Future    Need for pneumococcal vaccination- update today.     Other orders  -     paroxetine (PAXIL) 10 MG tablet; Take 1 tablet (10 mg total) by mouth once daily. With the 40mg for total daily dose of 50mg in 24 hrs.  -     paroxetine (PAXIL) 40 MG tablet; Take 1 tablet (40 mg total) by mouth every evening. With the 10mg for total daily dose of 50mg in 24 hrs.  -     traZODone (DESYREL) 50 MG tablet; Take 1 tablet (50 mg total) by mouth every evening.  -     Pneumococcal Polysaccharide Vaccine (23 Valent) (SQ/IM)    gfr < 60, ckd stage 3 a, repeat today. Stable.          Follow up in about 6 months (around 8/14/2022) for f/u GLEN Naylor. / labs dm foot exam .    There are no Patient Instructions on file for this visit.

## 2022-02-16 NOTE — PROGRESS NOTES
Anupama,     Your lab results are within recommended goals for your age and conditions. No change in therapy is needed. Please let me know if you have further questions.    Sincerely,   Kim Krueger MD

## 2022-03-14 ENCOUNTER — PATIENT MESSAGE (OUTPATIENT)
Dept: PRIMARY CARE CLINIC | Facility: CLINIC | Age: 67
End: 2022-03-14
Payer: MEDICARE

## 2022-03-14 DIAGNOSIS — E78.5 DYSLIPIDEMIA: ICD-10-CM

## 2022-03-14 RX ORDER — TRAZODONE HYDROCHLORIDE 50 MG/1
50 TABLET ORAL NIGHTLY
Qty: 90 TABLET | Refills: 3 | Status: SHIPPED | OUTPATIENT
Start: 2022-03-14 | End: 2023-02-15 | Stop reason: SDUPTHER

## 2022-03-14 RX ORDER — PAROXETINE 10 MG/1
10 TABLET, FILM COATED ORAL DAILY
Qty: 90 TABLET | Refills: 3 | Status: SHIPPED | OUTPATIENT
Start: 2022-03-14 | End: 2023-02-15 | Stop reason: SDUPTHER

## 2022-03-14 RX ORDER — PAROXETINE HYDROCHLORIDE 40 MG/1
40 TABLET, FILM COATED ORAL NIGHTLY
Qty: 90 TABLET | Refills: 3 | Status: SHIPPED | OUTPATIENT
Start: 2022-03-14 | End: 2023-02-15 | Stop reason: SDUPTHER

## 2022-03-15 ENCOUNTER — PATIENT MESSAGE (OUTPATIENT)
Dept: PRIMARY CARE CLINIC | Facility: CLINIC | Age: 67
End: 2022-03-15
Payer: MEDICARE

## 2022-03-15 RX ORDER — LEVOTHYROXINE SODIUM 137 UG/1
TABLET ORAL
Qty: 90 TABLET | Refills: 3 | Status: SHIPPED | OUTPATIENT
Start: 2022-03-15 | End: 2023-02-15 | Stop reason: SDUPTHER

## 2022-03-15 RX ORDER — OMEPRAZOLE 40 MG/1
40 CAPSULE, DELAYED RELEASE ORAL DAILY
Qty: 90 CAPSULE | Refills: 3 | Status: SHIPPED | OUTPATIENT
Start: 2022-03-15 | End: 2023-02-15 | Stop reason: SDUPTHER

## 2022-03-15 RX ORDER — TRIAMTERENE AND HYDROCHLOROTHIAZIDE 75; 50 MG/1; MG/1
TABLET ORAL
Qty: 90 TABLET | Refills: 3 | Status: SHIPPED | OUTPATIENT
Start: 2022-03-15 | End: 2023-02-15 | Stop reason: SDUPTHER

## 2022-03-15 RX ORDER — LOVASTATIN 40 MG/1
40 TABLET ORAL NIGHTLY
Qty: 90 TABLET | Refills: 3 | Status: SHIPPED | OUTPATIENT
Start: 2022-03-15 | End: 2023-02-15 | Stop reason: SDUPTHER

## 2022-04-08 ENCOUNTER — PES CALL (OUTPATIENT)
Dept: ADMINISTRATIVE | Facility: CLINIC | Age: 67
End: 2022-04-08
Payer: MEDICARE

## 2022-06-30 ENCOUNTER — TELEPHONE (OUTPATIENT)
Dept: PRIMARY CARE CLINIC | Facility: CLINIC | Age: 67
End: 2022-06-30
Payer: MEDICARE

## 2022-06-30 NOTE — TELEPHONE ENCOUNTER
----- Message from Geovanna Taylor sent at 6/30/2022  3:16 PM CDT -----  Contact: pt  The pt request a return call, no additional info given and can be reached at [phone]///[id number]

## 2022-07-15 ENCOUNTER — PES CALL (OUTPATIENT)
Dept: ADMINISTRATIVE | Facility: CLINIC | Age: 67
End: 2022-07-15
Payer: MEDICARE

## 2022-08-15 ENCOUNTER — LAB VISIT (OUTPATIENT)
Dept: LAB | Facility: HOSPITAL | Age: 67
End: 2022-08-15
Attending: FAMILY MEDICINE
Payer: MEDICARE

## 2022-08-15 ENCOUNTER — OFFICE VISIT (OUTPATIENT)
Dept: PRIMARY CARE CLINIC | Facility: CLINIC | Age: 67
End: 2022-08-15
Payer: MEDICARE

## 2022-08-15 VITALS
OXYGEN SATURATION: 99 % | BODY MASS INDEX: 37.52 KG/M2 | TEMPERATURE: 90 F | SYSTOLIC BLOOD PRESSURE: 124 MMHG | DIASTOLIC BLOOD PRESSURE: 82 MMHG | HEIGHT: 64 IN | WEIGHT: 219.81 LBS | HEART RATE: 76 BPM

## 2022-08-15 DIAGNOSIS — I10 ESSENTIAL HYPERTENSION: Primary | ICD-10-CM

## 2022-08-15 DIAGNOSIS — I10 ESSENTIAL HYPERTENSION: ICD-10-CM

## 2022-08-15 DIAGNOSIS — E11.9 CONTROLLED TYPE 2 DIABETES MELLITUS WITHOUT COMPLICATION, WITHOUT LONG-TERM CURRENT USE OF INSULIN: ICD-10-CM

## 2022-08-15 DIAGNOSIS — E03.4 HYPOTHYROIDISM DUE TO ACQUIRED ATROPHY OF THYROID: ICD-10-CM

## 2022-08-15 LAB
ALBUMIN SERPL BCP-MCNC: 3.8 G/DL (ref 3.5–5.2)
ALP SERPL-CCNC: 98 U/L (ref 55–135)
ALT SERPL W/O P-5'-P-CCNC: 17 U/L (ref 10–44)
ANION GAP SERPL CALC-SCNC: 14 MMOL/L (ref 8–16)
AST SERPL-CCNC: 21 U/L (ref 10–40)
BILIRUB SERPL-MCNC: 0.5 MG/DL (ref 0.1–1)
BUN SERPL-MCNC: 12 MG/DL (ref 8–23)
CALCIUM SERPL-MCNC: 10 MG/DL (ref 8.7–10.5)
CHLORIDE SERPL-SCNC: 101 MMOL/L (ref 95–110)
CO2 SERPL-SCNC: 25 MMOL/L (ref 23–29)
CREAT SERPL-MCNC: 0.8 MG/DL (ref 0.5–1.4)
EST. GFR  (NO RACE VARIABLE): >60 ML/MIN/1.73 M^2
ESTIMATED AVG GLUCOSE: 114 MG/DL (ref 68–131)
GLUCOSE SERPL-MCNC: 90 MG/DL (ref 70–110)
HBA1C MFR BLD: 5.6 % (ref 4–5.6)
POTASSIUM SERPL-SCNC: 4 MMOL/L (ref 3.5–5.1)
PROT SERPL-MCNC: 7.6 G/DL (ref 6–8.4)
SODIUM SERPL-SCNC: 140 MMOL/L (ref 136–145)

## 2022-08-15 PROCEDURE — 3066F PR DOCUMENTATION OF TREATMENT FOR NEPHROPATHY: ICD-10-PCS | Mod: CPTII,S$GLB,, | Performed by: PHYSICIAN ASSISTANT

## 2022-08-15 PROCEDURE — 3008F PR BODY MASS INDEX (BMI) DOCUMENTED: ICD-10-PCS | Mod: CPTII,S$GLB,, | Performed by: PHYSICIAN ASSISTANT

## 2022-08-15 PROCEDURE — 84443 ASSAY THYROID STIM HORMONE: CPT | Performed by: PHYSICIAN ASSISTANT

## 2022-08-15 PROCEDURE — 99999 PR PBB SHADOW E&M-EST. PATIENT-LVL IV: CPT | Mod: PBBFAC,,, | Performed by: PHYSICIAN ASSISTANT

## 2022-08-15 PROCEDURE — 36415 COLL VENOUS BLD VENIPUNCTURE: CPT | Mod: PN | Performed by: PHYSICIAN ASSISTANT

## 2022-08-15 PROCEDURE — 1101F PT FALLS ASSESS-DOCD LE1/YR: CPT | Mod: CPTII,S$GLB,, | Performed by: PHYSICIAN ASSISTANT

## 2022-08-15 PROCEDURE — 1101F PR PT FALLS ASSESS DOC 0-1 FALLS W/OUT INJ PAST YR: ICD-10-PCS | Mod: CPTII,S$GLB,, | Performed by: PHYSICIAN ASSISTANT

## 2022-08-15 PROCEDURE — 99999 PR PBB SHADOW E&M-EST. PATIENT-LVL IV: ICD-10-PCS | Mod: PBBFAC,,, | Performed by: PHYSICIAN ASSISTANT

## 2022-08-15 PROCEDURE — 99499 UNLISTED E&M SERVICE: CPT | Mod: S$GLB,,, | Performed by: PHYSICIAN ASSISTANT

## 2022-08-15 PROCEDURE — 1159F PR MEDICATION LIST DOCUMENTED IN MEDICAL RECORD: ICD-10-PCS | Mod: CPTII,S$GLB,, | Performed by: PHYSICIAN ASSISTANT

## 2022-08-15 PROCEDURE — 3044F PR MOST RECENT HEMOGLOBIN A1C LEVEL <7.0%: ICD-10-PCS | Mod: CPTII,S$GLB,, | Performed by: PHYSICIAN ASSISTANT

## 2022-08-15 PROCEDURE — 99214 PR OFFICE/OUTPT VISIT, EST, LEVL IV, 30-39 MIN: ICD-10-PCS | Mod: S$GLB,,, | Performed by: PHYSICIAN ASSISTANT

## 2022-08-15 PROCEDURE — 84439 ASSAY OF FREE THYROXINE: CPT | Performed by: PHYSICIAN ASSISTANT

## 2022-08-15 PROCEDURE — 99214 OFFICE O/P EST MOD 30 MIN: CPT | Mod: S$GLB,,, | Performed by: PHYSICIAN ASSISTANT

## 2022-08-15 PROCEDURE — 3008F BODY MASS INDEX DOCD: CPT | Mod: CPTII,S$GLB,, | Performed by: PHYSICIAN ASSISTANT

## 2022-08-15 PROCEDURE — 80053 COMPREHEN METABOLIC PANEL: CPT | Performed by: PHYSICIAN ASSISTANT

## 2022-08-15 PROCEDURE — 3074F SYST BP LT 130 MM HG: CPT | Mod: CPTII,S$GLB,, | Performed by: PHYSICIAN ASSISTANT

## 2022-08-15 PROCEDURE — 1159F MED LIST DOCD IN RCRD: CPT | Mod: CPTII,S$GLB,, | Performed by: PHYSICIAN ASSISTANT

## 2022-08-15 PROCEDURE — 83036 HEMOGLOBIN GLYCOSYLATED A1C: CPT | Performed by: PHYSICIAN ASSISTANT

## 2022-08-15 PROCEDURE — 3066F NEPHROPATHY DOC TX: CPT | Mod: CPTII,S$GLB,, | Performed by: PHYSICIAN ASSISTANT

## 2022-08-15 PROCEDURE — 3079F DIAST BP 80-89 MM HG: CPT | Mod: CPTII,S$GLB,, | Performed by: PHYSICIAN ASSISTANT

## 2022-08-15 PROCEDURE — 3288F PR FALLS RISK ASSESSMENT DOCUMENTED: ICD-10-PCS | Mod: CPTII,S$GLB,, | Performed by: PHYSICIAN ASSISTANT

## 2022-08-15 PROCEDURE — 3044F HG A1C LEVEL LT 7.0%: CPT | Mod: CPTII,S$GLB,, | Performed by: PHYSICIAN ASSISTANT

## 2022-08-15 PROCEDURE — 3079F PR MOST RECENT DIASTOLIC BLOOD PRESSURE 80-89 MM HG: ICD-10-PCS | Mod: CPTII,S$GLB,, | Performed by: PHYSICIAN ASSISTANT

## 2022-08-15 PROCEDURE — 99499 RISK ADDL DX/OHS AUDIT: ICD-10-PCS | Mod: S$GLB,,, | Performed by: PHYSICIAN ASSISTANT

## 2022-08-15 PROCEDURE — 3074F PR MOST RECENT SYSTOLIC BLOOD PRESSURE < 130 MM HG: ICD-10-PCS | Mod: CPTII,S$GLB,, | Performed by: PHYSICIAN ASSISTANT

## 2022-08-15 PROCEDURE — 3061F NEG MICROALBUMINURIA REV: CPT | Mod: CPTII,S$GLB,, | Performed by: PHYSICIAN ASSISTANT

## 2022-08-15 PROCEDURE — 3288F FALL RISK ASSESSMENT DOCD: CPT | Mod: CPTII,S$GLB,, | Performed by: PHYSICIAN ASSISTANT

## 2022-08-15 PROCEDURE — 3061F PR NEG MICROALBUMINURIA RESULT DOCUMENTED/REVIEW: ICD-10-PCS | Mod: CPTII,S$GLB,, | Performed by: PHYSICIAN ASSISTANT

## 2022-08-15 NOTE — PROGRESS NOTES
Subjective:      Patient ID: Anupama Olivo is a 67 y.o. female.    Chief Complaint: Follow-up (6 month)    Anupama Olivo is a 67 y.o. female who presents to clinic for   Anupama Olivo is a 67 y.o. female who presents to clinic for 6 mth follow-up     Hx of:  DM - on metformin, controlled, need to check a1c   Hypothyroidism - on synthroid, needing to check thyroid levels   Depression - on paxil 50 mg   HTN - triamterine/hctz   GERD - on prilosec     Declines bone density scan and mammogram - Wants to wait on bone density scan and mammogram         At last 6 mth follow-up   Patient is coming in today for wellness exam and followup of chronic issues   .   She now has diabetes.  She's on metformin now.  Doing well though.   Still working on weight loss.  Needing to check lab work today.     Did covid vaccine. Willing to do pna 23 today      She has hypothyroidism .  Needing to recheck labs.  And adjust accordingly.      Hyperlipidemia currently on a statin therapy.  Within goal ranges for her levels.     Depressions currently controlled followed by Dr. Cifuentes at the mental health clinic in [place] but wants me to take over her meds. Would like to establish with a female psychiatrist in the future when needed. She did  and  her . On paxil at 50mg and trazadone 50mg at night.  Gets through local pharmacy     Hypertension is controlled with a diuretic. Stable.  Labs ordered.      Has chronic gastroesophageal reflux disease for which she only tolerates Nexium.  However her insurance will now not pay for the Nexium.  Thus she is willing to try to do omeprazole again.  She has tried and failed Prilosec Prevacid omeprazole Protonix Zantac and Pepcid.    She is needing to update mammogram.      Follow-up  Pertinent negatives include no abdominal pain, arthralgias, chest pain, congestion, coughing, fatigue, fever, myalgias, nausea, sore throat, vomiting or weakness.     Review of Systems  "  Constitutional: Negative for activity change, appetite change, fatigue, fever and unexpected weight change.   HENT: Negative for congestion, ear pain, sore throat and trouble swallowing.    Respiratory: Negative for cough and shortness of breath.    Cardiovascular: Negative for chest pain and palpitations.   Gastrointestinal: Negative for abdominal distention, abdominal pain, constipation, diarrhea, nausea and vomiting.   Genitourinary: Negative for difficulty urinating, frequency and urgency.   Musculoskeletal: Negative for arthralgias and myalgias.   Neurological: Negative for dizziness, weakness and light-headedness.   Psychiatric/Behavioral: Negative for decreased concentration and dysphoric mood. The patient is not nervous/anxious.        Objective:   /82   Pulse 76   Temp (!) 90.2 °F (32.3 °C)   Ht 5' 4" (1.626 m)   Wt 99.7 kg (219 lb 12.8 oz)   SpO2 99%   BMI 37.73 kg/m²   Physical Exam  Vitals reviewed.   Constitutional:       Appearance: Normal appearance. She is well-developed, well-groomed and normal weight.   HENT:      Head: Normocephalic and atraumatic.      Right Ear: Tympanic membrane and external ear normal.      Left Ear: Tympanic membrane and external ear normal.      Nose: Nose normal.      Mouth/Throat:      Mouth: Mucous membranes are dry.      Pharynx: Oropharynx is clear.   Eyes:      Conjunctiva/sclera: Conjunctivae normal.      Pupils: Pupils are equal, round, and reactive to light.   Neck:      Thyroid: No thyroid mass, thyromegaly or thyroid tenderness.      Vascular: No carotid bruit.   Cardiovascular:      Rate and Rhythm: Normal rate and regular rhythm.      Heart sounds: Normal heart sounds.   Pulmonary:      Effort: Pulmonary effort is normal.      Breath sounds: Normal breath sounds.   Abdominal:      General: Bowel sounds are normal. There is no distension.      Palpations: Abdomen is soft. There is no mass.      Tenderness: There is no abdominal tenderness. There is " no guarding or rebound.      Hernia: No hernia is present.   Musculoskeletal:      Cervical back: Normal range of motion.   Skin:     General: Skin is warm.   Neurological:      General: No focal deficit present.      Mental Status: She is alert and oriented to person, place, and time. Mental status is at baseline.   Psychiatric:         Attention and Perception: Attention and perception normal.         Mood and Affect: Mood and affect normal.         Speech: Speech normal.         Behavior: Behavior normal. Behavior is cooperative.         Thought Content: Thought content normal.         Judgment: Judgment normal.       Assessment:      1. Essential hypertension    2. Controlled type 2 diabetes mellitus without complication, without long-term current use of insulin    3. Hypothyroidism due to acquired atrophy of thyroid       Plan:   Essential hypertension  Comments:  chronic, cont. triamterine/hctz   Orders:  -     Comprehensive Metabolic Panel; Future; Expected date: 08/15/2022    Controlled type 2 diabetes mellitus without complication, without long-term current use of insulin  Comments:  chronic, controlled, check a1c, cont. metformin   Orders:  -     Hemoglobin A1C; Future; Expected date: 08/15/2022    Hypothyroidism due to acquired atrophy of thyroid  Comments:  chronic, check thyroid labs, adjust synthroid accordingly   Orders:  -     TSH; Future; Expected date: 08/15/2022  -     T4, Free; Future; Expected date: 08/15/2022      follow up in 6 mths w/ Dr. Krueger for chronic issues    Kim Griffith PA-C   Physician Assistant   [facility]

## 2022-08-16 LAB
T4 FREE SERPL-MCNC: 1.52 NG/DL (ref 0.71–1.51)
TSH SERPL DL<=0.005 MIU/L-ACNC: 0.57 UIU/ML (ref 0.4–4)

## 2022-08-25 DIAGNOSIS — Z78.0 MENOPAUSE: ICD-10-CM

## 2022-10-13 ENCOUNTER — OFFICE VISIT (OUTPATIENT)
Dept: URGENT CARE | Facility: CLINIC | Age: 67
End: 2022-10-13
Payer: MEDICARE

## 2022-10-13 VITALS
TEMPERATURE: 98 F | SYSTOLIC BLOOD PRESSURE: 125 MMHG | DIASTOLIC BLOOD PRESSURE: 70 MMHG | HEART RATE: 76 BPM | HEIGHT: 63 IN | RESPIRATION RATE: 16 BRPM | WEIGHT: 218 LBS | OXYGEN SATURATION: 96 % | BODY MASS INDEX: 38.62 KG/M2

## 2022-10-13 DIAGNOSIS — R09.89 RUNNY NOSE: Primary | ICD-10-CM

## 2022-10-13 LAB
CTP QC/QA: YES
SARS-COV-2 RDRP RESP QL NAA+PROBE: NEGATIVE

## 2022-10-13 PROCEDURE — U0002: ICD-10-PCS | Mod: QW,S$GLB,,

## 2022-10-13 PROCEDURE — 3061F NEG MICROALBUMINURIA REV: CPT | Mod: CPTII,S$GLB,,

## 2022-10-13 PROCEDURE — 99213 OFFICE O/P EST LOW 20 MIN: CPT | Mod: S$GLB,,,

## 2022-10-13 PROCEDURE — 3044F PR MOST RECENT HEMOGLOBIN A1C LEVEL <7.0%: ICD-10-PCS | Mod: CPTII,S$GLB,,

## 2022-10-13 PROCEDURE — 1159F MED LIST DOCD IN RCRD: CPT | Mod: CPTII,S$GLB,,

## 2022-10-13 PROCEDURE — 3066F NEPHROPATHY DOC TX: CPT | Mod: CPTII,S$GLB,,

## 2022-10-13 PROCEDURE — U0002 COVID-19 LAB TEST NON-CDC: HCPCS | Mod: QW,S$GLB,,

## 2022-10-13 PROCEDURE — 3074F SYST BP LT 130 MM HG: CPT | Mod: CPTII,S$GLB,,

## 2022-10-13 PROCEDURE — 3066F PR DOCUMENTATION OF TREATMENT FOR NEPHROPATHY: ICD-10-PCS | Mod: CPTII,S$GLB,,

## 2022-10-13 PROCEDURE — 1160F PR REVIEW ALL MEDS BY PRESCRIBER/CLIN PHARMACIST DOCUMENTED: ICD-10-PCS | Mod: CPTII,S$GLB,,

## 2022-10-13 PROCEDURE — 3078F PR MOST RECENT DIASTOLIC BLOOD PRESSURE < 80 MM HG: ICD-10-PCS | Mod: CPTII,S$GLB,,

## 2022-10-13 PROCEDURE — 3061F PR NEG MICROALBUMINURIA RESULT DOCUMENTED/REVIEW: ICD-10-PCS | Mod: CPTII,S$GLB,,

## 2022-10-13 PROCEDURE — 3074F PR MOST RECENT SYSTOLIC BLOOD PRESSURE < 130 MM HG: ICD-10-PCS | Mod: CPTII,S$GLB,,

## 2022-10-13 PROCEDURE — 1160F RVW MEDS BY RX/DR IN RCRD: CPT | Mod: CPTII,S$GLB,,

## 2022-10-13 PROCEDURE — 99213 PR OFFICE/OUTPT VISIT, EST, LEVL III, 20-29 MIN: ICD-10-PCS | Mod: S$GLB,,,

## 2022-10-13 PROCEDURE — 3078F DIAST BP <80 MM HG: CPT | Mod: CPTII,S$GLB,,

## 2022-10-13 PROCEDURE — 1159F PR MEDICATION LIST DOCUMENTED IN MEDICAL RECORD: ICD-10-PCS | Mod: CPTII,S$GLB,,

## 2022-10-13 PROCEDURE — 3044F HG A1C LEVEL LT 7.0%: CPT | Mod: CPTII,S$GLB,,

## 2022-10-13 NOTE — PROGRESS NOTES
"Subjective:       Patient ID: Anupama Mays Parent is a 67 y.o. female.    Vitals:  height is 5' 3" (1.6 m) and weight is 98.9 kg (218 lb). Her tympanic temperature is 97.9 °F (36.6 °C). Her blood pressure is 125/70 and her pulse is 76. Her respiration is 16 and oxygen saturation is 96%.     Chief Complaint: Sinus Problem    Pt c/o headache, runny nose, nasal congestion starting about 2 weeks ago.  Pt also c/o left ear pain.   Pt exposed to COVID through daughter.         Sinus Problem  This is a new problem. The current episode started 1 to 4 weeks ago. The problem has been gradually improving since onset. There has been no fever. Her pain is at a severity of 0/10. She is experiencing no pain. Associated symptoms include congestion, ear pain, headaches, sinus pressure and sneezing. Pertinent negatives include no chills, coughing, diaphoresis, hoarse voice, neck pain, shortness of breath, sore throat or swollen glands. Treatments tried: benadryl. The treatment provided no relief.     Constitution: Negative for chills and sweating.   HENT:  Positive for ear pain, congestion and sinus pressure. Negative for sore throat.    Neck: Negative for neck pain.   Respiratory:  Negative for cough and shortness of breath.    Allergic/Immunologic: Positive for sneezing.   Neurological:  Positive for headaches.     Objective:      Physical Exam   Constitutional: She is oriented to person, place, and time. She appears well-developed. She is cooperative.  Non-toxic appearance. She does not appear ill. No distress.   HENT:   Head: Normocephalic and atraumatic.   Ears:   Right Ear: Hearing, tympanic membrane, external ear and ear canal normal. No drainage, swelling or tenderness. No mastoid tenderness. Tympanic membrane is not injected, not scarred, not perforated, not erythematous, not retracted and not bulging. No middle ear effusion.   Left Ear: Hearing, tympanic membrane, external ear and ear canal normal. No drainage, swelling or " tenderness. No mastoid tenderness. Tympanic membrane is not injected, not scarred, not perforated, not erythematous, not retracted and not bulging.  No middle ear effusion.   Nose: Nose normal. No mucosal edema, rhinorrhea or nasal deformity. No epistaxis. Right sinus exhibits no maxillary sinus tenderness and no frontal sinus tenderness. Left sinus exhibits no maxillary sinus tenderness and no frontal sinus tenderness.   Mouth/Throat: Uvula is midline, oropharynx is clear and moist and mucous membranes are normal. No trismus in the jaw. Normal dentition. No uvula swelling. Cobblestoning present. No oropharyngeal exudate, posterior oropharyngeal edema, posterior oropharyngeal erythema or tonsillar abscesses. Tonsils are 1+ on the right. Tonsils are 1+ on the left. No tonsillar exudate.   Eyes: Conjunctivae and lids are normal. No scleral icterus.   Neck: Trachea normal and phonation normal. Neck supple. No edema present. No erythema present. No neck rigidity present.   Cardiovascular: Normal rate, regular rhythm, S1 normal, S2 normal, normal heart sounds and normal pulses.   Pulmonary/Chest: Effort normal and breath sounds normal. No accessory muscle usage or stridor. No apnea, no tachypnea and no bradypnea. No respiratory distress. She has no decreased breath sounds. She has no wheezes. She has no rhonchi. She has no rales.   Abdominal: Normal appearance.   Musculoskeletal: Normal range of motion.         General: No deformity. Normal range of motion.   Neurological: She is alert and oriented to person, place, and time. She exhibits normal muscle tone. Coordination normal.   Skin: Skin is warm, dry, intact, not diaphoretic and not pale.   Psychiatric: Her speech is normal and behavior is normal. Judgment and thought content normal.   Nursing note and vitals reviewed.      Assessment:       No diagnosis found.      Plan:         There are no diagnoses linked to this encounter.       Medical Decision Making:    Initial Assessment:   PT in room AAOX4, skin W/D, resp E/U, non toxic in appearance, NAD.    Urgent Care Management:  Patient reports having ear pain congestion sinus pressure sneezing headaches that developed 2 weeks ago but has resolved presently patient reports his here just to get a COVID test as patient is came in contact with COVID but having no symptoms presently.  Reviewed with patient to take Flonase or Claritin as directed for medication label for sinus congestion.  Discussed test results with patient patient verbalized understanding patient ambulatory from clinic in no acute distress.

## 2022-10-13 NOTE — PATIENT INSTRUCTIONS
Upper Respiratory Infection  If your condition worsens or fails to improve we recommend that you receive another evaluation at the ER immediately or contact your PCP to discuss your concerns or return here. You must understand that you've received an urgent care treatment only and that you may be released before all your medical problems are known or treated. You the patient will arrange for followup care as instructed.   If we discussed that I think your illness is viral it will not respond to antibiotics and it will last 10-14 days. However, if over the next few days the symptoms worsen start the antibiotics I have given you.    -  Flonase (fluticasone) is a nasal spray which is available over the counter and may help with your symptoms   -  Zyrtec D, Claritin D or allegra D can also help with symptoms of congestion and drainage.   -  If you just have drainage you can take plain Zyrtec, Claritin or Allegra    -  Rest and fluids are also important.   -  Tylenol or ibuprofen can also be used as directed for pain unless you have an allergy to them or medical condition such as stomach ulcers, kidney or liver disease or blood thinners etc for which you should not be taking these type of medications.   -  If you are flying in the next few days Afrin nose drops for the airplane flight upon take off and landing may help. Other than at those times refrain from using afrin.   -Take chloraseptic or cepacol for sore throat as directed by medication label directions  You can use a tbsp of honey to coat throat.  You can also try to use warm salt water gargles.

## 2022-10-16 ENCOUNTER — TELEPHONE (OUTPATIENT)
Dept: URGENT CARE | Facility: CLINIC | Age: 67
End: 2022-10-16
Payer: MEDICARE

## 2022-11-01 LAB
LEFT EYE DM RETINOPATHY: NEGATIVE
RIGHT EYE DM RETINOPATHY: NEGATIVE

## 2023-01-10 ENCOUNTER — PATIENT OUTREACH (OUTPATIENT)
Dept: ADMINISTRATIVE | Facility: HOSPITAL | Age: 68
End: 2023-01-10
Payer: MEDICARE

## 2023-02-15 ENCOUNTER — OFFICE VISIT (OUTPATIENT)
Dept: PRIMARY CARE CLINIC | Facility: CLINIC | Age: 68
End: 2023-02-15
Payer: MEDICARE

## 2023-02-15 ENCOUNTER — LAB VISIT (OUTPATIENT)
Dept: LAB | Facility: HOSPITAL | Age: 68
End: 2023-02-15
Attending: FAMILY MEDICINE
Payer: MEDICARE

## 2023-02-15 VITALS
HEIGHT: 63 IN | HEART RATE: 71 BPM | BODY MASS INDEX: 39.36 KG/M2 | DIASTOLIC BLOOD PRESSURE: 78 MMHG | WEIGHT: 222.13 LBS | TEMPERATURE: 98 F | SYSTOLIC BLOOD PRESSURE: 128 MMHG

## 2023-02-15 DIAGNOSIS — E11.9 TYPE 2 DIABETES MELLITUS WITHOUT COMPLICATION: ICD-10-CM

## 2023-02-15 DIAGNOSIS — K21.9 GASTROESOPHAGEAL REFLUX DISEASE, UNSPECIFIED WHETHER ESOPHAGITIS PRESENT: ICD-10-CM

## 2023-02-15 DIAGNOSIS — N18.30 SECONDARY DM WITH CKD STAGE 3 AND HYPERTENSION: ICD-10-CM

## 2023-02-15 DIAGNOSIS — F33.42 RECURRENT MAJOR DEPRESSION IN FULL REMISSION: Chronic | ICD-10-CM

## 2023-02-15 DIAGNOSIS — E66.01 SEVERE OBESITY (BMI 35.0-39.9) WITH COMORBIDITY: ICD-10-CM

## 2023-02-15 DIAGNOSIS — E13.22 SECONDARY DM WITH CKD STAGE 3 AND HYPERTENSION: ICD-10-CM

## 2023-02-15 DIAGNOSIS — N95.9 UNSPECIFIED MENOPAUSAL AND PERIMENOPAUSAL DISORDER: ICD-10-CM

## 2023-02-15 DIAGNOSIS — E78.2 MIXED DIABETIC HYPERLIPIDEMIA ASSOCIATED WITH TYPE 2 DIABETES MELLITUS: Chronic | ICD-10-CM

## 2023-02-15 DIAGNOSIS — I10 ESSENTIAL HYPERTENSION: Primary | Chronic | ICD-10-CM

## 2023-02-15 DIAGNOSIS — Z12.31 ENCOUNTER FOR SCREENING MAMMOGRAM FOR BREAST CANCER: ICD-10-CM

## 2023-02-15 DIAGNOSIS — E78.5 DYSLIPIDEMIA: ICD-10-CM

## 2023-02-15 DIAGNOSIS — I12.9 SECONDARY DM WITH CKD STAGE 3 AND HYPERTENSION: ICD-10-CM

## 2023-02-15 DIAGNOSIS — Z12.11 COLON CANCER SCREENING: ICD-10-CM

## 2023-02-15 DIAGNOSIS — E03.4 HYPOTHYROIDISM DUE TO ACQUIRED ATROPHY OF THYROID: Chronic | ICD-10-CM

## 2023-02-15 DIAGNOSIS — E11.69 MIXED DIABETIC HYPERLIPIDEMIA ASSOCIATED WITH TYPE 2 DIABETES MELLITUS: Chronic | ICD-10-CM

## 2023-02-15 DIAGNOSIS — I10 ESSENTIAL HYPERTENSION: Chronic | ICD-10-CM

## 2023-02-15 LAB
ALBUMIN/CREAT UR: NORMAL UG/MG (ref 0–30)
BASOPHILS # BLD AUTO: 0.05 K/UL (ref 0–0.2)
BASOPHILS NFR BLD: 0.7 % (ref 0–1.9)
CREAT UR-MCNC: 62 MG/DL (ref 15–325)
DIFFERENTIAL METHOD: NORMAL
EOSINOPHIL # BLD AUTO: 0.2 K/UL (ref 0–0.5)
EOSINOPHIL NFR BLD: 2.7 % (ref 0–8)
ERYTHROCYTE [DISTWIDTH] IN BLOOD BY AUTOMATED COUNT: 14.3 % (ref 11.5–14.5)
HCT VFR BLD AUTO: 40.8 % (ref 37–48.5)
HGB BLD-MCNC: 13.2 G/DL (ref 12–16)
IMM GRANULOCYTES # BLD AUTO: 0.02 K/UL (ref 0–0.04)
IMM GRANULOCYTES NFR BLD AUTO: 0.3 % (ref 0–0.5)
LYMPHOCYTES # BLD AUTO: 2.8 K/UL (ref 1–4.8)
LYMPHOCYTES NFR BLD: 37.5 % (ref 18–48)
MCH RBC QN AUTO: 29.9 PG (ref 27–31)
MCHC RBC AUTO-ENTMCNC: 32.4 G/DL (ref 32–36)
MCV RBC AUTO: 93 FL (ref 82–98)
MICROALBUMIN UR DL<=1MG/L-MCNC: <5 UG/ML
MONOCYTES # BLD AUTO: 0.5 K/UL (ref 0.3–1)
MONOCYTES NFR BLD: 6.7 % (ref 4–15)
NEUTROPHILS # BLD AUTO: 3.9 K/UL (ref 1.8–7.7)
NEUTROPHILS NFR BLD: 52.1 % (ref 38–73)
NRBC BLD-RTO: 0 /100 WBC
PLATELET # BLD AUTO: 204 K/UL (ref 150–450)
PMV BLD AUTO: 12.7 FL (ref 9.2–12.9)
RBC # BLD AUTO: 4.41 M/UL (ref 4–5.4)
WBC # BLD AUTO: 7.49 K/UL (ref 3.9–12.7)

## 2023-02-15 PROCEDURE — 1101F PT FALLS ASSESS-DOCD LE1/YR: CPT | Mod: CPTII,S$GLB,, | Performed by: FAMILY MEDICINE

## 2023-02-15 PROCEDURE — 3288F FALL RISK ASSESSMENT DOCD: CPT | Mod: CPTII,S$GLB,, | Performed by: FAMILY MEDICINE

## 2023-02-15 PROCEDURE — 1101F PR PT FALLS ASSESS DOC 0-1 FALLS W/OUT INJ PAST YR: ICD-10-PCS | Mod: CPTII,S$GLB,, | Performed by: FAMILY MEDICINE

## 2023-02-15 PROCEDURE — 1126F AMNT PAIN NOTED NONE PRSNT: CPT | Mod: CPTII,S$GLB,, | Performed by: FAMILY MEDICINE

## 2023-02-15 PROCEDURE — 83036 HEMOGLOBIN GLYCOSYLATED A1C: CPT | Performed by: FAMILY MEDICINE

## 2023-02-15 PROCEDURE — 99999 PR PBB SHADOW E&M-EST. PATIENT-LVL IV: ICD-10-PCS | Mod: PBBFAC,,, | Performed by: FAMILY MEDICINE

## 2023-02-15 PROCEDURE — 84443 ASSAY THYROID STIM HORMONE: CPT | Performed by: FAMILY MEDICINE

## 2023-02-15 PROCEDURE — 82570 ASSAY OF URINE CREATININE: CPT | Performed by: FAMILY MEDICINE

## 2023-02-15 PROCEDURE — 3008F BODY MASS INDEX DOCD: CPT | Mod: CPTII,S$GLB,, | Performed by: FAMILY MEDICINE

## 2023-02-15 PROCEDURE — 3074F SYST BP LT 130 MM HG: CPT | Mod: CPTII,S$GLB,, | Performed by: FAMILY MEDICINE

## 2023-02-15 PROCEDURE — 80053 COMPREHEN METABOLIC PANEL: CPT | Performed by: FAMILY MEDICINE

## 2023-02-15 PROCEDURE — 3288F PR FALLS RISK ASSESSMENT DOCUMENTED: ICD-10-PCS | Mod: CPTII,S$GLB,, | Performed by: FAMILY MEDICINE

## 2023-02-15 PROCEDURE — 80061 LIPID PANEL: CPT | Performed by: FAMILY MEDICINE

## 2023-02-15 PROCEDURE — 1159F MED LIST DOCD IN RCRD: CPT | Mod: CPTII,S$GLB,, | Performed by: FAMILY MEDICINE

## 2023-02-15 PROCEDURE — 1126F PR PAIN SEVERITY QUANTIFIED, NO PAIN PRESENT: ICD-10-PCS | Mod: CPTII,S$GLB,, | Performed by: FAMILY MEDICINE

## 2023-02-15 PROCEDURE — 1159F PR MEDICATION LIST DOCUMENTED IN MEDICAL RECORD: ICD-10-PCS | Mod: CPTII,S$GLB,, | Performed by: FAMILY MEDICINE

## 2023-02-15 PROCEDURE — 3078F DIAST BP <80 MM HG: CPT | Mod: CPTII,S$GLB,, | Performed by: FAMILY MEDICINE

## 2023-02-15 PROCEDURE — 36415 COLL VENOUS BLD VENIPUNCTURE: CPT | Mod: PN | Performed by: FAMILY MEDICINE

## 2023-02-15 PROCEDURE — 99214 OFFICE O/P EST MOD 30 MIN: CPT | Mod: S$GLB,,, | Performed by: FAMILY MEDICINE

## 2023-02-15 PROCEDURE — 85025 COMPLETE CBC W/AUTO DIFF WBC: CPT | Performed by: FAMILY MEDICINE

## 2023-02-15 PROCEDURE — 84439 ASSAY OF FREE THYROXINE: CPT | Performed by: FAMILY MEDICINE

## 2023-02-15 PROCEDURE — 99999 PR PBB SHADOW E&M-EST. PATIENT-LVL IV: CPT | Mod: PBBFAC,,, | Performed by: FAMILY MEDICINE

## 2023-02-15 PROCEDURE — 3078F PR MOST RECENT DIASTOLIC BLOOD PRESSURE < 80 MM HG: ICD-10-PCS | Mod: CPTII,S$GLB,, | Performed by: FAMILY MEDICINE

## 2023-02-15 PROCEDURE — 3074F PR MOST RECENT SYSTOLIC BLOOD PRESSURE < 130 MM HG: ICD-10-PCS | Mod: CPTII,S$GLB,, | Performed by: FAMILY MEDICINE

## 2023-02-15 PROCEDURE — 3008F PR BODY MASS INDEX (BMI) DOCUMENTED: ICD-10-PCS | Mod: CPTII,S$GLB,, | Performed by: FAMILY MEDICINE

## 2023-02-15 PROCEDURE — 99214 PR OFFICE/OUTPT VISIT, EST, LEVL IV, 30-39 MIN: ICD-10-PCS | Mod: S$GLB,,, | Performed by: FAMILY MEDICINE

## 2023-02-15 RX ORDER — LOVASTATIN 40 MG/1
40 TABLET ORAL NIGHTLY
Qty: 90 TABLET | Refills: 3 | Status: SHIPPED | OUTPATIENT
Start: 2023-02-15 | End: 2024-02-21 | Stop reason: SDUPTHER

## 2023-02-15 RX ORDER — PAROXETINE 10 MG/1
10 TABLET, FILM COATED ORAL DAILY
Qty: 90 TABLET | Refills: 3 | Status: SHIPPED | OUTPATIENT
Start: 2023-02-15 | End: 2024-02-21 | Stop reason: SDUPTHER

## 2023-02-15 RX ORDER — LEVOTHYROXINE SODIUM 137 UG/1
TABLET ORAL
Qty: 90 TABLET | Refills: 3 | Status: SHIPPED | OUTPATIENT
Start: 2023-02-15 | End: 2024-02-21 | Stop reason: SDUPTHER

## 2023-02-15 RX ORDER — METFORMIN HYDROCHLORIDE 500 MG/1
TABLET, EXTENDED RELEASE ORAL
Qty: 360 TABLET | Refills: 3 | Status: SHIPPED | OUTPATIENT
Start: 2023-02-15 | End: 2024-01-23

## 2023-02-15 RX ORDER — OMEPRAZOLE 40 MG/1
40 CAPSULE, DELAYED RELEASE ORAL DAILY
Qty: 90 CAPSULE | Refills: 3 | Status: SHIPPED | OUTPATIENT
Start: 2023-02-15 | End: 2024-02-21 | Stop reason: SDUPTHER

## 2023-02-15 RX ORDER — TRIAMTERENE AND HYDROCHLOROTHIAZIDE 75; 50 MG/1; MG/1
TABLET ORAL
Qty: 90 TABLET | Refills: 3 | Status: SHIPPED | OUTPATIENT
Start: 2023-02-15 | End: 2024-02-21 | Stop reason: SDUPTHER

## 2023-02-15 RX ORDER — PAROXETINE HYDROCHLORIDE 40 MG/1
40 TABLET, FILM COATED ORAL NIGHTLY
Qty: 90 TABLET | Refills: 3 | Status: SHIPPED | OUTPATIENT
Start: 2023-02-15 | End: 2024-02-21 | Stop reason: SDUPTHER

## 2023-02-15 RX ORDER — TRAZODONE HYDROCHLORIDE 50 MG/1
50 TABLET ORAL NIGHTLY
Qty: 90 TABLET | Refills: 3 | Status: SHIPPED | OUTPATIENT
Start: 2023-02-15 | End: 2024-02-21 | Stop reason: SDUPTHER

## 2023-02-15 NOTE — PROGRESS NOTES
"Subjective:      Patient ID: Anupama Mays Parent is a 67 y.o. female.    Chief Complaint: Follow-up      Patient is a 67 y.o. female coming in today  has a past medical history of Anxiety, Diabetes mellitus, GERD (gastroesophageal reflux disease), HLD (hyperlipidemia), HTN (hypertension), Hypothyroid, Insulin resistance, MDD (major depressive disorder), Obesity, Secondary DM with CKD stage 3 and hypertension, and Type 2 diabetes mellitus.    Pt presents to clinic for f/u and annual exam. Gaining some weight. Trying more veggies.   Currently on Omeprazole, Triamterene-HCTZ, Levothyroxine, Lovastatin, Trazodone, Metformin, and Paroxetine. She is due for PNA vaccine.  Declines. Reports feeling overall well. Pt requiring medication refills today.     [facility] Peq Documents    2/14/2023  7:52 AM CST - Filed by Patient   Would you like a copy of [facility]'s Financial Assistance Policy Summary? No, I would not like a copy.         Pmh, Psh, Family Hx, Social Hx, HM updated in Epic Tabs today.   Review of Systems   Constitutional:  Positive for unexpected weight change. Negative for chills, fatigue and fever.   HENT:  Negative for ear pain and trouble swallowing.    Eyes:  Negative for pain and visual disturbance.   Respiratory:  Negative for cough and shortness of breath.    Cardiovascular:  Negative for chest pain and leg swelling.   Gastrointestinal:  Negative for abdominal pain, blood in stool, nausea and vomiting.   Endocrine: Negative for cold intolerance and heat intolerance.   Genitourinary:  Negative for dysuria and frequency.   Musculoskeletal:  Negative for joint swelling, myalgias and neck pain.   Skin:  Negative for color change and rash.   Neurological:  Negative for dizziness and headaches.   Psychiatric/Behavioral:  Negative for behavioral problems and sleep disturbance.    Objective:     Vitals:    02/15/23 1042   BP: 128/78   Pulse: 71   Temp: 97.9 °F (36.6 °C)   Weight: 100.8 kg (222 lb 1.8 oz)   Height: 5' 3" " (1.6 m)     Wt Readings from Last 10 Encounters:   02/15/23 100.8 kg (222 lb 1.8 oz)   10/13/22 98.9 kg (218 lb)   08/15/22 99.7 kg (219 lb 12.8 oz)   02/14/22 96.1 kg (211 lb 12 oz)   01/18/22 96.2 kg (211 lb 15.6 oz)   11/18/21 98.4 kg (216 lb 14.9 oz)   08/24/21 98.9 kg (218 lb)   07/29/21 98.9 kg (218 lb 0.6 oz)   07/27/21 98.9 kg (218 lb 0.6 oz)   01/27/21 101.1 kg (222 lb 14.2 oz)     Physical Exam  Vitals reviewed.   Constitutional:       Appearance: Normal appearance. She is well-developed. She is obese.   HENT:      Head: Normocephalic and atraumatic.      Right Ear: Tympanic membrane and external ear normal.      Left Ear: Tympanic membrane and external ear normal.      Nose: Nose normal.      Mouth/Throat:      Mouth: Mucous membranes are moist.      Pharynx: Oropharynx is clear. No oropharyngeal exudate.   Eyes:      Extraocular Movements: Extraocular movements intact.      Conjunctiva/sclera: Conjunctivae normal.      Pupils: Pupils are equal, round, and reactive to light.   Neck:      Thyroid: No thyromegaly.   Cardiovascular:      Rate and Rhythm: Normal rate and regular rhythm.      Pulses:           Dorsalis pedis pulses are 2+ on the right side and 2+ on the left side.      Heart sounds: Normal heart sounds. No murmur heard.    No friction rub. No gallop.   Pulmonary:      Effort: Pulmonary effort is normal. No respiratory distress.      Breath sounds: Normal breath sounds. No wheezing or rales.   Abdominal:      General: Bowel sounds are normal. There is no distension.      Palpations: Abdomen is soft.      Tenderness: There is no abdominal tenderness. There is no rebound.   Musculoskeletal:         General: Normal range of motion.      Cervical back: Normal range of motion and neck supple.      Right foot: Normal range of motion. No deformity.      Left foot: Normal range of motion. No deformity.   Feet:      Right foot:      Protective Sensation: 4 sites tested.  4 sites sensed.      Skin  integrity: Warmth present. No ulcer, blister, skin breakdown, erythema, callus or dry skin.      Left foot:      Protective Sensation: 4 sites tested.  4 sites sensed.      Skin integrity: Warmth present. No ulcer, blister, skin breakdown, erythema, callus or dry skin.   Lymphadenopathy:      Cervical: No cervical adenopathy.   Skin:     General: Skin is warm and dry.      Findings: No erythema or rash.   Neurological:      General: No focal deficit present.      Mental Status: She is alert and oriented to person, place, and time. Mental status is at baseline.      Sensory: No sensory deficit.      Motor: No atrophy or abnormal muscle tone.   Psychiatric:         Attention and Perception: Attention and perception normal.         Mood and Affect: Mood and affect normal.         Speech: Speech normal.         Behavior: Behavior normal.         Thought Content: Thought content normal.         Cognition and Memory: Cognition and memory normal.         Judgment: Judgment normal.     Assessment:     1. Essential hypertension    2. Mixed diabetic hyperlipidemia associated with type 2 diabetes mellitus    3. Severe obesity (BMI 35.0-39.9) with comorbidity    4. Hypothyroidism due to acquired atrophy of thyroid    5. Gastroesophageal reflux disease, unspecified whether esophagitis present    6. Recurrent major depression in full remission    7. Secondary DM with CKD stage 3 and hypertension    8. Unspecified menopausal and perimenopausal disorder    9. Encounter for screening mammogram for breast cancer    10. Dyslipidemia    11. Colon cancer screening        LABS:   Lab Results   Component Value Date    HGBA1C 5.6 08/15/2022    HGBA1C 5.6 02/14/2022    HGBA1C 5.8 (H) 07/27/2021      Lab Results   Component Value Date    CHOL 164 02/14/2022    CHOL 194 07/27/2021    CHOL 160 01/27/2021     Lab Results   Component Value Date    LDLCALC 91.4 02/14/2022    LDLCALC 111.0 07/27/2021    LDLCALC 82.6 01/27/2021     Lab Results    Component Value Date    WBC 7.79 02/14/2022    HGB 13.5 02/14/2022    HCT 43.2 02/14/2022     02/14/2022    CHOL 164 02/14/2022    TRIG 103 02/14/2022    HDL 52 02/14/2022    ALT 17 08/15/2022    AST 21 08/15/2022     08/15/2022    K 4.0 08/15/2022     08/15/2022    CREATININE 0.8 08/15/2022    BUN 12 08/15/2022    CO2 25 08/15/2022    TSH 0.575 08/15/2022    HGBA1C 5.6 08/15/2022       The 10-year ASCVD risk score (Kevin LUIS, et al., 2019) is: 12.9%    Values used to calculate the score:      Age: 67 years      Sex: Female      Is Non- : No      Diabetic: Yes      Tobacco smoker: No      Systolic Blood Pressure: 128 mmHg      Is BP treated: Yes      HDL Cholesterol: 52 mg/dL      Total Cholesterol: 164 mg/dL    Mammo Digital Screening Bilat w/ Kiko  Narrative: Result:  Mammo Digital Screening Bilat w/ Kiko    History:  Patient is 66 y.o. and is seen for a screening mammogram.    Films Compared:  Prior images (if available) were compared.     Findings:   This procedure was performed using tomosynthesis.   Computer-aided detection was utilized in the interpretation of this   examination.    The breasts have scattered areas of fibroglandular density. There is no   evidence of suspicious masses, microcalcifications or architectural   distortion.  Impression:    No mammographic evidence of malignancy.    BI-RADS Category 1: Negative    Recommendation:  Routine screening mammogram in 1 year is recommended.    Your estimated lifetime risk of breast cancer (to age 85) based on   Tyrer-Cuzick risk assessment model is Tyrer-Cuzick: 6.45 %. According to   the American Cancer Society, patients with a lifetime breast cancer risk   of 20% or higher might benefit from supplemental screening tests.      Plan:   Anupama was seen today for follow-up.    Diagnoses and all orders for this visit:    Essential hypertension  -     TSH; Future  -     T4, Free; Future  -     Lipid Panel;  Future  -     Hemoglobin A1C; Future  -     Comprehensive Metabolic Panel; Future  -     CBC Auto Differential; Future  -     Microalbumin/Creatinine Ratio, Urine; Future    Mixed diabetic hyperlipidemia associated with type 2 diabetes mellitus  -     TSH; Future  -     T4, Free; Future  -     Lipid Panel; Future  -     Hemoglobin A1C; Future  -     Comprehensive Metabolic Panel; Future  -     CBC Auto Differential; Future  -     Microalbumin/Creatinine Ratio, Urine; Future    Severe obesity (BMI 35.0-39.9) with comorbidity  -     TSH; Future  -     T4, Free; Future  -     Lipid Panel; Future  -     Hemoglobin A1C; Future  -     Comprehensive Metabolic Panel; Future  -     CBC Auto Differential; Future  -     Microalbumin/Creatinine Ratio, Urine; Future    Hypothyroidism due to acquired atrophy of thyroid  -     TSH; Future  -     T4, Free; Future  -     Lipid Panel; Future  -     Hemoglobin A1C; Future  -     Comprehensive Metabolic Panel; Future  -     CBC Auto Differential; Future  -     Microalbumin/Creatinine Ratio, Urine; Future    Gastroesophageal reflux disease, unspecified whether esophagitis present  -     TSH; Future  -     T4, Free; Future  -     Lipid Panel; Future  -     Hemoglobin A1C; Future  -     Comprehensive Metabolic Panel; Future  -     CBC Auto Differential; Future  -     Microalbumin/Creatinine Ratio, Urine; Future    Recurrent major depression in full remission  -     TSH; Future  -     T4, Free; Future  -     Lipid Panel; Future  -     Hemoglobin A1C; Future  -     Comprehensive Metabolic Panel; Future  -     CBC Auto Differential; Future  -     Microalbumin/Creatinine Ratio, Urine; Future    Secondary DM with CKD stage 3 and hypertension  -     TSH; Future  -     T4, Free; Future  -     Lipid Panel; Future  -     Hemoglobin A1C; Future  -     Comprehensive Metabolic Panel; Future  -     CBC Auto Differential; Future  -     Microalbumin/Creatinine Ratio, Urine; Future    Unspecified  menopausal and perimenopausal disorder  -     DXA Bone Density Spine And Hip; Future    Encounter for screening mammogram for breast cancer  -     Mammo Digital Screening Bilat w/ Kiko; Future    Dyslipidemia  -     lovastatin (MEVACOR) 40 MG tablet; Take 1 tablet (40 mg total) by mouth nightly.    Colon cancer screening  -     Ambulatory referral/consult to Endo Procedure ; Future    Other orders  -     levothyroxine (SYNTHROID) 137 MCG Tab tablet; TAKE 1 TABLET BY MOUTH BEFORE BREAKFAST.  -     metFORMIN (GLUCOPHAGE-XR) 500 MG ER 24hr tablet; Take 4 tablets by mouth daily  -     omeprazole (PRILOSEC) 40 MG capsule; Take 1 capsule (40 mg total) by mouth once daily.  -     paroxetine (PAXIL) 10 MG tablet; Take 1 tablet (10 mg total) by mouth once daily. With the 40mg for total daily dose of 50mg in 24 hrs.  -     paroxetine (PAXIL) 40 MG tablet; Take 1 tablet (40 mg total) by mouth every evening. With the 10mg for total daily dose of 50mg in 24 hrs.  -     traZODone (DESYREL) 50 MG tablet; Take 1 tablet (50 mg total) by mouth every evening.  -     triamterene-hydrochlorothiazide 75-50 mg (MAXZIDE) 75-50 mg per tablet; TAKE 1 TABLET BY MOUTH ONE TIME DAILY      - above diagnoses were discussed and reviewed today during visit. The conditions are stable.  Continue with current medications and interventions.   Rx refills for chronic conditions as stated above.  DXA scan and mammogram ordered to be completed at pt's convenience.  Referral given to schedule colonoscopy.  Pt declined any further vaccines today.   Lab work ordered to be completed today.  Instructed to f/u in 1 year.      There are no Patient Instructions on file for this visit.    Follow up in about 1 year (around 2/15/2024) for physical with Dr NUNES.  [place] Attestation:   ITao, am scribing for, and in the presence of, Dr. Kim Nunes MD. I performed the above scribed service and the documentation accurately describes the services  I performed. I attest to the accuracy of the note.    I, Dr. Kim Krueger MD, reviewed documentation as scribed above. I personally performed the services described in this documentation.  I agree that the record reflects my personal performance and is accurate and complete. Kim Krueger MD.    02/15/2023

## 2023-02-16 LAB
ALBUMIN SERPL BCP-MCNC: 3.8 G/DL (ref 3.5–5.2)
ALP SERPL-CCNC: 96 U/L (ref 55–135)
ALT SERPL W/O P-5'-P-CCNC: 27 U/L (ref 10–44)
ANION GAP SERPL CALC-SCNC: 14 MMOL/L (ref 8–16)
AST SERPL-CCNC: 33 U/L (ref 10–40)
BILIRUB SERPL-MCNC: 0.5 MG/DL (ref 0.1–1)
BUN SERPL-MCNC: 14 MG/DL (ref 8–23)
CALCIUM SERPL-MCNC: 10.1 MG/DL (ref 8.7–10.5)
CHLORIDE SERPL-SCNC: 100 MMOL/L (ref 95–110)
CHOLEST SERPL-MCNC: 169 MG/DL (ref 120–199)
CHOLEST/HDLC SERPL: 3 {RATIO} (ref 2–5)
CO2 SERPL-SCNC: 25 MMOL/L (ref 23–29)
CREAT SERPL-MCNC: 0.9 MG/DL (ref 0.5–1.4)
EST. GFR  (NO RACE VARIABLE): >60 ML/MIN/1.73 M^2
ESTIMATED AVG GLUCOSE: 123 MG/DL (ref 68–131)
GLUCOSE SERPL-MCNC: 101 MG/DL (ref 70–110)
HBA1C MFR BLD: 5.9 % (ref 4–5.6)
HDLC SERPL-MCNC: 56 MG/DL (ref 40–75)
HDLC SERPL: 33.1 % (ref 20–50)
LDLC SERPL CALC-MCNC: 90.2 MG/DL (ref 63–159)
NONHDLC SERPL-MCNC: 113 MG/DL
POTASSIUM SERPL-SCNC: 4 MMOL/L (ref 3.5–5.1)
PROT SERPL-MCNC: 7.5 G/DL (ref 6–8.4)
SODIUM SERPL-SCNC: 139 MMOL/L (ref 136–145)
T4 FREE SERPL-MCNC: 1.43 NG/DL (ref 0.71–1.51)
TRIGL SERPL-MCNC: 114 MG/DL (ref 30–150)
TSH SERPL DL<=0.005 MIU/L-ACNC: 1.39 UIU/ML (ref 0.4–4)

## 2023-03-28 ENCOUNTER — PATIENT MESSAGE (OUTPATIENT)
Dept: GASTROENTEROLOGY | Facility: CLINIC | Age: 68
End: 2023-03-28
Payer: MEDICARE

## 2023-03-29 ENCOUNTER — HOSPITAL ENCOUNTER (OUTPATIENT)
Dept: RADIOLOGY | Facility: HOSPITAL | Age: 68
Discharge: HOME | End: 2023-03-29
Attending: FAMILY MEDICINE
Payer: MEDICARE

## 2023-03-29 DIAGNOSIS — Z12.31 ENCOUNTER FOR SCREENING MAMMOGRAM FOR BREAST CANCER: ICD-10-CM

## 2023-03-29 PROCEDURE — 77063 MAMMO DIGITAL SCREENING BILAT WITH TOMO: ICD-10-PCS | Mod: 26,,, | Performed by: RADIOLOGY

## 2023-03-29 PROCEDURE — 77063 BREAST TOMOSYNTHESIS BI: CPT | Mod: 26,,, | Performed by: RADIOLOGY

## 2023-03-29 PROCEDURE — 77067 SCR MAMMO BI INCL CAD: CPT | Mod: 26,,, | Performed by: RADIOLOGY

## 2023-03-29 PROCEDURE — 77067 SCR MAMMO BI INCL CAD: CPT | Mod: TC,PN

## 2023-03-29 PROCEDURE — 77067 MAMMO DIGITAL SCREENING BILAT WITH TOMO: ICD-10-PCS | Mod: 26,,, | Performed by: RADIOLOGY

## 2023-03-31 ENCOUNTER — PATIENT MESSAGE (OUTPATIENT)
Dept: GASTROENTEROLOGY | Facility: CLINIC | Age: 68
End: 2023-03-31
Payer: MEDICARE

## 2023-04-10 ENCOUNTER — HOSPITAL ENCOUNTER (OUTPATIENT)
Dept: PREADMISSION TESTING | Facility: HOSPITAL | Age: 68
Discharge: HOME | End: 2023-04-10
Attending: INTERNAL MEDICINE
Payer: MEDICARE

## 2023-04-10 DIAGNOSIS — Z12.11 SCREENING FOR COLON CANCER: Primary | ICD-10-CM

## 2023-04-10 RX ORDER — SODIUM, POTASSIUM,MAG SULFATES 17.5-3.13G
1 SOLUTION, RECONSTITUTED, ORAL ORAL DAILY
Qty: 1 KIT | Refills: 0 | Status: CANCELLED | OUTPATIENT
Start: 2023-04-10 | End: 2023-04-12

## 2023-04-11 RX ORDER — SOD SULF/POT CHLORIDE/MAG SULF 1.479 G
12 TABLET ORAL DAILY
Qty: 24 TABLET | Refills: 0 | Status: SHIPPED | OUTPATIENT
Start: 2023-04-11 | End: 2024-02-21

## 2023-04-20 ENCOUNTER — PATIENT MESSAGE (OUTPATIENT)
Dept: PRIMARY CARE CLINIC | Facility: CLINIC | Age: 68
End: 2023-04-20
Payer: MEDICARE

## 2023-04-20 RX ORDER — LANCETS 28 GAUGE
1 EACH MISCELLANEOUS
Qty: 300 EACH | Refills: 3 | Status: SHIPPED | OUTPATIENT
Start: 2023-04-20

## 2023-04-20 NOTE — TELEPHONE ENCOUNTER
No new care gaps identified.  [facility] Embedded Care Gaps. Reference number: [id number]. 4/20/2023   1:46:40 PM CDT

## 2023-04-25 ENCOUNTER — PATIENT MESSAGE (OUTPATIENT)
Dept: PRIMARY CARE CLINIC | Facility: CLINIC | Age: 68
End: 2023-04-25
Payer: MEDICARE

## 2023-04-25 RX ORDER — INSULIN PUMP SYRINGE, 3 ML
EACH MISCELLANEOUS
Qty: 1 EACH | Refills: 0 | Status: SHIPPED | OUTPATIENT
Start: 2023-04-25

## 2023-04-26 ENCOUNTER — TELEPHONE (OUTPATIENT)
Dept: PRIMARY CARE CLINIC | Facility: CLINIC | Age: 68
End: 2023-04-26
Payer: MEDICARE

## 2023-04-26 NOTE — TELEPHONE ENCOUNTER
Tried calling patient back in reference to her message. I didn't leave a vm. Send patient a message thru the portal. Waiting on respond

## 2023-05-01 ENCOUNTER — ANESTHESIA EVENT (OUTPATIENT)
Dept: ENDOSCOPY | Facility: HOSPITAL | Age: 68
End: 2023-05-01
Payer: MEDICARE

## 2023-05-01 NOTE — ANESTHESIA PREPROCEDURE EVALUATION
"                                                                                                             05/01/2023  Anupama Olivo is a 68 y.o., female.    Past Medical History:   Diagnosis Date    Anxiety     Diabetes mellitus     Pre Diabetes    GERD (gastroesophageal reflux disease)     HLD (hyperlipidemia)     HTN (hypertension)     Hypothyroid     Insulin resistance     MDD (major depressive disorder)     Obesity     Secondary DM with CKD stage 3 and hypertension 02/15/2023    Type 2 diabetes mellitus        Past Surgical History:   Procedure Laterality Date    APPENDECTOMY  1982    BARTHOLIN GLAND CYST EXCISION Left 05/12/2010    Dr ABDOULAYE Fang    BREAST BIOPSY Right     20 years ago. benign.    BREAST SURGERY      CATARACT EXTRACTION EXTRACAPSULAR W/ INTRAOCULAR LENS IMPLANTATION Bilateral     CHOLECYSTECTOMY  11/12/2014    lap choly for biliary dyskinesia    COLON SURGERY      COLONOSCOPY N/A 01/18/2022    Procedure: COLONOSCOPY;  Surgeon: Aliyah Otoole MD;  [facility]: [facility];  Service: Endoscopy;  Laterality: N/A;    EYE SURGERY      FRACTURE SURGERY      HYSTERECTOMY      OOPHORECTOMY      ORIF WRIST FRACTURE  2009    comminuted intraarticular fx of distal Lt radius w/assoc avulsion fx styloid process of ulna - "metalplate    rectocele   repair       Normal sinus rhythm   Normal ECG   When compared with ECG of 13-FEB-2017 15:12,   No significant change was found   Confirmed by CHINO ARIAS [facility] ([id number]) on 6/27/2017 6:21:22 PM     Pre-op Assessment    I have reviewed the Patient Summary Reports.     I have reviewed the Nursing Notes. I have reviewed the NPO Status.   I have reviewed the Medications.     Review of Systems  Anesthesia Hx:  No problems with previous Anesthesia  History of prior surgery of interest to airway management or planning: Previous anesthesia: General   Social:  Non-Smoker    Cardiovascular:   Hypertension    Renal/:   Chronic Renal Disease, CKD  "   Hepatic/GI:   GERD    Endocrine:   Diabetes, type 2 Hypothyroidism  Obesity / BMI > 30  Psych:   Psychiatric History depression          Physical Exam  General: Alert and Oriented    Airway:  Mallampati: II   Mouth Opening: Normal  TM Distance: Normal  Tongue: Normal  Neck ROM: Normal ROM    Dental:  Dentures    Chest/Lungs:  Clear to auscultation, Normal Respiratory Rate    Heart:  Rate: Normal  Rhythm: Regular Rhythm        Anesthesia Plan  Type of Anesthesia, risks & benefits discussed:    Anesthesia Type: Gen Natural Airway  Intra-op Monitoring Plan: Standard ASA Monitors  Induction:  IV  Informed Consent: Informed consent signed with the Patient and all parties understand the risks and agree with anesthesia plan.  All questions answered.   ASA Score: 2  Day of Surgery Review of History & Physical: H&P Update referred to the surgeon/provider.    Ready For Surgery From Anesthesia Perspective.     .

## 2023-05-02 ENCOUNTER — OFFICE VISIT (OUTPATIENT)
Dept: PRIMARY CARE CLINIC | Facility: CLINIC | Age: 68
End: 2023-05-02
Payer: MEDICARE

## 2023-05-02 VITALS
HEIGHT: 63 IN | DIASTOLIC BLOOD PRESSURE: 79 MMHG | HEART RATE: 73 BPM | BODY MASS INDEX: 39.84 KG/M2 | WEIGHT: 224.88 LBS | SYSTOLIC BLOOD PRESSURE: 120 MMHG | OXYGEN SATURATION: 97 % | TEMPERATURE: 99 F

## 2023-05-02 DIAGNOSIS — E11.9 DIABETES MELLITUS WITH COINCIDENT HYPERTENSION: ICD-10-CM

## 2023-05-02 DIAGNOSIS — S02.85XD CLOSED FRACTURE OF ORBIT WITH ROUTINE HEALING, SUBSEQUENT ENCOUNTER: Primary | ICD-10-CM

## 2023-05-02 DIAGNOSIS — I10 DIABETES MELLITUS WITH COINCIDENT HYPERTENSION: ICD-10-CM

## 2023-05-02 DIAGNOSIS — S09.93XS FACIAL TRAUMA, SEQUELA: ICD-10-CM

## 2023-05-02 PROCEDURE — 3061F PR NEG MICROALBUMINURIA RESULT DOCUMENTED/REVIEW: ICD-10-PCS | Mod: CPTII,S$GLB,, | Performed by: INTERNAL MEDICINE

## 2023-05-02 PROCEDURE — 3008F PR BODY MASS INDEX (BMI) DOCUMENTED: ICD-10-PCS | Mod: CPTII,S$GLB,, | Performed by: INTERNAL MEDICINE

## 2023-05-02 PROCEDURE — 3074F PR MOST RECENT SYSTOLIC BLOOD PRESSURE < 130 MM HG: ICD-10-PCS | Mod: CPTII,S$GLB,, | Performed by: INTERNAL MEDICINE

## 2023-05-02 PROCEDURE — 99999 PR PBB SHADOW E&M-EST. PATIENT-LVL V: CPT | Mod: PBBFAC,,, | Performed by: INTERNAL MEDICINE

## 2023-05-02 PROCEDURE — 1160F RVW MEDS BY RX/DR IN RCRD: CPT | Mod: CPTII,S$GLB,, | Performed by: INTERNAL MEDICINE

## 2023-05-02 PROCEDURE — 99213 PR OFFICE/OUTPT VISIT, EST, LEVL III, 20-29 MIN: ICD-10-PCS | Mod: S$GLB,,, | Performed by: INTERNAL MEDICINE

## 2023-05-02 PROCEDURE — 3066F NEPHROPATHY DOC TX: CPT | Mod: CPTII,S$GLB,, | Performed by: INTERNAL MEDICINE

## 2023-05-02 PROCEDURE — 3008F BODY MASS INDEX DOCD: CPT | Mod: CPTII,S$GLB,, | Performed by: INTERNAL MEDICINE

## 2023-05-02 PROCEDURE — 3066F PR DOCUMENTATION OF TREATMENT FOR NEPHROPATHY: ICD-10-PCS | Mod: CPTII,S$GLB,, | Performed by: INTERNAL MEDICINE

## 2023-05-02 PROCEDURE — 1159F PR MEDICATION LIST DOCUMENTED IN MEDICAL RECORD: ICD-10-PCS | Mod: CPTII,S$GLB,, | Performed by: INTERNAL MEDICINE

## 2023-05-02 PROCEDURE — 3044F PR MOST RECENT HEMOGLOBIN A1C LEVEL <7.0%: ICD-10-PCS | Mod: CPTII,S$GLB,, | Performed by: INTERNAL MEDICINE

## 2023-05-02 PROCEDURE — 99213 OFFICE O/P EST LOW 20 MIN: CPT | Mod: S$GLB,,, | Performed by: INTERNAL MEDICINE

## 2023-05-02 PROCEDURE — 1159F MED LIST DOCD IN RCRD: CPT | Mod: CPTII,S$GLB,, | Performed by: INTERNAL MEDICINE

## 2023-05-02 PROCEDURE — 3078F PR MOST RECENT DIASTOLIC BLOOD PRESSURE < 80 MM HG: ICD-10-PCS | Mod: CPTII,S$GLB,, | Performed by: INTERNAL MEDICINE

## 2023-05-02 PROCEDURE — 3078F DIAST BP <80 MM HG: CPT | Mod: CPTII,S$GLB,, | Performed by: INTERNAL MEDICINE

## 2023-05-02 PROCEDURE — 3061F NEG MICROALBUMINURIA REV: CPT | Mod: CPTII,S$GLB,, | Performed by: INTERNAL MEDICINE

## 2023-05-02 PROCEDURE — 1160F PR REVIEW ALL MEDS BY PRESCRIBER/CLIN PHARMACIST DOCUMENTED: ICD-10-PCS | Mod: CPTII,S$GLB,, | Performed by: INTERNAL MEDICINE

## 2023-05-02 PROCEDURE — 99999 PR PBB SHADOW E&M-EST. PATIENT-LVL V: ICD-10-PCS | Mod: PBBFAC,,, | Performed by: INTERNAL MEDICINE

## 2023-05-02 PROCEDURE — 3074F SYST BP LT 130 MM HG: CPT | Mod: CPTII,S$GLB,, | Performed by: INTERNAL MEDICINE

## 2023-05-02 PROCEDURE — 3044F HG A1C LEVEL LT 7.0%: CPT | Mod: CPTII,S$GLB,, | Performed by: INTERNAL MEDICINE

## 2023-05-02 NOTE — PROGRESS NOTES
"Subjective     Patient ID: Anupama Olivo is a 68 y.o. female.    Chief Complaint: Follow-up (Fall happened on April 20th,went to the ER for eye socket. )      HPI  F/u ER visit at [facility] for right periorbital fx (see rad report below) end of April.  Feeling well.  Has cscope tomorrow.  Reports no pain at all; only slight TTP.  Completed abx.  Reports was urinating, had gas pain, describes Valsalva type maneuver and possible LOC vs "may have fell asleep".  Went about her day but sought eval when noted swelling.      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 benign.    BREAST SURGERY      CATARACT EXTRACTION EXTRACAPSULAR W/ INTRAOCULAR LENS IMPLANTATION Bilateral     CHOLECYSTECTOMY  11/12/2014    lap choly for biliary dyskinesia    COLON SURGERY      COLONOSCOPY N/A 01/18/2022    Procedure: COLONOSCOPY;  Surgeon: Aliyah Otoole MD;  Location: [facility];  Service: Endoscopy;  Laterality: N/A;    EYE SURGERY      FRACTURE SURGERY      HYSTERECTOMY      OOPHORECTOMY      ORIF WRIST FRACTURE  2009    comminuted intraarticular fx of distal Lt radius w/assoc avulsion fx styloid process of ulna - " ""metalplate    rectocele   repair       Family History   Problem Relation Age of Onset    Diabetes Other     Colon cancer Maternal Grandmother     Colon cancer Cousin     Colon cancer Cousin     Breast cancer Neg Hx      Social History     Socioeconomic History    Marital status:     Number of children: 2   Occupational History    Occupation: retired   Tobacco Use    Smoking status: Never    Smokeless tobacco: Never   Substance and Sexual Activity    Alcohol use: Not Currently     Alcohol/week: 0.0 standard drinks     Comment: rarely // once a year    Drug use: Never    Sexual activity: Not Currently     Partners: Male     Birth control/protection: None         /79   Pulse 73   Temp 98.5 °F (36.9 °C)   Ht 5' 3" (1.6 m)   Wt 102 kg (224 lb 13.9 oz)   SpO2 97%   BMI 39.83 kg/m²   Outpatient Medications as of 5/2/2023   Medication Sig Dispense Refill    aspirin (ECOTRIN) 81 MG EC tablet Take 1 tablet by mouth Daily.      blood sugar diagnostic (TRUETEST TEST STRIPS) Strp 1 strip by Misc.(Non-Drug; Combo Route) route 4 (four) times daily with meals and nightly. [employer] True Metrix Test Strips 300 strip 3    blood-glucose meter (TRUE METRIX AIR GLUCOSE METER) kit USE AS DIRECTED 1 each 0    lancets (TRUEPLUS LANCETS) 28 gauge Misc 1 lancet by Misc.(Non-Drug; Combo Route) route 4 (four) times daily with meals and nightly. [employer] brand 300 each 3    levothyroxine (SYNTHROID) 137 MCG Tab tablet TAKE 1 TABLET BY MOUTH BEFORE BREAKFAST. 90 tablet 3    lovastatin (MEVACOR) 40 MG tablet Take 1 tablet (40 mg total) by mouth nightly. 90 tablet 3    metFORMIN (GLUCOPHAGE-XR) 500 MG ER 24hr tablet Take 4 tablets by mouth daily 360 tablet 3    omeprazole (PRILOSEC) 40 MG capsule Take 1 capsule (40 mg total) by mouth once daily. 90 capsule 3    paroxetine (PAXIL) 10 MG tablet Take 1 tablet (10 mg total) by mouth once daily. With the 40mg for total daily dose of 50mg in 24 hrs. 90 tablet 3    paroxetine (PAXIL) 40 " MG tablet Take 1 tablet (40 mg total) by mouth every evening. With the 10mg for total daily dose of 50mg in 24 hrs. 90 tablet 3    sod sulf-pot chloride-mag sulf (SUTAB) 1.479-0.188- 0.225 gram tablet Take 12 tablets by mouth once daily. Take according to package instructions with indicated amount of water. 24 tablet 0    traZODone (DESYREL) 50 MG tablet Take 1 tablet (50 mg total) by mouth every evening. 90 tablet 3    triamterene-hydrochlorothiazide 75-50 mg (MAXZIDE) 75-50 mg per tablet TAKE 1 TABLET BY MOUTH ONE TIME DAILY 90 tablet 3     No current facility-administered medications on file as of 5/2/2023.     Telma Schroeder MD - 04/20/2023    Formatting of this note might be different from the original.  EXAM: CT SINUS / FACIAL BONES WO CONTRAST, 4/20/2023 2:25 PM    COMPARISON: None    HISTORY: right facial/periorbital swelling, fall    TECHNIQUE: Helically acquired axial CT images were obtained of the face without IV contrast. Coronal, sagittal, and 3-D volume rendered reformatted images were made.    Automated exposure control was used for dose reduction.    FINDINGS:  There is an acute traumatic medial right orbital floor fracture with depression with fat extending through the defect. Inferior rectus muscle is slightly rounded near the fracture site but does not extend through the fracture defect. No retrobulbar hemorrhage. The globes appear intact although both globes have a slightly elongated configuration which is presumably chronic and unrelated to the trauma given the symmetry. Previous bilateral cataract surgery. There is gas throughout the right periorbital soft tissues. No radiopaque foreign body seen.    Mandible is intact and located. No other facial fracture is seen. Small amount of layering blood within the right maxillary sinus. Remainder of the paranasal sinuses are clear. Middle ears and mastoid air cells are clear.    IMPRESSION:  Acute traumatic medial right orbital floor fracture  with fat herniating through the defect and gas in the periorbital soft tissues.    Review of Systems   HENT:  Negative for tinnitus.    Eyes:  Negative for photophobia, pain, discharge, redness, itching and visual disturbance.   Cardiovascular:  Negative for chest pain and palpitations.   All other systems reviewed and are negative.       Objective     Physical Exam  Constitutional:       General: She is not in acute distress.     Appearance: Normal appearance. She is obese. She is not ill-appearing, toxic-appearing or diaphoretic.   HENT:      Head: Normocephalic.      Nose: Nose normal.      Mouth/Throat:      Mouth: Mucous membranes are moist.   Eyes:      Extraocular Movements: Extraocular movements intact.      Conjunctiva/sclera: Conjunctivae normal.      Comments: No right periorbital edema  Slight ecchymosis noted medial right orbit   Cardiovascular:      Rate and Rhythm: Normal rate.   Neurological:      Mental Status: She is alert.          1. Closed fracture of orbit with routine healing, subsequent encounter  Comments:  all swelling resolved; no pain at this time; completed Abx course  Orders:  -     X-Ray Facial Bones  3 Or More View; Future; Expected date: 05/02/2023    2. Facial trauma, sequela  Comments:  f/u xray to ensure proper healing  Orders:  -     X-Ray Facial Bones  3 Or More View; Future; Expected date: 05/02/2023    3. Diabetes mellitus with coincident hypertension  Comments:  sees primary regularly; bp controlled        Follow up if symptoms worsen or fail to improve.      Immunization History   Administered Date(s) Administered    COVID-19, MRNA, LN-S, PF (Pfizer) (Purple Cap) 07/29/2021, 08/19/2021    Pneumococcal Polysaccharide - 23 Valent 02/14/2022

## 2023-05-03 ENCOUNTER — ANESTHESIA (OUTPATIENT)
Dept: ENDOSCOPY | Facility: HOSPITAL | Age: 68
End: 2023-05-03
Payer: MEDICARE

## 2023-05-03 ENCOUNTER — HOSPITAL ENCOUNTER (OUTPATIENT)
Facility: HOSPITAL | Age: 68
Discharge: HOME | End: 2023-05-03
Attending: INTERNAL MEDICINE | Admitting: INTERNAL MEDICINE
Payer: MEDICARE

## 2023-05-03 VITALS
OXYGEN SATURATION: 100 % | DIASTOLIC BLOOD PRESSURE: 58 MMHG | BODY MASS INDEX: 37.83 KG/M2 | HEIGHT: 64 IN | HEART RATE: 63 BPM | WEIGHT: 221.56 LBS | SYSTOLIC BLOOD PRESSURE: 123 MMHG | RESPIRATION RATE: 20 BRPM | TEMPERATURE: 97 F

## 2023-05-03 DIAGNOSIS — Z86.010 HISTORY OF COLON POLYPS: Primary | ICD-10-CM

## 2023-05-03 LAB — POCT GLUCOSE: 122 MG/DL (ref 70–110)

## 2023-05-03 PROCEDURE — 27200997: Performed by: INTERNAL MEDICINE

## 2023-05-03 PROCEDURE — D9220A PRA ANESTHESIA: ICD-10-PCS | Mod: PT,ANES,, | Performed by: ANESTHESIOLOGY

## 2023-05-03 PROCEDURE — 37000009 HC ANESTHESIA EA ADD 15 MINS: Performed by: INTERNAL MEDICINE

## 2023-05-03 PROCEDURE — 88305 TISSUE EXAM BY PATHOLOGIST: ICD-10-PCS | Mod: 26,,, | Performed by: PATHOLOGY

## 2023-05-03 PROCEDURE — 27201089 HC SNARE, DISP (ANY): Performed by: INTERNAL MEDICINE

## 2023-05-03 PROCEDURE — 45385 PR COLONOSCOPY,REMV LESN,SNARE: ICD-10-PCS | Mod: PT,,, | Performed by: INTERNAL MEDICINE

## 2023-05-03 PROCEDURE — 63600175 PHARM REV CODE 636 W HCPCS: Performed by: INTERNAL MEDICINE

## 2023-05-03 PROCEDURE — D9220A PRA ANESTHESIA: Mod: PT,ANES,, | Performed by: ANESTHESIOLOGY

## 2023-05-03 PROCEDURE — 45385 COLONOSCOPY W/LESION REMOVAL: CPT | Mod: PT | Performed by: INTERNAL MEDICINE

## 2023-05-03 PROCEDURE — 88305 TISSUE EXAM BY PATHOLOGIST: CPT | Performed by: PATHOLOGY

## 2023-05-03 PROCEDURE — 25000003 PHARM REV CODE 250: Performed by: NURSE ANESTHETIST, CERTIFIED REGISTERED

## 2023-05-03 PROCEDURE — 45385 COLONOSCOPY W/LESION REMOVAL: CPT | Mod: PT,,, | Performed by: INTERNAL MEDICINE

## 2023-05-03 PROCEDURE — D9220A PRA ANESTHESIA: Mod: PT,CRNA,, | Performed by: NURSE ANESTHETIST, CERTIFIED REGISTERED

## 2023-05-03 PROCEDURE — 82962 GLUCOSE BLOOD TEST: CPT | Performed by: INTERNAL MEDICINE

## 2023-05-03 PROCEDURE — 63600175 PHARM REV CODE 636 W HCPCS: Performed by: NURSE ANESTHETIST, CERTIFIED REGISTERED

## 2023-05-03 PROCEDURE — D9220A PRA ANESTHESIA: ICD-10-PCS | Mod: PT,CRNA,, | Performed by: NURSE ANESTHETIST, CERTIFIED REGISTERED

## 2023-05-03 PROCEDURE — 37000008 HC ANESTHESIA 1ST 15 MINUTES: Performed by: INTERNAL MEDICINE

## 2023-05-03 PROCEDURE — 88305 TISSUE EXAM BY PATHOLOGIST: CPT | Mod: 26,,, | Performed by: PATHOLOGY

## 2023-05-03 RX ORDER — SODIUM CHLORIDE, SODIUM LACTATE, POTASSIUM CHLORIDE, CALCIUM CHLORIDE 600; 310; 30; 20 MG/100ML; MG/100ML; MG/100ML; MG/100ML
INJECTION, SOLUTION INTRAVENOUS CONTINUOUS
Status: DISCONTINUED | OUTPATIENT
Start: 2023-05-03 | End: 2023-05-03 | Stop reason: HOSPADM

## 2023-05-03 RX ORDER — LIDOCAINE HYDROCHLORIDE 20 MG/ML
INJECTION, SOLUTION EPIDURAL; INFILTRATION; INTRACAUDAL; PERINEURAL
Status: DISCONTINUED | OUTPATIENT
Start: 2023-05-03 | End: 2023-05-03

## 2023-05-03 RX ORDER — PROPOFOL 10 MG/ML
VIAL (ML) INTRAVENOUS
Status: DISCONTINUED | OUTPATIENT
Start: 2023-05-03 | End: 2023-05-03

## 2023-05-03 RX ADMIN — LIDOCAINE HYDROCHLORIDE 50 MG: 20 INJECTION, SOLUTION INTRAVENOUS at 10:05

## 2023-05-03 RX ADMIN — PROPOFOL 20 MG: 10 INJECTION, EMULSION INTRAVENOUS at 10:05

## 2023-05-03 RX ADMIN — PROPOFOL 10 MG: 10 INJECTION, EMULSION INTRAVENOUS at 10:05

## 2023-05-03 RX ADMIN — PROPOFOL 70 MG: 10 INJECTION, EMULSION INTRAVENOUS at 10:05

## 2023-05-03 RX ADMIN — SODIUM CHLORIDE, POTASSIUM CHLORIDE, SODIUM LACTATE AND CALCIUM CHLORIDE: 600; 310; 30; 20 INJECTION, SOLUTION INTRAVENOUS at 09:05

## 2023-05-03 NOTE — TRANSFER OF CARE
"Anesthesia Transfer of Care Note    Patient: Anupama Olivo    Procedure(s) Performed: Procedure(s) (LRB):  COLONOSCOPY (N/A)    Patient location: PACU    Anesthesia Type: general    Transport from OR: Transported from OR on room air with adequate spontaneous ventilation    Post pain: adequate analgesia    Post assessment: no apparent anesthetic complications    Post vital signs: stable    Level of consciousness: awake    Nausea/Vomiting: no nausea/vomiting    Complications: none    Transfer of care protocol was followed      Last vitals:   Visit Vitals  /80 (BP Location: Right arm, Patient Position: Sitting)   Pulse 80   Temp 36.2 °C (97.2 °F) (Temporal)   Resp 16   Ht 5' 3.5" (1.613 m)   Wt 100.5 kg (221 lb 9 oz)   SpO2 96%   Breastfeeding No   BMI 38.63 kg/m²     "

## 2023-05-03 NOTE — H&P
"PRE PROCEDURE H&P    Patient Name: Anupama Mays Parent  MRN: [id number]  : 1955  Date of Procedure:  5/3/2023  Referring Physician: Kim Krueger MD  Primary Physician: Kim Krueger MD  Procedure Physician: Aliyah Otoole MD       Planned Procedure: Colonoscopy  Diagnosis: previous adenomatous polyp  Chief Complaint: Same as above    HPI: Patient is an 68 y.o. female is here for the above.     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 benign.    BREAST SURGERY      CATARACT EXTRACTION EXTRACAPSULAR W/ INTRAOCULAR LENS IMPLANTATION Bilateral     CHOLECYSTECTOMY  2014    lap choly for biliary dyskinesia    COLON SURGERY      COLONOSCOPY N/A 2022    Procedure: COLONOSCOPY;  Surgeon: Aliyah Otoole MD;  Location: [facility];  Service: Endoscopy;  Laterality: N/A;    EYE SURGERY      FRACTURE SURGERY      HYSTERECTOMY      OOPHORECTOMY      ORIF WRIST FRACTURE      comminuted intraarticular fx of distal Lt radius w/assoc avulsion fx styloid process of ulna - "metalplate    rectocele   repair          Home Medications:  Prior to Admission medications    Medication Sig Start Date End Date Taking? Authorizing Provider   aspirin (ECOTRIN) 81 MG EC tablet Take 1 tablet by mouth Daily. 12  Yes Historical Provider   levothyroxine (SYNTHROID) 137 MCG Tab tablet TAKE 1 TABLET BY " MOUTH BEFORE BREAKFAST. 2/15/23  Yes Kim Krueger MD   lovastatin (MEVACOR) 40 MG tablet Take 1 tablet (40 mg total) by mouth nightly. 2/15/23  Yes Kim Krueger MD   metFORMIN (GLUCOPHAGE-XR) 500 MG ER 24hr tablet Take 4 tablets by mouth daily 2/15/23  Yes Kim Krueger MD   omeprazole (PRILOSEC) 40 MG capsule Take 1 capsule (40 mg total) by mouth once daily. 2/15/23  Yes Kim Krueger MD   paroxetine (PAXIL) 10 MG tablet Take 1 tablet (10 mg total) by mouth once daily. With the 40mg for total daily dose of 50mg in 24 hrs. 2/15/23  Yes Kim Krueger MD   paroxetine (PAXIL) 40 MG tablet Take 1 tablet (40 mg total) by mouth every evening. With the 10mg for total daily dose of 50mg in 24 hrs. 2/15/23  Yes Kim Krueger MD   sod sulf-pot chloride-mag sulf (SUTAB) 1.479-0.188- 0.225 gram tablet Take 12 tablets by mouth once daily. Take according to package instructions with indicated amount of water. 4/11/23  Yes Herminio Serrano PA-C   traZODone (DESYREL) 50 MG tablet Take 1 tablet (50 mg total) by mouth every evening. 2/15/23  Yes Kim Krueger MD   triamterene-hydrochlorothiazide 75-50 mg (MAXZIDE) 75-50 mg per tablet TAKE 1 TABLET BY MOUTH ONE TIME DAILY 2/15/23  Yes Kim Krueger MD   blood sugar diagnostic (TRUETEST TEST STRIPS) Strp 1 strip by Misc.(Non-Drug; Combo Route) route 4 (four) times daily with meals and nightly. [employer] True Metrix Test Strips 4/25/23   Kim Krueger MD   blood-glucose meter (TRUE METRIX AIR GLUCOSE METER) kit USE AS DIRECTED 4/25/23   Kim Krueger MD   lancets (TRUEPLUS LANCETS) 28 gauge Misc 1 lancet by Misc.(Non-Drug; Combo Route) route 4 (four) times daily with meals and nightly. [employer] brand 4/20/23   Kim Krueger MD        Allergies:  Review of patient's allergies indicates:   Allergen Reactions    Betamethasone sodium phosphate      Sweat and depression    Codeine      Other reaction(s): Shortness of breath    Loratadine      Other reaction(s): Felt  "distant    Prednisone      Other reaction(s): sweat and depression    Tomato (solanum lycopersicum) Hives and Rash        Social History:   Social History     Socioeconomic History    Marital status:     Number of children: 2   Occupational History    Occupation: retired   Tobacco Use    Smoking status: Never    Smokeless tobacco: Never   Substance and Sexual Activity    Alcohol use: Not Currently     Alcohol/week: 0.0 standard drinks     Comment: rarely // once a year    Drug use: Never    Sexual activity: Not Currently     Partners: Male     Birth control/protection: None       Family History:  Family History   Problem Relation Age of Onset    Diabetes Other     Colon cancer Maternal Grandmother     Colon cancer Cousin     Colon cancer Cousin     Breast cancer Neg Hx        ROS: No acute cardiac events, no acute respiratory complaints.     Physical Exam (all patients):    /80 (BP Location: Right arm, Patient Position: Sitting)   Pulse 80   Temp 97.2 °F (36.2 °C) (Temporal)   Resp 16   Ht 5' 3.5" (1.613 m)   Wt 100.5 kg (221 lb 9 oz)   SpO2 96%   Breastfeeding No   BMI 38.63 kg/m²   Lungs: Clear to auscultation bilaterally, respirations unlabored  Heart: Regular rate and rhythm, S1 and S2 normal, no obvious murmurs  Abdomen:         Soft, non-tender, bowel sounds normal, no masses, no organomegaly    Lab Results   Component Value Date    WBC 7.49 02/15/2023    MCV 93 02/15/2023    RDW 14.3 02/15/2023     02/15/2023     02/15/2023    HGBA1C 5.9 (H) 02/15/2023    BUN 14 02/15/2023     02/15/2023    K 4.0 02/15/2023     02/15/2023        SEDATION PLAN: per anesthesia      History reviewed, vital signs satisfactory, cardiopulmonary status satisfactory, sedation options, risks and plans have been discussed with the patient  All their questions were answered and the patient agrees to the sedation procedures as planned and the patient is deemed an appropriate candidate for " the sedation as planned.    Procedure explained to patient, informed consent obtained and placed in chart.    Aliyah Otoole  5/3/2023  10:05 AM

## 2023-05-03 NOTE — PROVATION PATIENT INSTRUCTIONS
Discharge Summary/Instructions after an Endoscopic Procedure  Patient Name: Anupama Olivo  Patient MRN: [id number]  Patient YOB: 1955  Wednesday, May 3, 2023  Aliyah Otoole MD  Dear patient,  As a result of recent federal legislation (The Federal Cures Act), you may   receive lab or pathology results from your procedure in your [facility]   account before your physician is able to contact you. Your physician or   their representative will relay the results to you with their   recommendations at their soonest availability.  Thank you,  RESTRICTIONS:  During your procedure today, you received medications for sedation.  These   medications may affect your judgment, balance and coordination.  Therefore,   for 24 hours, you have the following restrictions:   - DO NOT drive a car, operate machinery, make legal/financial decisions,   sign important papers or drink alcohol.    ACTIVITY:  Today: no heavy lifting, straining or running due to procedural   sedation/anesthesia.  The following day: return to full activity including work.  DIET:  Eat and drink normally unless instructed otherwise.     TREATMENT FOR COMMON SIDE EFFECTS:  - Mild abdominal pain, nausea, belching, bloating or excessive gas:  rest,   eat lightly and use a heating pad.  - Sore Throat: treat with throat lozenges and/or gargle with warm salt   water.  - Because air was used during the procedure, expelling large amounts of air   from your rectum or belching is normal.  - If a bowel prep was taken, you may not have a bowel movement for 1-3 days.    This is normal.  SYMPTOMS TO WATCH FOR AND REPORT TO YOUR PHYSICIAN:  1. Abdominal pain or bloating, other than gas cramps.  2. Chest pain.  3. Back pain.  4. Signs of infection such as: chills or fever occurring within 24 hours   after the procedure.  5. Rectal bleeding, which would show as bright red, maroon, or black stools.   (A tablespoon of blood from the rectum is not serious, especially if    hemorrhoids are present.)  6. Vomiting.  7. Weakness or dizziness.  GO DIRECTLY TO THE NEAREST EMERGENCY ROOM IF YOU HAVE ANY OF THE FOLLOWING:      Difficulty breathing              Chills and/or fever over 101 F   Persistent vomiting and/or vomiting blood   Severe abdominal pain   Severe chest pain   Black, tarry stools   Bleeding- more than one tablespoon   Any other symptom or condition that you feel may need urgent attention  Your doctor recommends these additional instructions:  If any biopsies were taken, your doctors clinic will contact you in 1 to 2   weeks with any results.  - Patient has a contact number available for emergencies.  The signs and   symptoms of potential delayed complications were discussed with the   patient.  Return to normal activities tomorrow.  Written discharge   instructions were provided to the patient.   - Discharge patient to home (via wheelchair).   - Resume previous diet today.   - Continue present medications.   - No aspirin, ibuprofen, naproxen, or other non-steroidal anti-inflammatory   drugs for 7 days after polyp removal.   - Await pathology results.   - Repeat colonoscopy in 1 year because the bowel preparation was suboptimal.     - Needs appointment in GI prior to next colonoscopy to plan out a bowel prep   for the week prior to the procedure.  For questions, problems or results please call your physician Aliyah Otoole MD at Work:  [phone]  If you have any questions about the above instructions, call the GI   department at [phone] or call the endoscopy unit at [phone]   from 7am until 3 pm.  [facility], EMERGENCY ROOM PHONE NUMBER:   [phone]  IF A COMPLICATION OR EMERGENCY SITUATION ARISES AND YOU ARE UNABLE TO REACH   YOUR PHYSICIAN - GO DIRECTLY TO THE EMERGENCY ROOM.  I have read or have had read to me these discharge instructions for my   procedure and have received a written copy.  I understand these    instructions and will follow-up with my physician if I have any questions.     __________________________________       _____________________________________  Nurse Signature                                          Patient/Designated   Responsible Party Signature  MD Aliyah Schaefer MD  5/3/2023 11:06:44 AM  This report has been verified and signed electronically.  Dear patient,  As a result of recent federal legislation (The Federal Cures Act), you may   receive lab or pathology results from your procedure in your [facility]   account before your physician is able to contact you. Your physician or   their representative will relay the results to you with their   recommendations at their soonest availability.  Thank you,  PROVATION

## 2023-05-03 NOTE — ANESTHESIA POSTPROCEDURE EVALUATION
Anesthesia Post Evaluation    Patient: Anupama Olivo    Procedure(s) Performed: Procedure(s) (LRB):  COLONOSCOPY (N/A)    Final Anesthesia Type: general      Patient location during evaluation: GI PACU  Patient participation: Yes- Able to Participate  Level of consciousness: awake  Post-procedure vital signs: reviewed and stable  Pain management: adequate  Airway patency: patent    PONV status at discharge: No PONV  Anesthetic complications: no      Cardiovascular status: stable  Respiratory status: unassisted  Hydration status: euvolemic  Follow-up not needed.          Vitals Value Taken Time   /63 05/03/23 1113   Temp 36.3 °C (97.4 °F) 05/03/23 1107   Pulse 68 05/03/23 1115   Resp 51 05/03/23 1115   SpO2 92 % 05/03/23 1115   Vitals shown include unvalidated device data.      No case tracking events are documented in the log.      Pain/Jose De Jesus Score: Jose De Jesus Score: 10 (5/3/2023 11:12 AM)

## 2023-05-04 ENCOUNTER — PATIENT MESSAGE (OUTPATIENT)
Dept: GASTROENTEROLOGY | Facility: CLINIC | Age: 68
End: 2023-05-04
Payer: MEDICARE

## 2023-05-08 LAB
FINAL PATHOLOGIC DIAGNOSIS: NORMAL
Lab: NORMAL

## 2023-05-12 ENCOUNTER — NURSE TRIAGE (OUTPATIENT)
Dept: ADMINISTRATIVE | Facility: CLINIC | Age: 68
End: 2023-05-12
Payer: MEDICARE

## 2023-05-12 ENCOUNTER — PATIENT MESSAGE (OUTPATIENT)
Dept: GASTROENTEROLOGY | Facility: CLINIC | Age: 68
End: 2023-05-12
Payer: MEDICARE

## 2023-05-13 NOTE — TELEPHONE ENCOUNTER
Patient states she has a bone density test on Monday at 10am. April 3rd she had a colonoscopy and they put a metal clip where she had a polyp. She says she was told not to have any MRIs so she was wondering if it was ok to have the bone density test while she has this clip. Please contact caller directly as soon as possible to advise her.    Reason for Disposition   Question about upcoming scheduled test, no triage required and triager able to answer question    Protocols used: Information Only Call - No Triage-A-

## 2023-06-05 ENCOUNTER — HOSPITAL ENCOUNTER (OUTPATIENT)
Dept: RADIOLOGY | Facility: HOSPITAL | Age: 68
Discharge: HOME | End: 2023-06-05
Attending: INTERNAL MEDICINE
Payer: MEDICARE

## 2023-06-05 DIAGNOSIS — S02.85XD CLOSED FRACTURE OF ORBIT WITH ROUTINE HEALING, SUBSEQUENT ENCOUNTER: ICD-10-CM

## 2023-06-05 DIAGNOSIS — S09.93XS FACIAL TRAUMA, SEQUELA: ICD-10-CM

## 2023-06-05 PROCEDURE — 70150 X-RAY EXAM OF FACIAL BONES: CPT | Mod: TC,PN

## 2023-06-05 PROCEDURE — 70150 X-RAY EXAM OF FACIAL BONES: CPT | Mod: 26,,, | Performed by: RADIOLOGY

## 2023-06-05 PROCEDURE — 70150 XR FACIAL BONES 3 OR MORE VIEW: ICD-10-PCS | Mod: 26,,, | Performed by: RADIOLOGY

## 2023-06-14 ENCOUNTER — APPOINTMENT (OUTPATIENT)
Dept: RADIOLOGY | Facility: HOSPITAL | Age: 68
End: 2023-06-14
Attending: FAMILY MEDICINE
Payer: MEDICARE

## 2023-06-14 DIAGNOSIS — N95.9 UNSPECIFIED MENOPAUSAL AND PERIMENOPAUSAL DISORDER: ICD-10-CM

## 2023-06-14 PROCEDURE — 77080 DEXA BONE DENSITY SPINE HIP: ICD-10-PCS | Mod: 26,,, | Performed by: RADIOLOGY

## 2023-06-14 PROCEDURE — 77080 DXA BONE DENSITY AXIAL: CPT | Mod: TC

## 2023-06-14 PROCEDURE — 77080 DXA BONE DENSITY AXIAL: CPT | Mod: 26,,, | Performed by: RADIOLOGY

## 2023-06-16 ENCOUNTER — PATIENT MESSAGE (OUTPATIENT)
Dept: ADMINISTRATIVE | Facility: OTHER | Age: 68
End: 2023-06-16
Payer: MEDICARE

## 2023-06-16 NOTE — PROGRESS NOTES
Anupama A. Parent    Your bone density report shows:  Osteopenia with a moderate fracture risk.     I would like for you to obtain 1200mg of calcium through your diet with food ( not a supplement)  along with about 1000 IU of vitamin D a day. Have you ever been on fosamax or boniva or actonel to strengthen your bones? If not, the we need to start a medication. If you have, please let me know what you have been on in the past. Usually the medications are dosed 1 tablet per week on empty stomach first thing in AM before any of her other meds, stay upright for 1 hour and then can take other morning meds on the days she takes the fosamax.  Boniva and Actonel are monthly versions of the same thing.     We will repeat the dexa scan in 3 years.     Kim Krueger MD

## 2023-06-17 ENCOUNTER — PATIENT MESSAGE (OUTPATIENT)
Dept: ADMINISTRATIVE | Facility: OTHER | Age: 68
End: 2023-06-17
Payer: MEDICARE

## 2023-10-08 NOTE — PLAN OF CARE
Patient visit for pre op instructions.  Pre operative instruction booklet given  Hibiclens given to patient with showering instructions     Principal Discharge DX:	Syncope   1

## 2023-11-13 ENCOUNTER — PATIENT MESSAGE (OUTPATIENT)
Dept: ADMINISTRATIVE | Facility: HOSPITAL | Age: 68
End: 2023-11-13
Payer: MEDICARE

## 2023-11-13 DIAGNOSIS — E11.9 TYPE 2 DIABETES MELLITUS WITHOUT COMPLICATION, UNSPECIFIED WHETHER LONG TERM INSULIN USE: ICD-10-CM

## 2024-01-22 NOTE — TELEPHONE ENCOUNTER
Care Due:                  Date            Visit Type   Department     Provider  --------------------------------------------------------------------------------                                EP -                              PRIMARY      [facility] PRIMARY  Last Visit: 02-      CARE ([facility])   CARE           Kim Krueger                              EP -                              PRIMARY      [facility] PRIMARY  Next Visit: 02-      CARE ([facility])   [facility]           Kim Krueger                                                            Last  Test          Frequency    Reason                     Performed    Due Date  --------------------------------------------------------------------------------    CMP.........  12 months..  lovastatin, metFORMIN,     02-   02-                             triamterene-hydrochloroth                             iazide...................    HBA1C.......  6 months...  metFORMIN................  02-   08-    Lipid Panel.  12 months..  lovastatin...............  02-   02-    [facility] Embedded Care Due Messages. Reference number: [id number].   1/22/2024 1:22:21 PM CST

## 2024-01-23 RX ORDER — METFORMIN HYDROCHLORIDE 500 MG/1
TABLET, EXTENDED RELEASE ORAL
Qty: 360 TABLET | Refills: 3 | Status: SHIPPED | OUTPATIENT
Start: 2024-01-23 | End: 2024-02-21 | Stop reason: SDUPTHER

## 2024-01-23 NOTE — TELEPHONE ENCOUNTER
Refill Routing Note   Medication(s) are not appropriate for processing by [facility] for the following reason(s):        Required labs outdated    [facility] action(s):  Defer     Requires labs : Yes             Appointments  past 12m or future 3m with PCP    Date Provider   Last Visit   2/15/2023 Kim Krueger MD   Next Visit   2/15/2024 Kim Krueger MD   ED visits in past 90 days: 0        Note composed:3:23 AM 01/23/2024

## 2024-01-26 ENCOUNTER — TELEPHONE (OUTPATIENT)
Dept: PRIMARY CARE CLINIC | Facility: CLINIC | Age: 69
End: 2024-01-26
Payer: MEDICARE

## 2024-02-21 ENCOUNTER — LAB VISIT (OUTPATIENT)
Dept: LAB | Facility: HOSPITAL | Age: 69
End: 2024-02-21
Attending: FAMILY MEDICINE
Payer: MEDICARE

## 2024-02-21 ENCOUNTER — OFFICE VISIT (OUTPATIENT)
Dept: PRIMARY CARE CLINIC | Facility: CLINIC | Age: 69
End: 2024-02-21
Payer: MEDICARE

## 2024-02-21 ENCOUNTER — PATIENT MESSAGE (OUTPATIENT)
Dept: PRIMARY CARE CLINIC | Facility: CLINIC | Age: 69
End: 2024-02-21

## 2024-02-21 VITALS
TEMPERATURE: 98 F | WEIGHT: 222.13 LBS | HEIGHT: 64 IN | HEART RATE: 72 BPM | DIASTOLIC BLOOD PRESSURE: 98 MMHG | BODY MASS INDEX: 37.92 KG/M2 | OXYGEN SATURATION: 98 % | SYSTOLIC BLOOD PRESSURE: 150 MMHG | RESPIRATION RATE: 19 BRPM

## 2024-02-21 DIAGNOSIS — E03.4 HYPOTHYROIDISM DUE TO ACQUIRED ATROPHY OF THYROID: ICD-10-CM

## 2024-02-21 DIAGNOSIS — E78.5 DYSLIPIDEMIA: ICD-10-CM

## 2024-02-21 DIAGNOSIS — Z12.11 COLON CANCER SCREENING: ICD-10-CM

## 2024-02-21 DIAGNOSIS — I10 DIABETES MELLITUS WITH COINCIDENT HYPERTENSION: ICD-10-CM

## 2024-02-21 DIAGNOSIS — E11.69 MIXED DIABETIC HYPERLIPIDEMIA ASSOCIATED WITH TYPE 2 DIABETES MELLITUS: ICD-10-CM

## 2024-02-21 DIAGNOSIS — K21.9 GASTROESOPHAGEAL REFLUX DISEASE, UNSPECIFIED WHETHER ESOPHAGITIS PRESENT: ICD-10-CM

## 2024-02-21 DIAGNOSIS — E11.9 CONTROLLED TYPE 2 DIABETES MELLITUS WITHOUT COMPLICATION, WITHOUT LONG-TERM CURRENT USE OF INSULIN: ICD-10-CM

## 2024-02-21 DIAGNOSIS — K63.5 POLYP OF COLON, UNSPECIFIED PART OF COLON, UNSPECIFIED TYPE: ICD-10-CM

## 2024-02-21 DIAGNOSIS — F29 UNSPECIFIED PSYCHOSIS NOT DUE TO A SUBSTANCE OR KNOWN PHYSIOLOGICAL CONDITION: ICD-10-CM

## 2024-02-21 DIAGNOSIS — E78.2 MIXED DIABETIC HYPERLIPIDEMIA ASSOCIATED WITH TYPE 2 DIABETES MELLITUS: ICD-10-CM

## 2024-02-21 DIAGNOSIS — Z00.00 ROUTINE GENERAL MEDICAL EXAMINATION AT A HEALTH CARE FACILITY: Primary | ICD-10-CM

## 2024-02-21 DIAGNOSIS — I10 ESSENTIAL HYPERTENSION: ICD-10-CM

## 2024-02-21 DIAGNOSIS — Z12.31 ENCOUNTER FOR SCREENING MAMMOGRAM FOR BREAST CANCER: ICD-10-CM

## 2024-02-21 DIAGNOSIS — N18.31 CHRONIC KIDNEY DISEASE, STAGE 3A: ICD-10-CM

## 2024-02-21 DIAGNOSIS — E11.9 DIABETES MELLITUS WITH COINCIDENT HYPERTENSION: ICD-10-CM

## 2024-02-21 DIAGNOSIS — E66.01 SEVERE OBESITY (BMI 35.0-39.9) WITH COMORBIDITY: ICD-10-CM

## 2024-02-21 DIAGNOSIS — F33.42 RECURRENT MAJOR DEPRESSION IN FULL REMISSION: Chronic | ICD-10-CM

## 2024-02-21 DIAGNOSIS — Z00.00 ROUTINE GENERAL MEDICAL EXAMINATION AT A HEALTH CARE FACILITY: ICD-10-CM

## 2024-02-21 LAB
ALBUMIN SERPL BCP-MCNC: 3.8 G/DL (ref 3.5–5.2)
ALBUMIN/CREAT UR: 7.9 UG/MG (ref 0–30)
ALP SERPL-CCNC: 101 U/L (ref 55–135)
ALT SERPL W/O P-5'-P-CCNC: 22 U/L (ref 10–44)
ANION GAP SERPL CALC-SCNC: 14 MMOL/L (ref 8–16)
AST SERPL-CCNC: 28 U/L (ref 10–40)
BASOPHILS # BLD AUTO: 0.07 K/UL (ref 0–0.2)
BASOPHILS NFR BLD: 0.8 % (ref 0–1.9)
BILIRUB SERPL-MCNC: 0.5 MG/DL (ref 0.1–1)
BUN SERPL-MCNC: 10 MG/DL (ref 8–23)
CALCIUM SERPL-MCNC: 10.3 MG/DL (ref 8.7–10.5)
CHLORIDE SERPL-SCNC: 101 MMOL/L (ref 95–110)
CHOLEST SERPL-MCNC: 162 MG/DL (ref 120–199)
CHOLEST/HDLC SERPL: 3.2 {RATIO} (ref 2–5)
CO2 SERPL-SCNC: 24 MMOL/L (ref 23–29)
CREAT SERPL-MCNC: 0.9 MG/DL (ref 0.5–1.4)
CREAT UR-MCNC: 178 MG/DL (ref 15–325)
DIFFERENTIAL METHOD BLD: ABNORMAL
EOSINOPHIL # BLD AUTO: 0.3 K/UL (ref 0–0.5)
EOSINOPHIL NFR BLD: 3.8 % (ref 0–8)
ERYTHROCYTE [DISTWIDTH] IN BLOOD BY AUTOMATED COUNT: 14.4 % (ref 11.5–14.5)
EST. GFR  (NO RACE VARIABLE): >60 ML/MIN/1.73 M^2
ESTIMATED AVG GLUCOSE: 123 MG/DL (ref 68–131)
GLUCOSE SERPL-MCNC: 94 MG/DL (ref 70–110)
HBA1C MFR BLD: 5.9 % (ref 4–5.6)
HCT VFR BLD AUTO: 43.9 % (ref 37–48.5)
HDLC SERPL-MCNC: 51 MG/DL (ref 40–75)
HDLC SERPL: 31.5 % (ref 20–50)
HGB BLD-MCNC: 13.8 G/DL (ref 12–16)
IMM GRANULOCYTES # BLD AUTO: 0.03 K/UL (ref 0–0.04)
IMM GRANULOCYTES NFR BLD AUTO: 0.4 % (ref 0–0.5)
LDLC SERPL CALC-MCNC: 89.2 MG/DL (ref 63–159)
LYMPHOCYTES # BLD AUTO: 2.6 K/UL (ref 1–4.8)
LYMPHOCYTES NFR BLD: 30.8 % (ref 18–48)
MCH RBC QN AUTO: 28.9 PG (ref 27–31)
MCHC RBC AUTO-ENTMCNC: 31.4 G/DL (ref 32–36)
MCV RBC AUTO: 92 FL (ref 82–98)
MICROALBUMIN UR DL<=1MG/L-MCNC: 14 UG/ML
MONOCYTES # BLD AUTO: 0.6 K/UL (ref 0.3–1)
MONOCYTES NFR BLD: 6.5 % (ref 4–15)
NEUTROPHILS # BLD AUTO: 4.9 K/UL (ref 1.8–7.7)
NEUTROPHILS NFR BLD: 57.7 % (ref 38–73)
NONHDLC SERPL-MCNC: 111 MG/DL
NRBC BLD-RTO: 0 /100 WBC
PLATELET # BLD AUTO: 224 K/UL (ref 150–450)
PMV BLD AUTO: 12.4 FL (ref 9.2–12.9)
POTASSIUM SERPL-SCNC: 4.2 MMOL/L (ref 3.5–5.1)
PROT SERPL-MCNC: 7.7 G/DL (ref 6–8.4)
RBC # BLD AUTO: 4.77 M/UL (ref 4–5.4)
SODIUM SERPL-SCNC: 139 MMOL/L (ref 136–145)
T4 FREE SERPL-MCNC: 1.17 NG/DL (ref 0.71–1.51)
TRIGL SERPL-MCNC: 109 MG/DL (ref 30–150)
TSH SERPL DL<=0.005 MIU/L-ACNC: 1.35 UIU/ML (ref 0.4–4)
WBC # BLD AUTO: 8.51 K/UL (ref 3.9–12.7)

## 2024-02-21 PROCEDURE — 83036 HEMOGLOBIN GLYCOSYLATED A1C: CPT | Performed by: FAMILY MEDICINE

## 2024-02-21 PROCEDURE — 85025 COMPLETE CBC W/AUTO DIFF WBC: CPT | Performed by: FAMILY MEDICINE

## 2024-02-21 PROCEDURE — 82043 UR ALBUMIN QUANTITATIVE: CPT | Performed by: FAMILY MEDICINE

## 2024-02-21 PROCEDURE — 84439 ASSAY OF FREE THYROXINE: CPT | Performed by: FAMILY MEDICINE

## 2024-02-21 PROCEDURE — 36415 COLL VENOUS BLD VENIPUNCTURE: CPT | Mod: PN | Performed by: FAMILY MEDICINE

## 2024-02-21 PROCEDURE — 84443 ASSAY THYROID STIM HORMONE: CPT | Performed by: FAMILY MEDICINE

## 2024-02-21 PROCEDURE — 80061 LIPID PANEL: CPT | Performed by: FAMILY MEDICINE

## 2024-02-21 PROCEDURE — 99397 PER PM REEVAL EST PAT 65+ YR: CPT | Mod: S$GLB,,, | Performed by: FAMILY MEDICINE

## 2024-02-21 PROCEDURE — 99999 PR PBB SHADOW E&M-EST. PATIENT-LVL IV: CPT | Mod: PBBFAC,,, | Performed by: FAMILY MEDICINE

## 2024-02-21 PROCEDURE — 80053 COMPREHEN METABOLIC PANEL: CPT | Performed by: FAMILY MEDICINE

## 2024-02-21 RX ORDER — LOVASTATIN 40 MG/1
40 TABLET ORAL NIGHTLY
Qty: 90 TABLET | Refills: 3 | Status: SHIPPED | OUTPATIENT
Start: 2024-02-21

## 2024-02-21 RX ORDER — PAROXETINE HYDROCHLORIDE 40 MG/1
40 TABLET, FILM COATED ORAL NIGHTLY
Qty: 90 TABLET | Refills: 3 | Status: SHIPPED | OUTPATIENT
Start: 2024-02-21

## 2024-02-21 RX ORDER — TRAZODONE HYDROCHLORIDE 50 MG/1
50 TABLET ORAL NIGHTLY
Qty: 90 TABLET | Refills: 3 | Status: SHIPPED | OUTPATIENT
Start: 2024-02-21

## 2024-02-21 RX ORDER — TRIAMTERENE AND HYDROCHLOROTHIAZIDE 75; 50 MG/1; MG/1
TABLET ORAL
Qty: 90 TABLET | Refills: 3 | Status: SHIPPED | OUTPATIENT
Start: 2024-02-21

## 2024-02-21 RX ORDER — BISACODYL 5 MG
10 TABLET, DELAYED RELEASE (ENTERIC COATED) ORAL DAILY PRN
Qty: 180 TABLET | Refills: 1 | Status: SHIPPED | OUTPATIENT
Start: 2024-02-21

## 2024-02-21 RX ORDER — PAROXETINE 10 MG/1
10 TABLET, FILM COATED ORAL DAILY
Qty: 90 TABLET | Refills: 3 | Status: SHIPPED | OUTPATIENT
Start: 2024-02-21

## 2024-02-21 RX ORDER — OMEPRAZOLE 40 MG/1
40 CAPSULE, DELAYED RELEASE ORAL DAILY
Qty: 90 CAPSULE | Refills: 3 | Status: SHIPPED | OUTPATIENT
Start: 2024-02-21

## 2024-02-21 RX ORDER — METFORMIN HYDROCHLORIDE 500 MG/1
TABLET, EXTENDED RELEASE ORAL
Qty: 360 TABLET | Refills: 3 | Status: SHIPPED | OUTPATIENT
Start: 2024-02-21

## 2024-02-21 RX ORDER — LEVOTHYROXINE SODIUM 137 UG/1
TABLET ORAL
Qty: 90 TABLET | Refills: 3 | Status: SHIPPED | OUTPATIENT
Start: 2024-02-21

## 2024-02-21 NOTE — PROGRESS NOTES
Subjective:      Patient ID: Anupama Mays Parent is a 68 y.o. female.    Chief Complaint: Annual Exam      Patient is a 68 y.o. female coming in today for annual exam.  Initial BP is elevated today. Reports not taking HTN medication this morning. She is due for colonoscopy and mammogram. She is requiring medication refill today. Repeat BP is still elevated; states she has cuff at home. Reports normal bowel movements. Medication list updated during visit. No other health concern at this time.       1. Routine general medical examination at a health care facility    2. Dyslipidemia    3. Gastroesophageal reflux disease, unspecified whether esophagitis present    4. Hypothyroidism due to acquired atrophy of thyroid    5. Mixed diabetic hyperlipidemia associated with type 2 diabetes mellitus    6. Essential hypertension    7. Diabetes mellitus with coincident hypertension    8. Encounter for screening mammogram for breast cancer    9. Colon cancer screening    10. Controlled type 2 diabetes mellitus without complication, without long-term current use of insulin    11. Polyp of colon, unspecified part of colon, unspecified type    12. Recurrent major depression in full remission    13. Unspecified psychosis not due to a substance or known physiological condition    14. Chronic kidney disease, stage 3a    15. Severe obesity (BMI 35.0-39.9) with comorbidity       [facility]    2/19/2024 11:50 AM CST - Filed by Patient   This questionnaire should take approximately 5 to 10 minutes to complete.  To begin, press Let's Begin and then press Continue. Let's Begin   On average, how many days per week do you engage in moderate to strenuous exercise (like a brisk walk)?    On average, how many minutes do you engage in exercise at this level?    Do you feel stress - tense, restless, nervous, or anxious, or unable to sleep at night because your mind is troubled all the time - these days? To some extent   Do you belong to any clubs or  organizations such as Gnosticist groups, unions, fraternal or athletic groups, or school groups?    How often do you attend meetings of the clubs or organizations you belong to?    In a typical week, how many times do you talk on the phone with family, friends, or neighbors? More than three times a week   How often do you get together with friends or relatives?    Are you , , , , never , or living with a partner?    How hard is it for you to pay for the very basics like food, housing, medical care, and heating?    Within the past 12 months, you worried that your food would run out before you got the money to buy more. Never true   Within the past 12 months, the food you bought just didnt last and you didnt have money to get more. Never true   In the past 12 months, has lack of transportation kept you from medical appointments or from getting medications? No   In the past 12 months, has lack of transportation kept you from meetings, work, or from getting things needed for daily living? No   How often do you have a drink containing alcohol? Never   How many drinks containing alcohol do you have on a typical day when you are drinking? Patient does not drink   How often do you have six or more drinks on one occasion? Never   In the last 12 months, was there a time when you were not able to pay the mortgage or rent on time? No   In the last 12 months, how many places have you lived? (range: at least 0)    In the last 12 months, was there a time when you did not have a steady place to sleep or slept in a shelter (including now)? No         Pmh, Psh, Family Hx, Social Hx, HM updated in Epic Tabs today.   Review of Systems   Constitutional:  Negative for chills, fatigue and fever.   HENT:  Negative for ear pain and trouble swallowing.    Eyes:  Negative for pain and visual disturbance.   Respiratory:  Negative for cough and shortness of breath.    Cardiovascular:  Negative for  "chest pain and leg swelling.   Gastrointestinal:  Negative for abdominal pain, blood in stool, nausea and vomiting.   Endocrine: Negative for cold intolerance and heat intolerance.   Genitourinary:  Negative for dysuria and frequency.   Musculoskeletal:  Negative for joint swelling, myalgias and neck pain.   Skin:  Negative for color change and rash.   Neurological:  Negative for dizziness and headaches.   Psychiatric/Behavioral:  Negative for behavioral problems and sleep disturbance.      Objective:     Vitals:    02/21/24 0906   BP: (!) 150/98   BP Location: Right arm   Patient Position: Sitting   BP Method: Small (Manual)   Pulse: 72   Resp: 19   Temp: 97.8 °F (36.6 °C)   TempSrc: Tympanic   SpO2: 98%   Weight: 100.8 kg (222 lb 1.8 oz)   Height: 5' 3.5" (1.613 m)     Wt Readings from Last 10 Encounters:   02/21/24 100.8 kg (222 lb 1.8 oz)   05/03/23 100.5 kg (221 lb 9 oz)   05/02/23 102 kg (224 lb 13.9 oz)   02/15/23 100.8 kg (222 lb 1.8 oz)   10/13/22 98.9 kg (218 lb)   08/15/22 99.7 kg (219 lb 12.8 oz)   02/14/22 96.1 kg (211 lb 12 oz)   01/18/22 96.2 kg (211 lb 15.6 oz)   11/18/21 98.4 kg (216 lb 14.9 oz)   08/24/21 98.9 kg (218 lb)     Physical Exam  Vitals reviewed.   Constitutional:       Appearance: Normal appearance. She is well-developed. She is morbidly obese.   HENT:      Head: Normocephalic and atraumatic.      Right Ear: Tympanic membrane and external ear normal.      Left Ear: Tympanic membrane and external ear normal.      Nose: Nose normal.      Mouth/Throat:      Mouth: Mucous membranes are moist.      Pharynx: Oropharynx is clear.   Eyes:      Conjunctiva/sclera: Conjunctivae normal.      Pupils: Pupils are equal, round, and reactive to light.   Neck:      Thyroid: No thyromegaly.   Cardiovascular:      Rate and Rhythm: Normal rate and regular rhythm.      Pulses:           Dorsalis pedis pulses are 2+ on the right side and 2+ on the left side.      Heart sounds: Normal heart sounds. No murmur " heard.     No friction rub. No gallop.   Pulmonary:      Effort: Pulmonary effort is normal. No respiratory distress.      Breath sounds: Normal breath sounds. No wheezing or rales.   Abdominal:      General: Bowel sounds are normal. There is no distension.      Palpations: Abdomen is soft.      Tenderness: There is no abdominal tenderness. There is no rebound.   Musculoskeletal:         General: Normal range of motion.      Cervical back: Normal range of motion and neck supple.      Right foot: Normal range of motion. No deformity.      Left foot: Normal range of motion. No deformity.   Feet:      Right foot:      Protective Sensation: 4 sites tested.  4 sites sensed.      Skin integrity: Warmth present. No ulcer, blister, skin breakdown, erythema, callus or dry skin.      Left foot:      Protective Sensation: 4 sites tested.  4 sites sensed.      Skin integrity: Warmth present. No ulcer, blister, skin breakdown, erythema, callus or dry skin.   Lymphadenopathy:      Cervical: No cervical adenopathy.   Skin:     General: Skin is warm and dry.      Findings: No rash.   Neurological:      Mental Status: She is alert and oriented to person, place, and time.   Psychiatric:         Attention and Perception: Attention and perception normal.         Mood and Affect: Mood and affect normal.         Speech: Speech normal.         Behavior: Behavior normal.         Thought Content: Thought content normal.         Cognition and Memory: Cognition and memory normal.         Judgment: Judgment normal.         Assessment:     1. Routine general medical examination at a health care facility    2. Dyslipidemia    3. Gastroesophageal reflux disease, unspecified whether esophagitis present    4. Hypothyroidism due to acquired atrophy of thyroid    5. Mixed diabetic hyperlipidemia associated with type 2 diabetes mellitus    6. Essential hypertension    7. Diabetes mellitus with coincident hypertension    8. Encounter for screening  mammogram for breast cancer    9. Colon cancer screening    10. Controlled type 2 diabetes mellitus without complication, without long-term current use of insulin    11. Polyp of colon, unspecified part of colon, unspecified type    12. Recurrent major depression in full remission    13. Unspecified psychosis not due to a substance or known physiological condition    14. Chronic kidney disease, stage 3a    15. Severe obesity (BMI 35.0-39.9) with comorbidity        Plan:   Anupama was seen today for annual exam.    Diagnoses and all orders for this visit:    Routine general medical examination at a health care facility  -     TSH; Future  -     T4, Free; Future  -     Lipid Panel; Future  -     Hemoglobin A1C; Future  -     Comprehensive Metabolic Panel; Future  -     CBC Auto Differential; Future  -     Microalbumin/Creatinine Ratio, Urine; Future    Dyslipidemia  -     TSH; Future  -     T4, Free; Future  -     Lipid Panel; Future  -     Hemoglobin A1C; Future  -     Comprehensive Metabolic Panel; Future  -     CBC Auto Differential; Future  -     Microalbumin/Creatinine Ratio, Urine; Future  -     lovastatin (MEVACOR) 40 MG tablet; Take 1 tablet (40 mg total) by mouth nightly.    Gastroesophageal reflux disease, unspecified whether esophagitis present  -     TSH; Future  -     T4, Free; Future  -     Lipid Panel; Future  -     Hemoglobin A1C; Future  -     Comprehensive Metabolic Panel; Future  -     CBC Auto Differential; Future  -     Microalbumin/Creatinine Ratio, Urine; Future  -     omeprazole (PRILOSEC) 40 MG capsule; Take 1 capsule (40 mg total) by mouth once daily.    Hypothyroidism due to acquired atrophy of thyroid  -     TSH; Future  -     T4, Free; Future  -     Lipid Panel; Future  -     Hemoglobin A1C; Future  -     Comprehensive Metabolic Panel; Future  -     CBC Auto Differential; Future  -     Microalbumin/Creatinine Ratio, Urine; Future  -     levothyroxine (SYNTHROID) 137 MCG Tab tablet; TAKE 1  TABLET BY MOUTH BEFORE BREAKFAST.    Mixed diabetic hyperlipidemia associated with type 2 diabetes mellitus  -     TSH; Future  -     T4, Free; Future  -     Lipid Panel; Future  -     Hemoglobin A1C; Future  -     Comprehensive Metabolic Panel; Future  -     CBC Auto Differential; Future  -     Microalbumin/Creatinine Ratio, Urine; Future    Essential hypertension  -     TSH; Future  -     T4, Free; Future  -     Lipid Panel; Future  -     Hemoglobin A1C; Future  -     Comprehensive Metabolic Panel; Future  -     CBC Auto Differential; Future  -     Microalbumin/Creatinine Ratio, Urine; Future  -     triamterene-hydrochlorothiazide 75-50 mg (MAXZIDE) 75-50 mg per tablet; TAKE 1 TABLET BY MOUTH ONE TIME DAILY    Diabetes mellitus with coincident hypertension  -     TSH; Future  -     T4, Free; Future  -     Lipid Panel; Future  -     Hemoglobin A1C; Future  -     Comprehensive Metabolic Panel; Future  -     CBC Auto Differential; Future  -     Microalbumin/Creatinine Ratio, Urine; Future  -     metFORMIN (GLUCOPHAGE-XR) 500 MG ER 24hr tablet; TAKE 4 TABLETS BY MOUTH ONCE DAILY FOR DIABETES    Encounter for screening mammogram for breast cancer  -     Mammo Digital Screening Bilat w/ Kiko; Future    Colon cancer screening  -     Ambulatory referral/consult to Endo Procedure ; Future    Controlled type 2 diabetes mellitus without complication, without long-term current use of insulin  -     metFORMIN (GLUCOPHAGE-XR) 500 MG ER 24hr tablet; TAKE 4 TABLETS BY MOUTH ONCE DAILY FOR DIABETES    Polyp of colon, unspecified part of colon, unspecified type  -     Ambulatory referral/consult to Endo Procedure ; Future    Recurrent major depression in full remission  -     paroxetine (PAXIL) 10 MG tablet; Take 1 tablet (10 mg total) by mouth once daily. With the 40mg for total daily dose of 50mg in 24 hrs.  -     paroxetine (PAXIL) 40 MG tablet; Take 1 tablet (40 mg total) by mouth every evening. With the 10mg  for total daily dose of 50mg in 24 hrs.  -     traZODone (DESYREL) 50 MG tablet; Take 1 tablet (50 mg total) by mouth every evening.    Unspecified psychosis not due to a substance or known physiological condition    Chronic kidney disease, stage 3a    Severe obesity (BMI 35.0-39.9) with comorbidity    Other orders  -     bisacodyL (DULCOLAX, BISACODYL,) 5 mg EC tablet; Take 2 tablets (10 mg total) by mouth daily as needed for Constipation.      HTN is not properly controlled at this time. Pt did not take water pill this morning.   Instructed to take HTN medication as prescribed and to monitor BP regularly; pt to send BP readings to update in chart.   Other chronic diagnoses were reassessed during today's visit. The associated diagnoses are linked to their chronic conditions. These conditions are currently stable, and will be monitored through associated labs, imaging, and treated with the associated medications as listed per EPIC in the patient's Medication List. I will refill medications to continue ongoing care as requested per the patient or pharmacy when needed.   Medications refilled as stated above.   Referral given to schedule colonoscopy.   Mammogram ordered to be completed this month.   Lab work ordered to be completed today.   Instructed to f/u in 1 year.     There are no Patient Instructions on file for this visit.    Follow up in about 1 year (around 2/21/2025) for physical with Dr NUNES.      LABS:   Lab Results   Component Value Date    HGBA1C 5.9 (H) 02/15/2023    HGBA1C 5.6 08/15/2022    HGBA1C 5.6 02/14/2022      Lab Results   Component Value Date    CHOL 169 02/15/2023    CHOL 164 02/14/2022    CHOL 194 07/27/2021     Lab Results   Component Value Date    LDLCALC 90.2 02/15/2023    LDLCALC 91.4 02/14/2022    LDLCALC 111.0 07/27/2021     Lab Results   Component Value Date    WBC 7.49 02/15/2023    HGB 13.2 02/15/2023    HCT 40.8 02/15/2023     02/15/2023    CHOL 169 02/15/2023    TRIG 114  02/15/2023    HDL 56 02/15/2023    ALT 27 02/15/2023    AST 33 02/15/2023     02/15/2023    K 4.0 02/15/2023     02/15/2023    CREATININE 0.9 02/15/2023    BUN 14 02/15/2023    CO2 25 02/15/2023    TSH 1.385 02/15/2023    HGBA1C 5.9 (H) 02/15/2023       The 10-year ASCVD risk score (Kevin LUIS, et al., 2019) is: 19%    Values used to calculate the score:      Age: 68 years      Sex: Female      Is Non- : No      Diabetic: Yes      Tobacco smoker: No      Systolic Blood Pressure: 150 mmHg      Is BP treated: Yes      HDL Cholesterol: 56 mg/dL      Total Cholesterol: 169 mg/dL  DXA Bone Density Spine And Hip  Narrative: EXAMINATION:  DXA BONE DENSITY AXIAL SKELETON 1 OR MORE SITES    CLINICAL HISTORY:  Unspecified menopausal and perimenopausal disorder    TECHNIQUE:  DXA scanning was performed over the left hip and lumbar spine.  Review of the images confirms satisfactory positioning and technique.    COMPARISON:  None    FINDINGS:  The L1-L2 vertebral bone mineral density is equal to 0.997 g/cm squared with a T score of -1.5.    The left femoral neck bone mineral density is equal to 0.755 g/cm squared with a T score of -2.0.    There is a 16.4% risk of a major osteoporotic fracture and a 2.5% risk of hip fracture in the next 10 years (FRAX).  Impression: Osteopenia    Consider FDA approved medical therapies in postmenopausal women and men aged 50 years and older, based on the following:    *A hip or vertebral (clinical or morphometric) fracture  *T score less than or equal to -2.5 at the femoral neck or spine after appropriate evaluation to exclude secondary causes.  *Low bone mass -- also known as osteopenia (T score between -1.0 and -2.5 at the femoral neck or spine) and a 10 year probability of hip fracture greater than or equal to 3% or a 10 year probability of major osteoporosis-related fracture greater than or equal to 20% based on the [place]-adapted WHO algorithm.  *Clinicians  judgment and/or patient preference may indicate treatment for people with 10 year fracture probabilities is above or below these levels.    Electronically signed by: Adriel Sy MD  Date:    06/14/2023  Time:    13:43    Scribe Attestation:   I, Tao Blanco, am scribing for, and in the presence of, Dr. Kim Krueger MD. I performed the above scribed service and the documentation accurately describes the services I performed. I attest to the accuracy of the note.    I, Dr. Kim Krueger MD, reviewed documentation as scribed above. I personally performed the services described in this documentation.  I agree that the record reflects my personal performance and is accurate and complete. Kim Krueger MD.    02/21/2024

## 2024-02-23 ENCOUNTER — HOSPITAL ENCOUNTER (OUTPATIENT)
Dept: PREADMISSION TESTING | Facility: HOSPITAL | Age: 69
Discharge: HOME | End: 2024-02-23
Attending: FAMILY MEDICINE
Payer: MEDICARE

## 2024-02-23 DIAGNOSIS — Z12.11 COLON CANCER SCREENING: ICD-10-CM

## 2024-02-23 DIAGNOSIS — K63.5 POLYP OF COLON, UNSPECIFIED PART OF COLON, UNSPECIFIED TYPE: ICD-10-CM

## 2024-02-24 ENCOUNTER — PATIENT MESSAGE (OUTPATIENT)
Dept: PRIMARY CARE CLINIC | Facility: CLINIC | Age: 69
End: 2024-02-24
Payer: MEDICARE

## 2024-02-26 ENCOUNTER — HOSPITAL ENCOUNTER (OUTPATIENT)
Dept: PREADMISSION TESTING | Facility: HOSPITAL | Age: 69
Discharge: HOME | End: 2024-02-26
Attending: INTERNAL MEDICINE
Payer: MEDICARE

## 2024-02-26 ENCOUNTER — PATIENT MESSAGE (OUTPATIENT)
Dept: PREADMISSION TESTING | Facility: HOSPITAL | Age: 69
End: 2024-02-26

## 2024-02-26 ENCOUNTER — TELEPHONE (OUTPATIENT)
Dept: PREADMISSION TESTING | Facility: HOSPITAL | Age: 69
End: 2024-02-26
Payer: MEDICARE

## 2024-02-26 VITALS — DIASTOLIC BLOOD PRESSURE: 74 MMHG | SYSTOLIC BLOOD PRESSURE: 147 MMHG

## 2024-02-26 DIAGNOSIS — Z12.11 COLON CANCER SCREENING: Primary | ICD-10-CM

## 2024-02-26 DIAGNOSIS — K63.5 POLYP OF COLON, UNSPECIFIED PART OF COLON, UNSPECIFIED TYPE: ICD-10-CM

## 2024-02-26 RX ORDER — SODIUM, POTASSIUM,MAG SULFATES 17.5-3.13G
1 SOLUTION, RECONSTITUTED, ORAL ORAL DAILY
Qty: 1 KIT | Refills: 0 | Status: SHIPPED | OUTPATIENT
Start: 2024-02-26 | End: 2024-02-28

## 2024-02-26 RX ORDER — POLYETHYLENE GLYCOL 3350, SODIUM SULFATE ANHYDROUS, SODIUM BICARBONATE, SODIUM CHLORIDE, POTASSIUM CHLORIDE 236; 22.74; 6.74; 5.86; 2.97 G/4L; G/4L; G/4L; G/4L; G/4L
4 POWDER, FOR SOLUTION ORAL ONCE
Qty: 4000 ML | Refills: 0 | Status: CANCELLED | OUTPATIENT
Start: 2024-02-26 | End: 2024-02-26

## 2024-02-26 NOTE — TELEPHONE ENCOUNTER
Patient schedule colonoscopy at PAT appointment. Per previous report, patient needs to be seen in GI clinic to discuss prep for procedure. Patient declined GI office visit. Stated her PCP gave instructions as to how to prep for procedure. Patient will also follow three day procedure prep.

## 2024-02-26 NOTE — TELEPHONE ENCOUNTER
----- Message from Christophe Miller MD sent at 8/8/2023  5:08 PM EDT -----  Hi,    Can you please call the patient and let him know that overall the biopsies confirmed the ulcer but nothing else of concern. He should follow-up with us as an outpatient within the next 2 to 4 weeks and he also needs a repeat EGD in 3 months    Thank you! Call patient and let's see what BP is today. If still with top # > 140 or bottom number > 90 then need to add in additional blood pressure medication as it isn't controlled.

## 2024-02-27 ENCOUNTER — PATIENT MESSAGE (OUTPATIENT)
Dept: PRIMARY CARE CLINIC | Facility: CLINIC | Age: 69
End: 2024-02-27
Payer: MEDICARE

## 2024-02-27 ENCOUNTER — TELEPHONE (OUTPATIENT)
Dept: PRIMARY CARE CLINIC | Facility: CLINIC | Age: 69
End: 2024-02-27
Payer: MEDICARE

## 2024-02-27 VITALS — SYSTOLIC BLOOD PRESSURE: 138 MMHG | DIASTOLIC BLOOD PRESSURE: 85 MMHG

## 2024-02-27 VITALS
DIASTOLIC BLOOD PRESSURE: 72 MMHG | SYSTOLIC BLOOD PRESSURE: 127 MMHG | SYSTOLIC BLOOD PRESSURE: 127 MMHG | DIASTOLIC BLOOD PRESSURE: 72 MMHG

## 2024-02-28 VITALS — SYSTOLIC BLOOD PRESSURE: 138 MMHG | DIASTOLIC BLOOD PRESSURE: 79 MMHG

## 2024-03-04 ENCOUNTER — PATIENT MESSAGE (OUTPATIENT)
Dept: PRIMARY CARE CLINIC | Facility: CLINIC | Age: 69
End: 2024-03-04
Payer: MEDICARE

## 2024-05-01 ENCOUNTER — ANESTHESIA EVENT (OUTPATIENT)
Dept: ENDOSCOPY | Facility: HOSPITAL | Age: 69
End: 2024-05-01
Payer: MEDICARE

## 2024-05-01 ENCOUNTER — ANESTHESIA (OUTPATIENT)
Dept: ENDOSCOPY | Facility: HOSPITAL | Age: 69
End: 2024-05-01
Payer: MEDICARE

## 2024-05-01 ENCOUNTER — HOSPITAL ENCOUNTER (OUTPATIENT)
Facility: HOSPITAL | Age: 69
Discharge: HOME | End: 2024-05-01
Attending: INTERNAL MEDICINE | Admitting: INTERNAL MEDICINE
Payer: MEDICARE

## 2024-05-01 DIAGNOSIS — K63.5 COLON POLYP: ICD-10-CM

## 2024-05-01 LAB — POCT GLUCOSE: 114 MG/DL (ref 70–110)

## 2024-05-01 PROCEDURE — 37000008 HC ANESTHESIA 1ST 15 MINUTES: Performed by: INTERNAL MEDICINE

## 2024-05-01 PROCEDURE — 25000003 PHARM REV CODE 250: Performed by: INTERNAL MEDICINE

## 2024-05-01 PROCEDURE — G0105 COLORECTAL SCRN; HI RISK IND: HCPCS | Performed by: INTERNAL MEDICINE

## 2024-05-01 PROCEDURE — 37000009 HC ANESTHESIA EA ADD 15 MINS: Performed by: INTERNAL MEDICINE

## 2024-05-01 PROCEDURE — 63600175 PHARM REV CODE 636 W HCPCS

## 2024-05-01 PROCEDURE — G0105 COLORECTAL SCRN; HI RISK IND: HCPCS | Mod: ,,, | Performed by: INTERNAL MEDICINE

## 2024-05-01 RX ORDER — SODIUM CHLORIDE, SODIUM LACTATE, POTASSIUM CHLORIDE, CALCIUM CHLORIDE 600; 310; 30; 20 MG/100ML; MG/100ML; MG/100ML; MG/100ML
INJECTION, SOLUTION INTRAVENOUS CONTINUOUS PRN
Status: DISCONTINUED | OUTPATIENT
Start: 2024-05-01 | End: 2024-05-01

## 2024-05-01 RX ORDER — PROPOFOL 10 MG/ML
VIAL (ML) INTRAVENOUS
Status: DISCONTINUED | OUTPATIENT
Start: 2024-05-01 | End: 2024-05-01

## 2024-05-01 RX ORDER — DEXTROMETHORPHAN/PSEUDOEPHED 2.5-7.5/.8
DROPS ORAL
Status: COMPLETED | OUTPATIENT
Start: 2024-05-01 | End: 2024-05-01

## 2024-05-01 RX ADMIN — PROPOFOL 50 MG: 10 INJECTION, EMULSION INTRAVENOUS at 10:05

## 2024-05-01 RX ADMIN — PROPOFOL 40 MG: 10 INJECTION, EMULSION INTRAVENOUS at 10:05

## 2024-05-01 RX ADMIN — SODIUM CHLORIDE, SODIUM LACTATE, POTASSIUM CHLORIDE, AND CALCIUM CHLORIDE: 600; 310; 30; 20 INJECTION, SOLUTION INTRAVENOUS at 10:05

## 2024-05-01 RX ADMIN — PROPOFOL 30 MG: 10 INJECTION, EMULSION INTRAVENOUS at 10:05

## 2024-05-01 NOTE — H&P
"PRE PROCEDURE H&P    Patient Name: Anupama Olivo  MRN: [id number]  : 1955  Date of Procedure:  2024  Referring Physician: Kim Krueger MD  Primary Physician: Kim Krueger MD  Procedure Physician: Geovanna Castro MD       Planned Procedure: Colonoscopy  Diagnosis: previous adenomatous polyp  Chief Complaint: Same as above    HPI: Patient is an 69 y.o. female is here for the above.     Last colonoscopy:   Family history: None  Anticoagulation: None     Past Medical History:   Past Medical History:   Diagnosis Date    Anxiety     Diabetes mellitus     Pre Diabetes    GERD (gastroesophageal reflux disease)     HLD (hyperlipidemia)     HTN (hypertension)     Hypothyroid     Insulin resistance     MDD (major depressive disorder)     Obesity     Secondary DM with CKD stage 3 and hypertension 02/15/2023    Type 2 diabetes mellitus         Past Surgical History:  Past Surgical History:   Procedure Laterality Date    APPENDECTOMY  1982    BARTHOLIN GLAND CYST EXCISION Left 2010    Dr ABDOULAYE Fang    BREAST BIOPSY Right     20 years ago. benign.    BREAST SURGERY      CATARACT EXTRACTION EXTRACAPSULAR W/ INTRAOCULAR LENS IMPLANTATION Bilateral     CHOLECYSTECTOMY  2014    lap choly for biliary dyskinesia    COLON SURGERY      COLONOSCOPY N/A 2022    Procedure: COLONOSCOPY;  Surgeon: Aliyah Otoole MD;  Location: [facility];  Service: Endoscopy;  Laterality: N/A;    COLONOSCOPY N/A 5/3/2023    Procedure: COLONOSCOPY;  Surgeon: Aliyah Otoole MD;  Location: [facility];  Service: Endoscopy;  Laterality: N/A;    EYE SURGERY      FRACTURE SURGERY      HYSTERECTOMY      OOPHORECTOMY      ORIF WRIST FRACTURE      comminuted intraarticular fx of distal Lt radius w/assoc avulsion fx styloid process of ulna - "metalplate    rectocele   repair          Home Medications:  Prior to Admission medications    Medication Sig Start Date End Date Taking? Authorizing Provider   bisacodyL (DULCOLAX, " BISACODYL,) 5 mg EC tablet Take 2 tablets (10 mg total) by mouth daily as needed for Constipation. 2/21/24  Kim Schofield MD   blood sugar diagnostic (TRUETEST TEST STRIPS) Strp 1 strip by Misc.(Non-Drug; Combo Route) route 4 (four) times daily with meals and nightly. [employer] True Metrix Test Strips 4/25/23  Kim Schofield MD   blood-glucose meter (TRUE METRIX AIR GLUCOSE METER) kit USE AS DIRECTED 4/25/23  Kim Schofield MD   lancets (TRUEPLUS LANCETS) 28 gauge Misc 1 lancet by Misc.(Non-Drug; Combo Route) route 4 (four) times daily with meals and nightly. [employer] brand 4/20/23  Kim Schofield MD   levothyroxine (SYNTHROID) 137 MCG Tab tablet TAKE 1 TABLET BY MOUTH BEFORE BREAKFAST. 2/21/24  Kim Schofield MD   lovastatin (MEVACOR) 40 MG tablet Take 1 tablet (40 mg total) by mouth nightly. 2/21/24  Kim Schofield MD   metFORMIN (GLUCOPHAGE-XR) 500 MG ER 24hr tablet TAKE 4 TABLETS BY MOUTH ONCE DAILY FOR DIABETES 2/21/24  Kim Schofield MD   omeprazole (PRILOSEC) 40 MG capsule Take 1 capsule (40 mg total) by mouth once daily. 2/21/24  Kim Schofield MD   paroxetine (PAXIL) 10 MG tablet Take 1 tablet (10 mg total) by mouth once daily. With the 40mg for total daily dose of 50mg in 24 hrs. 2/21/24  Kim Schofield MD   paroxetine (PAXIL) 40 MG tablet Take 1 tablet (40 mg total) by mouth every evening. With the 10mg for total daily dose of 50mg in 24 hrs. 2/21/24  Kim Schofield MD   traZODone (DESYREL) 50 MG tablet Take 1 tablet (50 mg total) by mouth every evening. 2/21/24  Kim Schofield MD   triamterene-hydrochlorothiazide 75-50 mg (MAXZIDE) 75-50 mg per tablet TAKE 1 TABLET BY MOUTH ONE TIME DAILY 2/21/24  Kim Schofield MD   aspirin (ECOTRIN) 81 MG EC tablet Take 1 tablet by mouth Daily. 5/12/12   Provider, Historical        Allergies:  Review of patient's allergies indicates:   Allergen Reactions    Betamethasone sodium phosphate      Sweat and  "depression    Codeine      Other reaction(s): Shortness of breath    Loratadine      Other reaction(s): Felt distant    Prednisone      Other reaction(s): sweat and depression    Tomato (solanum lycopersicum) Hives and Rash        Social History:   Social History     Socioeconomic History    Marital status:     Number of children: 2   Occupational History    Occupation: retired   Tobacco Use    Smoking status: Never    Smokeless tobacco: Never   Substance and Sexual Activity    Alcohol use: Not Currently     Alcohol/week: 0.0 standard drinks of alcohol     Comment: rarely // once a year    Drug use: Never    Sexual activity: Not Currently     Partners: Male     Birth control/protection: None     Social Determinants of Health     Food Insecurity: No Food Insecurity (2/19/2024)    Hunger Vital Sign     Worried About Running Out of Food in the Last Year: Never true     Ran Out of Food in the Last Year: Never true   Transportation Needs: No Transportation Needs (2/19/2024)    PRAPARE - Transportation     Lack of Transportation (Medical): No     Lack of Transportation (Non-Medical): No   Stress: Stress Concern Present (2/19/2024)    [employer] - Occupational Stress Questionnaire     Feeling of Stress : To some extent   Housing Stability: Unknown (2/19/2024)    Housing Stability Vital Sign     Unable to Pay for Housing in the Last Year: No     Unstable Housing in the Last Year: No       Family History:  Family History   Problem Relation Name Age of Onset    Diabetes Other      Colon cancer Maternal Grandmother      Colon cancer Cousin      Colon cancer Cousin      Breast cancer Neg Hx         ROS: No acute cardiac events, no acute respiratory complaints.     Physical Exam (all patients):    BP (!) 183/83 (BP Location: Left arm, Patient Position: Sitting)   Pulse 62   Temp 97.9 °F (36.6 °C)   Resp 15   Ht 5' 3" (1.6 m)   Wt 100.8 kg (222 lb 3.6 oz)   SpO2 100%   Breastfeeding No "   BMI 39.37 kg/m²   Lungs: Clear to auscultation bilaterally, respirations unlabored  Heart: Regular rate and rhythm, S1 and S2 normal, no obvious murmurs  Abdomen:         Soft, non-tender, bowel sounds normal, no masses, no organomegaly    Lab Results   Component Value Date    WBC 8.51 02/21/2024    MCV 92 02/21/2024    RDW 14.4 02/21/2024     02/21/2024    GLU 94 02/21/2024    HGBA1C 5.9 (H) 02/21/2024    BUN 10 02/21/2024     02/21/2024    K 4.2 02/21/2024     02/21/2024        SEDATION PLAN: per anesthesia      History reviewed, vital signs satisfactory, cardiopulmonary status satisfactory, sedation options, risks and plans have been discussed with the patient  All their questions were answered and the patient agrees to the sedation procedures as planned and the patient is deemed an appropriate candidate for the sedation as planned.    Procedure explained to patient, informed consent obtained and placed in chart.    Geovanna Castro  5/1/2024  10:35 AM

## 2024-05-01 NOTE — ANESTHESIA PREPROCEDURE EVALUATION
05/01/2024  Anupama Olivo is a 69 y.o., female.    Patient Active Problem List   Diagnosis    Recurrent major depression in full remission    Essential hypertension    GERD (gastroesophageal reflux disease)    Mixed diabetic hyperlipidemia associated with type 2 diabetes mellitus    Hypothyroidism due to acquired atrophy of thyroid    Severe obesity (BMI 35.0-39.9) with comorbidity    History of colon polyps    Controlled type 2 diabetes mellitus    Secondary DM with CKD stage 3 and hypertension    Unspecified psychosis not due to a substance or known physiological condition    Chronic kidney disease, stage 3a     Past Medical History:   Diagnosis Date    Anxiety     Diabetes mellitus     Pre Diabetes    GERD (gastroesophageal reflux disease)     HLD (hyperlipidemia)     HTN (hypertension)     Hypothyroid     Insulin resistance     MDD (major depressive disorder)     Obesity     Secondary DM with CKD stage 3 and hypertension 02/15/2023    Type 2 diabetes mellitus      Past Surgical History:   Procedure Laterality Date    APPENDECTOMY  1982    BARTHOLIN GLAND CYST EXCISION Left 05/12/2010    Dr ABDOULAYE Fang    BREAST BIOPSY Right     20 years ago. benign.    BREAST SURGERY      CATARACT EXTRACTION EXTRACAPSULAR W/ INTRAOCULAR LENS IMPLANTATION Bilateral     CHOLECYSTECTOMY  11/12/2014    lap choly for biliary dyskinesia    COLON SURGERY      COLONOSCOPY N/A 01/18/2022    Procedure: COLONOSCOPY;  Surgeon: Aliyah Otoole MD;  Location: [facility];  Service: Endoscopy;  Laterality: N/A;    COLONOSCOPY N/A 5/3/2023    Procedure: COLONOSCOPY;  Surgeon: Aliyah Otoole MD;  Location: [facility];  Service: Endoscopy;  Laterality: N/A;    EYE SURGERY      FRACTURE SURGERY      HYSTERECTOMY      OOPHORECTOMY      ORIF WRIST FRACTURE  2009    comminuted intraarticular fx of distal Lt radius w/assoc avulsion fx styloid  "process of ulna - "metalplate    rectocele   repair           Pre-op Assessment    I have reviewed the Patient Summary Reports.    I have reviewed the NPO Status.   I have reviewed the Medications.     Review of Systems  Anesthesia Hx:  No problems with previous Anesthesia   History of prior surgery of interest to airway management or planning:  Previous anesthesia: General           Social:  Non-Smoker       Cardiovascular:     Hypertension                                        Renal/:  Chronic Renal Disease, CKD                Hepatic/GI:     GERD             Endocrine:  Diabetes, type 2 Hypothyroidism        Obesity / BMI > 30  Psych:  Psychiatric History  depression                Physical Exam  General: Cooperative and Alert    Airway:  Mallampati: II   Mouth Opening: Normal  TM Distance: Normal  Tongue: Normal  Neck ROM: Normal ROM    Dental:  Intact      Results for orders placed or performed in visit on 06/27/17   SCHEDULED EKG 12-LEAD (to Muse)    Collection Time: 06/27/17  2:40 PM    Narrative    Test Reason : I10  Blood Pressure : / mmHG  Vent. Rate : 065 BPM     Atrial Rate : 065 BPM     P-R Int : 160 ms          QRS Dur : 088 ms      QT Int : 396 ms       P-R-T Axes : -09 028 035 degrees     QTc Int : 411 ms    Normal sinus rhythm  Normal ECG  When compared with ECG of 13-FEB-2017 15:12,  No significant change was found  Confirmed by CLAUDETTE MAGANA MD ([id number]) on 6/27/2017 6:21:22 PM    Referred By: KRISTY MAGUIRE           Confirmed By:CLAUDETTE MAGANA MD         Anesthesia Plan  Type of Anesthesia, risks & benefits discussed:    Anesthesia Type: MAC, Gen Natural Airway  Intra-op Monitoring Plan: Standard ASA Monitors  Induction:  IV  Informed Consent: Informed consent signed with the Patient and all parties understand the risks and agree with anesthesia plan.  All questions answered.   ASA Score: 3  Day of Surgery Review of History & Physical: H&P Update referred to the surgeon/provider.    Ready For " Surgery From Anesthesia Perspective.     .       social work

## 2024-05-01 NOTE — ANESTHESIA POSTPROCEDURE EVALUATION
Anesthesia Post Evaluation    Patient: Anupama Olivo    Procedure(s) Performed: Procedure(s) (LRB):  COLONOSCOPY (N/A)    Final Anesthesia Type: MAC      Patient location during evaluation: GI PACU  Patient participation: Yes- Able to Participate  Level of consciousness: awake  Post-procedure vital signs: reviewed and stable  Pain management: adequate  Airway patency: patent    PONV status at discharge: No PONV  Anesthetic complications: no      Cardiovascular status: blood pressure returned to baseline and hemodynamically stable  Respiratory status: unassisted and spontaneous ventilation  Hydration status: euvolemic  Follow-up not needed.              Vitals Value Taken Time   /80 05/01/24 1112   Temp 36.1 °C (97 °F) 05/01/24 1103   Pulse 64 05/01/24 1112   Resp 18 05/01/24 1112   SpO2 100 % 05/01/24 1112         Event Time   Out of Recovery 11:46:07         Pain/Jose De Jesus Score: Jose De Jesus Score: 10 (5/1/2024 11:12 AM)

## 2024-05-01 NOTE — TRANSFER OF CARE
"Anesthesia Transfer of Care Note    Patient: Anupama Olivo    Procedure(s) Performed: Procedure(s) (LRB):  COLONOSCOPY (N/A)    Patient location: PACU    Anesthesia Type: MAC    Transport from OR: Transported from OR on room air with adequate spontaneous ventilation    Post pain: adequate analgesia    Post assessment: no apparent anesthetic complications    Post vital signs: stable    Level of consciousness: awake    Nausea/Vomiting: no nausea/vomiting    Complications: none    Transfer of care protocol was followed      Last vitals: Visit Vitals  /65   Pulse 62   Temp 36.1 °C (97 °F)   Resp 17   Ht 5' 3" (1.6 m)   Wt 100.8 kg (222 lb 3.6 oz)   SpO2 99%   Breastfeeding No   BMI 39.37 kg/m²     "

## 2024-05-01 NOTE — [employer] PATIENT INSTRUCTIONS
Discharge Summary/Instructions after an Endoscopic Procedure  Patient Name: Anupama Olivo  Patient MRN: [id number]  Patient YOB: 1955  Wednesday, May 1, 2024 Geovanna Castro MD  Dear patient,  As a result of recent federal legislation (The Federal Cures Act), you may   receive lab or pathology results from your procedure in your [facility]   account before your physician is able to contact you. Your physician or   their representative will relay the results to you with their   recommendations at their soonest availability.  Thank you,  RESTRICTIONS:  During your procedure today, you received medications for sedation.  These   medications may affect your judgment, balance and coordination.  Therefore,   for 24 hours, you have the following restrictions:   - DO NOT drive a car, operate machinery, make legal/financial decisions,   sign important papers or drink alcohol.    ACTIVITY:  Today: no heavy lifting, straining or running due to procedural   sedation/anesthesia.  The following day: return to full activity including work.  DIET:  Eat and drink normally unless instructed otherwise.     TREATMENT FOR COMMON SIDE EFFECTS:  - Mild abdominal pain, nausea, belching, bloating or excessive gas:  rest,   eat lightly and use a heating pad.  - Sore Throat: treat with throat lozenges and/or gargle with warm salt   water.  - Because air was used during the procedure, expelling large amounts of air   from your rectum or belching is normal.  - If a bowel prep was taken, you may not have a bowel movement for 1-3 days.    This is normal.  SYMPTOMS TO WATCH FOR AND REPORT TO YOUR PHYSICIAN:  1. Abdominal pain or bloating, other than gas cramps.  2. Chest pain.  3. Back pain.  4. Signs of infection such as: chills or fever occurring within 24 hours   after the procedure.  5. Rectal bleeding, which would show as bright red, maroon, or black stools.   (A tablespoon of blood from the rectum is not serious, especially  if   hemorrhoids are present.)  6. Vomiting.  7. Weakness or dizziness.  GO DIRECTLY TO THE NEAREST EMERGENCY ROOM IF YOU HAVE ANY OF THE FOLLOWING:      Difficulty breathing              Chills and/or fever over 101 F   Persistent vomiting and/or vomiting blood   Severe abdominal pain   Severe chest pain   Black, tarry stools   Bleeding- more than one tablespoon   Any other symptom or condition that you feel may need urgent attention  Your doctor recommends these additional instructions:  If any biopsies were taken, your doctors clinic will contact you in 1 to 2   weeks with any results.  - Discharge patient to home.   - Resume previous diet.   - Continue present medications.   - Repeat colonoscopy in 3 years for surveillance.   - Return to referring physician.   - Patient has a contact number available for emergencies.  The signs and   symptoms of potential delayed complications were discussed with the   patient.  Return to normal activities tomorrow.  Written discharge   instructions were provided to the patient.  For questions, problems or results please call your physician Geovanna Castro MD at Work:  [phone]  If you have any questions about the above instructions, call the GI   department at [phone] or call the endoscopy unit at [phone]   from 7am until 3 pm.  [facility] [place], EMERGENCY ROOM PHONE NUMBER:   [phone]  IF A COMPLICATION OR EMERGENCY SITUATION ARISES AND YOU ARE UNABLE TO REACH   YOUR PHYSICIAN - GO DIRECTLY TO THE EMERGENCY ROOM.  I have read or have had read to me these discharge instructions for my   procedure and have received a written copy.  I understand these   instructions and will follow-up with my physician if I have any questions.     __________________________________       _____________________________________  Nurse Signature                                          Patient/Designated   Responsible Party Signature  Geovanna LIRA  MD Matthew  5/1/2024 11:03:31 AM  This report has been verified and signed electronically.  Dear patient,  As a result of recent federal legislation (The Federal Cures Act), you may   receive lab or pathology results from your procedure in your [facility]   account before your physician is able to contact you. Your physician or   their representative will relay the results to you with their   recommendations at their soonest availability.  Thank you,  [employer]ATION

## 2024-05-02 VITALS
BODY MASS INDEX: 39.38 KG/M2 | WEIGHT: 222.25 LBS | DIASTOLIC BLOOD PRESSURE: 80 MMHG | RESPIRATION RATE: 18 BRPM | HEIGHT: 63 IN | SYSTOLIC BLOOD PRESSURE: 132 MMHG | HEART RATE: 64 BPM | TEMPERATURE: 97 F | OXYGEN SATURATION: 100 %

## 2025-02-05 ENCOUNTER — LAB VISIT (OUTPATIENT)
Dept: LAB | Facility: HOSPITAL | Age: 70
End: 2025-02-05
Attending: FAMILY MEDICINE
Payer: MEDICARE

## 2025-02-05 ENCOUNTER — OFFICE VISIT (OUTPATIENT)
Dept: PRIMARY CARE CLINIC | Facility: CLINIC | Age: 70
End: 2025-02-05
Payer: MEDICARE

## 2025-02-05 VITALS
HEART RATE: 75 BPM | BODY MASS INDEX: 39.66 KG/M2 | TEMPERATURE: 99 F | SYSTOLIC BLOOD PRESSURE: 136 MMHG | WEIGHT: 223.88 LBS | DIASTOLIC BLOOD PRESSURE: 74 MMHG | OXYGEN SATURATION: 95 % | RESPIRATION RATE: 18 BRPM

## 2025-02-05 DIAGNOSIS — E11.9 DIABETES MELLITUS WITH COINCIDENT HYPERTENSION: ICD-10-CM

## 2025-02-05 DIAGNOSIS — Z00.00 ROUTINE GENERAL MEDICAL EXAMINATION AT A HEALTH CARE FACILITY: ICD-10-CM

## 2025-02-05 DIAGNOSIS — E03.4 HYPOTHYROIDISM DUE TO ACQUIRED ATROPHY OF THYROID: ICD-10-CM

## 2025-02-05 DIAGNOSIS — K21.9 GASTROESOPHAGEAL REFLUX DISEASE, UNSPECIFIED WHETHER ESOPHAGITIS PRESENT: ICD-10-CM

## 2025-02-05 DIAGNOSIS — E78.5 DYSLIPIDEMIA: ICD-10-CM

## 2025-02-05 DIAGNOSIS — N18.31 CHRONIC KIDNEY DISEASE, STAGE 3A: ICD-10-CM

## 2025-02-05 DIAGNOSIS — L30.9 DERMATITIS: ICD-10-CM

## 2025-02-05 DIAGNOSIS — F29 UNSPECIFIED PSYCHOSIS NOT DUE TO A SUBSTANCE OR KNOWN PHYSIOLOGICAL CONDITION: ICD-10-CM

## 2025-02-05 DIAGNOSIS — I10 ESSENTIAL HYPERTENSION: ICD-10-CM

## 2025-02-05 DIAGNOSIS — Z12.31 ENCOUNTER FOR SCREENING MAMMOGRAM FOR BREAST CANCER: ICD-10-CM

## 2025-02-05 DIAGNOSIS — E66.01 SEVERE OBESITY (BMI 35.0-39.9) WITH COMORBIDITY: ICD-10-CM

## 2025-02-05 DIAGNOSIS — Z00.00 ROUTINE GENERAL MEDICAL EXAMINATION AT A HEALTH CARE FACILITY: Primary | ICD-10-CM

## 2025-02-05 DIAGNOSIS — F33.42 RECURRENT MAJOR DEPRESSION IN FULL REMISSION: Chronic | ICD-10-CM

## 2025-02-05 DIAGNOSIS — E11.69 MIXED DIABETIC HYPERLIPIDEMIA ASSOCIATED WITH TYPE 2 DIABETES MELLITUS: ICD-10-CM

## 2025-02-05 DIAGNOSIS — I10 DIABETES MELLITUS WITH COINCIDENT HYPERTENSION: ICD-10-CM

## 2025-02-05 DIAGNOSIS — E78.2 MIXED DIABETIC HYPERLIPIDEMIA ASSOCIATED WITH TYPE 2 DIABETES MELLITUS: ICD-10-CM

## 2025-02-05 LAB
ALBUMIN/CREAT UR: 5.2 UG/MG (ref 0–30)
CREAT UR-MCNC: 212 MG/DL (ref 15–325)
MICROALBUMIN UR DL<=1MG/L-MCNC: 11 UG/ML

## 2025-02-05 PROCEDURE — 1159F MED LIST DOCD IN RCRD: CPT | Mod: CPTII,S$GLB,, | Performed by: FAMILY MEDICINE

## 2025-02-05 PROCEDURE — 1126F AMNT PAIN NOTED NONE PRSNT: CPT | Mod: CPTII,S$GLB,, | Performed by: FAMILY MEDICINE

## 2025-02-05 PROCEDURE — 3075F SYST BP GE 130 - 139MM HG: CPT | Mod: CPTII,S$GLB,, | Performed by: FAMILY MEDICINE

## 2025-02-05 PROCEDURE — 1160F RVW MEDS BY RX/DR IN RCRD: CPT | Mod: CPTII,S$GLB,, | Performed by: FAMILY MEDICINE

## 2025-02-05 PROCEDURE — 3288F FALL RISK ASSESSMENT DOCD: CPT | Mod: CPTII,S$GLB,, | Performed by: FAMILY MEDICINE

## 2025-02-05 PROCEDURE — 3078F DIAST BP <80 MM HG: CPT | Mod: CPTII,S$GLB,, | Performed by: FAMILY MEDICINE

## 2025-02-05 PROCEDURE — 82043 UR ALBUMIN QUANTITATIVE: CPT | Performed by: FAMILY MEDICINE

## 2025-02-05 PROCEDURE — 3008F BODY MASS INDEX DOCD: CPT | Mod: CPTII,S$GLB,, | Performed by: FAMILY MEDICINE

## 2025-02-05 PROCEDURE — 99397 PER PM REEVAL EST PAT 65+ YR: CPT | Mod: S$GLB,,, | Performed by: FAMILY MEDICINE

## 2025-02-05 PROCEDURE — 99999 PR PBB SHADOW E&M-EST. PATIENT-LVL III: CPT | Mod: PBBFAC,,, | Performed by: FAMILY MEDICINE

## 2025-02-05 PROCEDURE — 1101F PT FALLS ASSESS-DOCD LE1/YR: CPT | Mod: CPTII,S$GLB,, | Performed by: FAMILY MEDICINE

## 2025-02-05 RX ORDER — MUPIROCIN 20 MG/G
OINTMENT TOPICAL 3 TIMES DAILY
Qty: 30 G | Refills: 2 | Status: SHIPPED | OUTPATIENT
Start: 2025-02-05

## 2025-02-05 RX ORDER — TRIAMTERENE AND HYDROCHLOROTHIAZIDE 75; 50 MG/1; MG/1
TABLET ORAL
Qty: 90 TABLET | Refills: 3 | Status: SHIPPED | OUTPATIENT
Start: 2025-02-05

## 2025-02-05 RX ORDER — LEVOTHYROXINE SODIUM 137 UG/1
TABLET ORAL
Qty: 90 TABLET | Refills: 3 | Status: SHIPPED | OUTPATIENT
Start: 2025-02-05

## 2025-02-05 RX ORDER — OMEPRAZOLE 40 MG/1
40 CAPSULE, DELAYED RELEASE ORAL DAILY
Qty: 90 CAPSULE | Refills: 3 | Status: SHIPPED | OUTPATIENT
Start: 2025-02-05

## 2025-02-05 RX ORDER — TRAZODONE HYDROCHLORIDE 50 MG/1
50 TABLET ORAL NIGHTLY
Qty: 90 TABLET | Refills: 3 | Status: SHIPPED | OUTPATIENT
Start: 2025-02-05

## 2025-02-05 RX ORDER — METFORMIN HYDROCHLORIDE 500 MG/1
TABLET, EXTENDED RELEASE ORAL
Qty: 360 TABLET | Refills: 3 | Status: SHIPPED | OUTPATIENT
Start: 2025-02-05

## 2025-02-05 RX ORDER — LOVASTATIN 40 MG/1
40 TABLET ORAL NIGHTLY
Qty: 90 TABLET | Refills: 3 | Status: SHIPPED | OUTPATIENT
Start: 2025-02-05

## 2025-02-05 RX ORDER — PAROXETINE HYDROCHLORIDE 40 MG/1
40 TABLET, FILM COATED ORAL NIGHTLY
Qty: 90 TABLET | Refills: 3 | Status: SHIPPED | OUTPATIENT
Start: 2025-02-05

## 2025-02-05 RX ORDER — PAROXETINE 10 MG/1
10 TABLET, FILM COATED ORAL DAILY
Qty: 90 TABLET | Refills: 3 | Status: SHIPPED | OUTPATIENT
Start: 2025-02-05

## 2025-02-05 NOTE — PROGRESS NOTES
Chief Complaint  Chief Complaint   Patient presents with    Annual Exam       HPI  Anupama Olivo is a 69 y.o. female with multiple medical diagnoses as listed in the medical history and problem list that presents for  in person visit.     History of Present Illness    CHIEF COMPLAINT:  - Patient presents for an annual wellness visit and anal exam, with labs scheduled for today.    HPI:  Patient engages in standing exercises to improve strength. She reports facial skin issues and seeks topical treatment for small lesions she has been manipulating. She describes knee pain with significant crepitus and applies hemp cream containing lidocaine, which she finds beneficial. Patient expresses concerns about finding appropriate brassiere support due to significant breast ptosis without proper undergarment support.    Patient denies any new or worsening symptoms.    MEDICATIONS:  - Levothyroxine 137 mcg, daily, for hypothyroidism  - Lovastatin, daily, for cholesterol  - Metformin, 4 tablets daily, for diabetes  - Omeprazole, daily, for acid indigestion  - Paroxetine (Paxil) 10 mg and 40 mg, daily together, for mood disorder  - Trazodone, daily, for sleep  - Triamterene-Hydrochlorothiazide, daily, for blood pressure and fluid removal  - Hemp cream with lidocaine, as needed, applied to knees for pain    PMH:  - Diabetes  - Hypertension  - Hypothyroidism  - Chronic kidney disease, stage 3A  - Mood disorders  - Severe obesity         [facility]    2/3/2025  1:52 PM CST - Filed by Patient   This questionnaire should take approximately 5 to 10 minutes to complete.  To begin, press Let's Begin and then press Continue. Let's Begin   On average, how many days per week do you engage in moderate to strenuous exercise (like a brisk walk)? 1 day   On average, how many minutes do you engage in exercise at this level? 50 min   Do you feel stress - tense, restless, nervous, or anxious, or unable to sleep at night because your mind is  troubled all the time - these days? Not at all   How often do you feel lonely or isolated from those around you? Sometimes   How often do you need to have someone help you when you read instructions, pamphlets, or other written material from your doctor or pharmacy? Sometimes   How hard is it for you to pay for the very basics like food, housing, medical care, and heating? Somewhat hard   In the past 12 months has the electric, gas, oil, or water company threatened to shut off services in your home? No   Within the past 12 months, you worried that your food would run out before you got the money to buy more. Sometimes true   Within the past 12 months, the food you bought just didn't last and you didn't have money to get more. Sometimes true   Has the lack of transportation kept you from medical appointments, meetings, work or from getting things needed for daily living? No   In the last 12 months, was there a time when you were not able to pay the mortgage or rent on time? Patient declined   In the last 12 months, was there a time when you did not have a steady place to sleep or slept in a shelter (including now)? No   How often do you have a drink containing alcohol? Monthly or less   How many drinks containing alcohol do you have on a typical day when you are drinking? Patient does not drink   How often do you have six or more drinks on one occasion? Never            Pmh, Psh, Family Hx, Social Hx, HM updated in Epic Tabs today.    Review of Systems   Constitutional:  Negative for activity change, appetite change and fatigue.   Respiratory:  Negative for cough and shortness of breath.    Cardiovascular:  Negative for chest pain and palpitations.   Gastrointestinal:  Negative for abdominal distention and abdominal pain.   Musculoskeletal:  Positive for arthralgias.   Skin:  Positive for wound.   Psychiatric/Behavioral:  Negative for dysphoric mood and sleep disturbance. The patient is not nervous/anxious.          Objective:     Vitals:    02/05/25 0833   BP: 136/74   BP Location: Right arm   Patient Position: Sitting   Pulse: 75   Resp: 18   Temp: 98.5 °F (36.9 °C)   SpO2: 95%   Weight: 101.5 kg (223 lb 14 oz)     Wt Readings from Last 10 Encounters:   02/05/25 101.5 kg (223 lb 14 oz)   05/01/24 100.8 kg (222 lb 3.6 oz)   02/21/24 100.8 kg (222 lb 1.8 oz)   05/03/23 100.5 kg (221 lb 9 oz)   05/02/23 102 kg (224 lb 13.9 oz)   02/15/23 100.8 kg (222 lb 1.8 oz)   10/13/22 98.9 kg (218 lb)   08/15/22 99.7 kg (219 lb 12.8 oz)   02/14/22 96.1 kg (211 lb 12 oz)   01/18/22 96.2 kg (211 lb 15.6 oz)     Physical Exam    IMAGING:  - Mammogram: Last completed 2 years ago.       Physical Exam  Vitals reviewed.   Constitutional:       Appearance: Normal appearance. She is well-developed and well-groomed. She is obese.   HENT:      Head: Normocephalic and atraumatic.      Right Ear: Tympanic membrane and external ear normal.      Left Ear: Tympanic membrane and external ear normal.      Nose: Nose normal.      Mouth/Throat:      Mouth: Mucous membranes are moist.      Pharynx: Oropharynx is clear. No oropharyngeal exudate.   Eyes:      Extraocular Movements: Extraocular movements intact.      Conjunctiva/sclera: Conjunctivae normal.      Pupils: Pupils are equal, round, and reactive to light.   Neck:      Thyroid: No thyromegaly.   Cardiovascular:      Rate and Rhythm: Normal rate and regular rhythm.      Pulses:           Dorsalis pedis pulses are 2+ on the right side and 2+ on the left side.      Heart sounds: Normal heart sounds.   Pulmonary:      Effort: Pulmonary effort is normal.      Breath sounds: Normal breath sounds.   Abdominal:      General: Bowel sounds are normal.      Palpations: Abdomen is soft.   Musculoskeletal:         General: Normal range of motion.      Cervical back: Normal range of motion and neck supple.      Right foot: Normal range of motion. No deformity.      Left foot: Normal range of motion. No deformity.    Feet:      Right foot:      Protective Sensation: 5 sites tested.  5 sites sensed.      Skin integrity: Warmth present. No ulcer, blister, skin breakdown or erythema.      Left foot:      Protective Sensation: 5 sites tested.  5 sites sensed.      Skin integrity: Warmth present. No ulcer, blister, skin breakdown or erythema.   Skin:     General: Skin is warm and dry.      Findings: Erythema and lesion present.      Comments: On face from picking and mild ulceration    Neurological:      General: No focal deficit present.      Mental Status: She is alert and oriented to person, place, and time. Mental status is at baseline.      Sensory: No sensory deficit.      Motor: No atrophy or abnormal muscle tone.   Psychiatric:         Mood and Affect: Mood normal.         Speech: Speech normal.         Behavior: Behavior normal. Behavior is cooperative.         Thought Content: Thought content normal.         Judgment: Judgment normal.         Assessment:   LABS:   Lab Results   Component Value Date    HGBA1C 5.9 (H) 02/21/2024    HGBA1C 5.9 (H) 02/15/2023    HGBA1C 5.6 08/15/2022      Lab Results   Component Value Date    CHOL 162 02/21/2024    CHOL 169 02/15/2023    CHOL 164 02/14/2022     Lab Results   Component Value Date    LDLCALC 89.2 02/21/2024    LDLCALC 90.2 02/15/2023    LDLCALC 91.4 02/14/2022     Lab Results   Component Value Date    WBC 8.51 02/21/2024    HGB 13.8 02/21/2024    HCT 43.9 02/21/2024     02/21/2024    CHOL 162 02/21/2024    TRIG 109 02/21/2024    HDL 51 02/21/2024    ALT 22 02/21/2024    AST 28 02/21/2024     02/21/2024    K 4.2 02/21/2024     02/21/2024    CREATININE 0.9 02/21/2024    BUN 10 02/21/2024    CO2 24 02/21/2024    TSH 1.349 02/21/2024    HGBA1C 5.9 (H) 02/21/2024       Plan:   Assessment & Plan    E66.01 Severe obesity (BMI 35.0-39.9) with comorbidity  F29 Unspecified psychosis not due to a substance or known physiological condition  N18.31 Chronic kidney disease,  stage 3a  Z00.00 Routine general medical exam at a health care facility  E78.5 Dyslipidemia  E03.4 Hypothyroidism due to acquired atrophy of thyroid  E11.69, E78.2 Mixed diabetic hyperlipidemia associated with type 2 diabetes mellitus  I10 Essential hypertension  K21.9 Gastroesophageal reflux disease, unspecified whether esophagitis present  E11.9, I10 Diabetes mellitus with coincident hypertension  F33.42 Recurrent major depression in full remission  L30.9 Dermatitis  Z12.31 Encounter for screening mammogram for breast cancer    IMPRESSION:  - Prescribed Mupirocin ointment for facial sores, opting for antibacterial treatment over steroid cream  - Conducted foot exam as part of routine diabetes care  - Reviewed and renewed annual prescriptions for chronic conditions including hypothyroidism, hyperlipidemia, diabetes, acid reflux, depression, insomnia, and hypertension  - Considered mammogram order due to patient being overdue    PLAN SUMMARY:  - Continue metformin 1000 mg 4 times daily for diabetes  - Continue lovastatin for hyperlipidemia  - Continue paroxetine 10 mg and 40 mg daily for depression  - Continue omeprazole for gastroesophageal reflux disease  - Continue trazodone for insomnia  - Continue levothyroxine 137 mcg daily for hypothyroidism  - Continue triamterene-hydrochlorothiazide for hypertension  - Ordered mammogram for breast cancer screening  - Prescribed Mupirocin ointment for facial sores, apply 2-3 times daily  - Refilled all chronic medications for 1 year to [facility]  - Educated patient on steroid vs. antibiotic creams for skin issues    ROUTINE GENERAL MEDICAL EXAMINATION AT A HEALTH CARE FACILITY:  - Refilled all chronic medications for 1 year to [facility] pharmacy.    DYSLIPIDEMIA:  - Continued lovastatin for hyperlipidemia management.    HYPOTHYROIDISM DUE TO ACQUIRED ATROPHY OF THYROID:  - Continued levothyroxine 137 mcg daily for hypothyroidism management.    MIXED DIABETIC  HYPERLIPIDEMIA ASSOCIATED WITH TYPE 2 DIABETES MELLITUS:  - Continued metformin 1000 mg 4 times daily for diabetes management.    ESSENTIAL HYPERTENSION:  - Continued triamterene-hydrochlorothiazide for hypertension management.    GASTROESOPHAGEAL REFLUX DISEASE, UNSPECIFIED WHETHER ESOPHAGITIS PRESENT:  - Continued omeprazole for gastroesophageal reflux disease management.    DIABETES MELLITUS WITH COINCIDENT HYPERTENSION:  - Continued metformin 1000 mg 4 times daily for diabetes management.  - Continued triamterene-hydrochlorothiazide for hypertension management.    RECURRENT MAJOR DEPRESSION IN FULL REMISSION:  - Continued paroxetine 10 mg and 40 mg daily for depression management.  - Continued trazodone for insomnia management.    DERMATITIS:  - Educated the patient about the difference between steroid and antibiotic creams for skin issues.  - Discussed the improbability of topical lidocaine cream affecting blood test results.  - Prescribed Mupirocin ointment for facial sores, to be applied 2-3 times daily.    ENCOUNTER FOR SCREENING MAMMOGRAM FOR BREAST CANCER:  - Ordered a mammogram for breast cancer screening.       Anupama was seen today for annual exam.    Diagnoses and all orders for this visit:    Routine general medical examination at a health care facility  -     Hemoglobin A1C; Future  -     CBC Without Differential; Future  -     Comprehensive Metabolic Panel; Future  -     TSH; Future  -     T4, Free; Future  -     Lipid Panel; Future  -     Microalbumin/Creatinine Ratio, Urine; Future    Dyslipidemia  -     Hemoglobin A1C; Future  -     CBC Without Differential; Future  -     Comprehensive Metabolic Panel; Future  -     TSH; Future  -     T4, Free; Future  -     Lipid Panel; Future  -     Microalbumin/Creatinine Ratio, Urine; Future  -     lovastatin (MEVACOR) 40 MG tablet; Take 1 tablet (40 mg total) by mouth nightly.    Hypothyroidism due to acquired atrophy of thyroid  -     Hemoglobin A1C;  Future  -     CBC Without Differential; Future  -     Comprehensive Metabolic Panel; Future  -     TSH; Future  -     T4, Free; Future  -     Lipid Panel; Future  -     Microalbumin/Creatinine Ratio, Urine; Future  -     levothyroxine (SYNTHROID) 137 MCG Tab tablet; TAKE 1 TABLET BY MOUTH BEFORE BREAKFAST.    Mixed diabetic hyperlipidemia associated with type 2 diabetes mellitus  -     Hemoglobin A1C; Future  -     CBC Without Differential; Future  -     Comprehensive Metabolic Panel; Future  -     TSH; Future  -     T4, Free; Future  -     Lipid Panel; Future  -     Microalbumin/Creatinine Ratio, Urine; Future    Essential hypertension  -     Hemoglobin A1C; Future  -     CBC Without Differential; Future  -     Comprehensive Metabolic Panel; Future  -     TSH; Future  -     T4, Free; Future  -     Lipid Panel; Future  -     Microalbumin/Creatinine Ratio, Urine; Future  -     triamterene-hydrochlorothiazide 75-50 mg (MAXZIDE) 75-50 mg per tablet; TAKE 1 TABLET BY MOUTH ONE TIME DAILY    Gastroesophageal reflux disease, unspecified whether esophagitis present  -     omeprazole (PRILOSEC) 40 MG capsule; Take 1 capsule (40 mg total) by mouth once daily.    Diabetes mellitus with coincident hypertension  -     metFORMIN (GLUCOPHAGE-XR) 500 MG ER 24hr tablet; TAKE 4 TABLETS BY MOUTH ONCE DAILY FOR DIABETES    Chronic kidney disease, stage 3a  -     Hemoglobin A1C; Future  -     CBC Without Differential; Future  -     Comprehensive Metabolic Panel; Future  -     TSH; Future  -     T4, Free; Future  -     Lipid Panel; Future  -     Microalbumin/Creatinine Ratio, Urine; Future    Recurrent major depression in full remission  -     paroxetine (PAXIL) 10 MG tablet; Take 1 tablet (10 mg total) by mouth once daily. With the 40mg for total daily dose of 50mg in 24 hrs.  -     paroxetine (PAXIL) 40 MG tablet; Take 1 tablet (40 mg total) by mouth every evening. With the 10mg for total daily dose of 50mg in 24 hrs.  -     traZODone  (DESYREL) 50 MG tablet; Take 1 tablet (50 mg total) by mouth every evening.    Unspecified psychosis not due to a substance or known physiological condition    Severe obesity (BMI 35.0-39.9) with comorbidity    Dermatitis  -     mupirocin (BACTROBAN) 2 % ointment; Apply topically 3 (three) times daily. Sores on face/ anti-bacterial ointment    Encounter for screening mammogram for breast cancer  -     Mammo Digital Screening Bilat w/ [facility]; Future        DXA Bone Density Spine And Hip  Narrative: EXAMINATION:  DXA BONE DENSITY AXIAL SKELETON 1 OR MORE SITES    CLINICAL HISTORY:  Unspecified menopausal and perimenopausal disorder    TECHNIQUE:  DXA scanning was performed over the left hip and lumbar spine.  Review of the images confirms satisfactory positioning and technique.    COMPARISON:  None    FINDINGS:  The L1-L2 vertebral bone mineral density is equal to 0.997 g/cm squared with a T score of -1.5.    The left femoral neck bone mineral density is equal to 0.755 g/cm squared with a T score of -2.0.    There is a 16.4% risk of a major osteoporotic fracture and a 2.5% risk of hip fracture in the next 10 years (FRAX).  Impression: Osteopenia    Consider FDA approved medical therapies in postmenopausal women and men aged 50 years and older, based on the following:    *A hip or vertebral (clinical or morphometric) fracture  *T score less than or equal to -2.5 at the femoral neck or spine after appropriate evaluation to exclude secondary causes.  *Low bone mass -- also known as osteopenia (T score between -1.0 and -2.5 at the femoral neck or spine) and a 10 year probability of hip fracture greater than or equal to 3% or a 10 year probability of major osteoporosis-related fracture greater than or equal to 20% based on the [place]-adapted WHO algorithm.  *Clinicians judgment and/or patient preference may indicate treatment for people with 10 year fracture probabilities is above or below these levels.    Electronically signed  by: Adriel Sy MD  Date:    06/14/2023  Time:    13:43    The 10-year ASCVD risk score (Kevin LUIS, et al., 2019) is: 17.9%    Values used to calculate the score:      Age: 69 years      Sex: Female      Is Non- : No      Diabetic: Yes      Tobacco smoker: No      Systolic Blood Pressure: 136 mmHg      Is BP treated: Yes      HDL Cholesterol: 51 mg/dL      Total Cholesterol: 162 mg/dL    Follow-up: Follow up in about 1 year (around 2/5/2026) for physical with Dr NUNES.    I spent a total of    30   minutes face to face and non-face to face on the date of this visit.This includes time preparing to see the patient (eg, review of tests, notes), obtaining and/or reviewing additional history from an independent historian and/or outside medical records, documenting clinical information in the electronic health record, independently interpreting results and/or communicating results to the patient/family/caregiver, or care coordinator.  Visit today included increased complexity associated with the care of the episodic problem addressed and managing the longitudinal care of the patient due to the serious and/or complex managed problem(s).    This note was generated with the assistance of ambient listening technology. Verbal consent was obtained by the patient and accompanying visitor(s) for the recording of patient appointment to facilitate this note. I attest to having reviewed and edited the generated note for accuracy, though some syntax or spelling errors may persist. Please contact the author of this note for any clarification.       There are no Patient Instructions on file for this visit.

## 2025-02-16 ENCOUNTER — RESULTS FOLLOW-UP (OUTPATIENT)
Dept: PRIMARY CARE CLINIC | Facility: CLINIC | Age: 70
End: 2025-02-16

## 2025-02-17 NOTE — PROGRESS NOTES
Anupama Olivo,     Your labs have been reviewed. Your cholesterol, sugar, blood counts, kidney functions, liver functions, and thyroid functions are within acceptable ranges for your age and conditions. A1c is a little bit higher but still prediabetic range. Work on your diet and continue with 4 metformin tablets in a day.     Any other abnormalities that you see at this time, have been reviewed, and are not indicative of needing additional workup or concern.  Please continue on your current treatment plans.     Please let me know if you have further questions.     Sincerely,   Kim Krueger MD

## 2025-02-24 ENCOUNTER — HOSPITAL ENCOUNTER (OUTPATIENT)
Dept: RADIOLOGY | Facility: HOSPITAL | Age: 70
Discharge: HOME | End: 2025-02-24
Attending: FAMILY MEDICINE
Payer: MEDICARE

## 2025-02-24 DIAGNOSIS — Z12.31 ENCOUNTER FOR SCREENING MAMMOGRAM FOR BREAST CANCER: ICD-10-CM

## 2025-02-24 PROCEDURE — 77067 SCR MAMMO BI INCL CAD: CPT | Mod: 26,,, | Performed by: STUDENT IN AN ORGANIZED HEALTH CARE EDUCATION/TRAINING PROGRAM

## 2025-02-24 PROCEDURE — 77067 SCR MAMMO BI INCL CAD: CPT | Mod: TC,PN

## 2025-02-24 PROCEDURE — 77063 BREAST TOMOSYNTHESIS BI: CPT | Mod: 26,,, | Performed by: STUDENT IN AN ORGANIZED HEALTH CARE EDUCATION/TRAINING PROGRAM

## (undated) DEVICE — APPLICATOR CHLORAPREP ORN 26ML

## (undated) DEVICE — SEE MEDLINE ITEM 157117

## (undated) DEVICE — SUPPORT ULNA NERVE PROTECTOR

## (undated) DEVICE — OBTURATOR BLADELESS 8MM XI

## (undated) DEVICE — SEE MEDLINE ITEM 146292

## (undated) DEVICE — SUT CTD VICRYL 0 UND BR SUT

## (undated) DEVICE — SEE MEDLINE ITEM 157181

## (undated) DEVICE — SEAL UNIVERSAL 5MM-8MM XI

## (undated) DEVICE — SUT CTD VICRYL PLUS 4/0

## (undated) DEVICE — OCCLUDER COLPO-PNEUMO STERILE

## (undated) DEVICE — SOL ELECTROLUBE ANTI-STIC

## (undated) DEVICE — TUBING HEATED INSUFFLATOR

## (undated) DEVICE — ADHESIVE DERMABOND ADVANCED

## (undated) DEVICE — TROCAR ENDOPATH XCEL 5X100MM

## (undated) DEVICE — GLOVE SURGICAL LATEX SZ 6.5

## (undated) DEVICE — SYR 10CC LUER LOCK

## (undated) DEVICE — KIT ANTIFOG

## (undated) DEVICE — SUT ABS CLIP LAPRA-TY CTD

## (undated) DEVICE — COVER OVERHEAD SURG LT BLUE

## (undated) DEVICE — SOL NS 1000CC

## (undated) DEVICE — DRAPE ARM DAVINCI XI

## (undated) DEVICE — SEE MEDLINE ITEM 146372

## (undated) DEVICE — ELECTRODE REM PLYHSV RETURN 9

## (undated) DEVICE — SEE MEDLINE ITEM 154981

## (undated) DEVICE — DRAPE STERI LONG

## (undated) DEVICE — SYR 50CC LL

## (undated) DEVICE — COVER TIP CURVED SCISSORS XI

## (undated) DEVICE — GLOVE SURGICAL LATEX SZ 7

## (undated) DEVICE — EVACUATOR KIT SMOKE PLUME AWAY

## (undated) DEVICE — SOL 9P NACL IRR PIC IL

## (undated) DEVICE — DRAPE LAVH LAPAROSCOPY W/FLUID

## (undated) DEVICE — DRAPE COLUMN DAVINCI XI

## (undated) DEVICE — POSITIONER HEAD DONUT 9IN FOAM

## (undated) DEVICE — IRRIGATOR ENDOSCOPY DISP.

## (undated) DEVICE — SEE MEDLINE ITEM 157027

## (undated) DEVICE — SUT CAPIO 0 TC L36 MONOFIL

## (undated) DEVICE — DEVICE CAPIO SLIM SUTURE CAPT

## (undated) DEVICE — SEE MEDLINE ITEM 152622

## (undated) DEVICE — Device

## (undated) DEVICE — NDL PNEUMO INSUFFLATI 120MM

## (undated) DEVICE — SYR 3CC LUER LOC